# Patient Record
Sex: FEMALE | Race: ASIAN | NOT HISPANIC OR LATINO | Employment: FULL TIME | URBAN - METROPOLITAN AREA
[De-identification: names, ages, dates, MRNs, and addresses within clinical notes are randomized per-mention and may not be internally consistent; named-entity substitution may affect disease eponyms.]

---

## 2018-03-31 ENCOUNTER — HOSPITAL ENCOUNTER (EMERGENCY)
Facility: HOSPITAL | Age: 48
Discharge: HOME | End: 2018-03-31
Payer: COMMERCIAL

## 2018-03-31 VITALS
TEMPERATURE: 98.1 F | HEART RATE: 89 BPM | DIASTOLIC BLOOD PRESSURE: 74 MMHG | RESPIRATION RATE: 20 BRPM | OXYGEN SATURATION: 96 % | SYSTOLIC BLOOD PRESSURE: 115 MMHG | BODY MASS INDEX: 20.49 KG/M2 | HEIGHT: 64 IN | WEIGHT: 120 LBS

## 2018-03-31 DIAGNOSIS — R60.0 FACIAL EDEMA: Primary | ICD-10-CM

## 2018-03-31 PROCEDURE — 96361 HYDRATE IV INFUSION ADD-ON: CPT

## 2018-03-31 PROCEDURE — 99283 EMERGENCY DEPT VISIT LOW MDM: CPT

## 2018-03-31 PROCEDURE — 96375 TX/PRO/DX INJ NEW DRUG ADDON: CPT

## 2018-03-31 PROCEDURE — 96374 THER/PROPH/DIAG INJ IV PUSH: CPT

## 2018-03-31 RX ORDER — DIPHENHYDRAMINE HYDROCHLORIDE 50 MG/ML
25 INJECTION INTRAMUSCULAR; INTRAVENOUS ONCE
Status: COMPLETED | OUTPATIENT
Start: 2018-03-31 | End: 2018-03-31

## 2018-03-31 RX ORDER — METHYLPREDNISOLONE SODIUM SUCCINATE 125 MG/2ML
125 INJECTION, POWDER, LYOPHILIZED, FOR SOLUTION INTRAMUSCULAR; INTRAVENOUS ONCE
Status: COMPLETED | OUTPATIENT
Start: 2018-03-31 | End: 2018-03-31

## 2018-03-31 RX ORDER — GLIMEPIRIDE 2 MG/1
2 TABLET ORAL
COMMUNITY

## 2018-03-31 RX ORDER — ATORVASTATIN CALCIUM 20 MG/1
20 TABLET, FILM COATED ORAL DAILY
COMMUNITY

## 2018-03-31 RX ORDER — TAMOXIFEN CITRATE 20 MG/1
20 TABLET ORAL 2 TIMES DAILY
COMMUNITY

## 2018-03-31 RX ORDER — PREDNISONE 10 MG/1
TABLET ORAL
Qty: 40 TABLET | Refills: 0 | Status: SHIPPED | OUTPATIENT
Start: 2018-03-31 | End: 2020-04-06 | Stop reason: HOSPADM

## 2018-03-31 RX ORDER — IRBESARTAN 75 MG/1
75 TABLET ORAL
COMMUNITY

## 2018-03-31 RX ADMIN — METHYLPREDNISOLONE SODIUM SUCCINATE 125 MG: 125 INJECTION, POWDER, FOR SOLUTION INTRAMUSCULAR; INTRAVENOUS at 07:44

## 2018-03-31 RX ADMIN — DIPHENHYDRAMINE HYDROCHLORIDE 25 MG: 50 INJECTION, SOLUTION INTRAMUSCULAR; INTRAVENOUS at 07:42

## 2018-03-31 RX ADMIN — FAMOTIDINE 20 MG: 10 INJECTION, SOLUTION INTRAVENOUS at 07:46

## 2018-03-31 RX ADMIN — SODIUM CHLORIDE 1000 ML: 0.9 INJECTION, SOLUTION INTRAVENOUS at 07:46

## 2018-03-31 NOTE — ED NOTES
Patient is eating cookies at this time  No evidence of distress noted  Patients face appears to be less swollen  Will continue to monitor        Dianna Jauregui RN  03/31/18 4378

## 2018-03-31 NOTE — DISCHARGE INSTRUCTIONS
Stop prescription losartan until you see your family doctor  General Allergic Reaction, Ambulatory Care   GENERAL INFORMATION:   A general allergic reaction  is your body's response to an allergen  Allergens include medicines, food, insect stings, animal dander, mold, latex, chemicals, and dust mites  Pollen from trees, grass, and weeds can also cause an allergic reaction  An allergic reaction can cause one or more symptoms  Seek immediate care for the following symptoms:   · A skin rash, hives, swelling, or itching that gets worse    · Trouble breathing, shortness of breath, wheezing, or coughing    · Tightened or swollen throat, or your lips or tongue swell    · Trouble swallowing or speaking    · Dizziness, lightheadedness, fainting, or confusion    · Nausea, vomiting, diarrhea, or abdominal cramps    · Chest pain or tightness  Treatment for a general allergic reaction  may include medicines to relieve certain allergy symptoms such as itching, sneezing, and swelling  You may take them as a pill or use drops in your nose or eyes  Topical treatments may be given to put directly on your skin to help decrease itching or swelling  Manage allergic reactions:   · Avoid the allergen  that you think may have caused your allergic reaction  · Use cold compresses  on your skin or eyes if they were affected by the allergic reaction  Cold compresses may help to soothe your skin or eyes  · Rinse your nasal passages  with a saline solution  Daily rinsing may help clear your nose of allergens  · Do not smoke  Your allergy symptoms may decrease if you are not around smoke  If you smoke, it is never too late to quit  Ask your healthcare provider for information about how to stop if you need help quitting  Follow up with your healthcare provider as directed:  Write down your questions so you remember to ask them during your visits  CARE AGREEMENT:   You have the right to help plan your care   Learn about your health condition and how it may be treated  Discuss treatment options with your caregivers to decide what care you want to receive  You always have the right to refuse treatment  The above information is an  only  It is not intended as medical advice for individual conditions or treatments  Talk to your doctor, nurse or pharmacist before following any medical regimen to see if it is safe and effective for you  © 2014 [address] is for End User's use only and may not be sold, redistributed or otherwise used for commercial purposes  All illustrations and images included in CareNotes® are the copyrighted property of [employer] , [employer]  or [employer]  Angioedema   WHAT YOU NEED TO KNOW:   Angioedema is sudden swelling caused by fluid that collects in deep layers of the skin  Swelling occurs most often on the face, lips, tongue, or throat, but it can happen anywhere on the body  Your risk for angioedema increases if you have food or insect allergies or a family history of angioedema  Emotional stress, autoimmune disorders, and medicines, such as ACE inhibitors, also increase your risk  Your symptoms may be mild, or you may develop anaphylaxis  Anaphylaxis is a sudden, life-threatening reaction that needs immediate treatment  DISCHARGE INSTRUCTIONS:   Call 911 for signs or symptoms of anaphylaxis,  such as trouble breathing, swelling in your mouth or throat, or wheezing  You may also have itching, a rash, hives, or feel like you are going to faint  Return to the emergency department if:   · You have sudden behavior changes or irritability  · You are dizzy and your heart is beating faster than usual   Contact your healthcare provider if:   · Your swelling does not improve, even after you take your medicines  · You have questions or concerns about your condition or care  Medicines:   · Antihistamines  decrease mild symptoms such as itching or a rash  · Epinephrine  is used to treat severe allergic reactions such as anaphylaxis  · Steroids: This medicine may be given to decrease redness, pain, and swelling  · Take your medicine as directed  Contact your healthcare provider if you think your medicine is not helping or if you have side effects  Tell him of her if you are allergic to any medicine  Keep a list of the medicines, vitamins, and herbs you take  Include the amounts, and when and why you take them  Bring the list or the pill bottles to follow-up visits  Carry your medicine list with you in case of an emergency  Steps to take for signs or symptoms of anaphylaxis:   · Immediately  give 1 shot of epinephrine only into the outer thigh muscle  · Leave the shot in place  as directed  Your healthcare provider may recommend you leave it in place for up to 10 seconds before you remove it  This helps make sure all of the epinephrine is delivered  · Call 911 and go to the emergency department,  even if the shot improved symptoms  Do not drive yourself  Bring the used epinephrine shot with you  Safety precautions to take if you are at risk for anaphylaxis:   · Keep 2 shots of epinephrine with you at all times  You may need a second shot, because epinephrine only works for about 20 minutes and symptoms may return  Your healthcare provider can show you and family members how to give the shot  Check the expiration date every month and replace it before it expires  · Create an action plan  Your healthcare provider can help you create a written plan that explains the allergy and an emergency plan to treat a reaction  The plan explains when to give a second epinephrine shot if symptoms return or do not improve after the first  Give copies of the action plan and emergency instructions to family members, work and school staff, and  providers  Show them how to give a shot of epinephrine  · Be careful when you exercise    If you have had exercise-induced anaphylaxis, do not exercise right after you eat  Stop exercising right away if you start to develop any signs or symptoms of anaphylaxis  You may first feel tired, warm, or have itchy skin  Hives, swelling, and severe breathing problems may develop if you continue to exercise  · Carry medical alert identification  Wear medical alert jewelry or carry a card that explains the allergy  Ask your healthcare provider where to get these items  · Keep a symptom diary  Include information about how often your symptoms occur, how long they last, and if they are mild or severe  Also keep information on what you ate, what happened, or which medicines you took before the swelling started  · Avoid triggers  Triggers include foods, medicines, and other things that you know cause symptoms  You may need to see a specialist, such as an allergist or dietitian, to learn what to avoid  Follow up with your healthcare provider as directed:  Write down your questions so you remember to ask them during your visits  © 2017 [address] Information is for End User's use only and may not be sold, redistributed or otherwise used for commercial purposes  All illustrations and images included in CareNotes® are the copyrighted property of [employer]  or [employer]  The above information is an  only  It is not intended as medical advice for individual conditions or treatments  Talk to your doctor, nurse or pharmacist before following any medical regimen to see if it is safe and effective for you

## 2018-03-31 NOTE — ED NOTES
IV access attempted at this time  Attempt unsuccessful in the right AC  Unable to use left arm due to  Increased risk of lymphedema as per patient        Natasha Cockayne, RN  03/31/18 6481

## 2018-03-31 NOTE — ED PROVIDER NOTES
History  Chief Complaint   Patient presents with    Allergic Reaction     Pt presents to ED with lip and facial swelling starting yesterday  Pt states she took benadryl without relief and woke up with significantly worse swelling  Now also c/o some tightness in throat  Denies SOB      Patient is a 50-year-old female who reports itchy face with facial and upper lip swelling yesterday shortly after eating lunch of salmon w asparagus  She took 2 Benadryl and went to bed, but symptoms seem worse upon awakening today w upper lip and facial swelling increased, now has a sensation in her throat as well as an itchy chest   She denies fever/chills, itchy throat, swollen tongue, chest tightness, chest pain, shortness of breath, wheezing, abdominal pain, nausea, vomiting  She did not take any of her maintenance medications as of yet today  Pt denies recent changes or new products including lotions, detergents, makeup  Further denies new meds including otc, rx or supplements, foods  Prior to Admission Medications   Prescriptions Last Dose Informant Patient Reported? Taking?    Dapagliflozin Propanediol (FARXIGA) 10 MG TABS   Yes Yes   Sig: Take by mouth daily   atorvastatin (LIPITOR) 20 mg tablet   Yes Yes   Sig: Take 20 mg by mouth daily   glimepiride (AMARYL) 2 mg tablet   Yes Yes   Sig: Take 2 mg by mouth every morning before breakfast   irbesartan (AVAPRO) 75 mg tablet   Yes Yes   Sig: Take 75 mg by mouth daily at bedtime   metFORMIN (GLUCOPHAGE) 1000 MG tablet   Yes Yes   Sig: Take 1,000 mg by mouth 2 (two) times a day with meals   tamoxifen (NOLVADEX) 20 mg tablet   Yes Yes   Sig: Take 20 mg by mouth 2 (two) times a day      Facility-Administered Medications: None       Past Medical History:   Diagnosis Date    [facility] )     breast    [facility]     Hypertension        Past Surgical History:   Procedure Laterality Date    DILATION AND CURETTAGE OF UTERUS      LYMPHADENECTOMY History reviewed  No pertinent family history  I have reviewed and agree with the history as documented  Social History   Substance Use Topics    Smoking status: Never Smoker    Smokeless tobacco: Never Used    Alcohol use Yes      Comment: socially        Review of Systems   Constitutional: Negative for chills and fever  HENT: Positive for facial swelling  Negative for congestion, rhinorrhea, sinus pain and sore throat  Respiratory: Negative for cough, shortness of breath and wheezing  Cardiovascular: Negative for chest pain  Gastrointestinal: Negative for abdominal pain, diarrhea, nausea and vomiting  Musculoskeletal: Negative for arthralgias and myalgias  Skin: Positive for rash (itchy chest)  Neurological: Negative for dizziness and headaches  All other systems reviewed and are negative  Physical Exam  ED Triage Vitals [03/31/18 0649]   Temperature Pulse Respirations Blood Pressure SpO2   98 1 °F (36 7 °C) 92 17 136/81 97 %      Temp Source Heart Rate Source Patient Position - Orthostatic VS BP Location FiO2 (%)   Oral Monitor Sitting Right arm --      Pain Score       No Pain           Orthostatic Vital Signs  Vitals:    03/31/18 0748 03/31/18 0900 03/31/18 0956 03/31/18 1104   BP: 130/86 129/76 116/72 115/74   Pulse: 87 84 87 89   Patient Position - Orthostatic VS: Lying Lying         Physical Exam   Constitutional: She is oriented to person, place, and time  She appears well-developed and well-nourished  No distress  HENT:   Head: Normocephalic and atraumatic  Right Ear: Hearing, tympanic membrane, external ear and ear canal normal    Left Ear: Hearing, tympanic membrane, external ear and ear canal normal    Nose: Nose normal  No mucosal edema or rhinorrhea  Right sinus exhibits no maxillary sinus tenderness  Left sinus exhibits no maxillary sinus tenderness     Mouth/Throat: Uvula is midline, oropharynx is clear and moist and mucous membranes are normal  No trismus in the jaw  No uvula swelling  No oropharyngeal exudate, posterior oropharyngeal edema or posterior oropharyngeal erythema  Tongue w no edema, pos pharynx and airway patent   Eyes: Conjunctivae, EOM and lids are normal  Pupils are equal, round, and reactive to light  Neck: Normal range of motion  Neck supple  Cardiovascular: Normal rate, regular rhythm and normal heart sounds  Pulmonary/Chest: Effort normal and breath sounds normal    Abdominal: Soft  Normal appearance and bowel sounds are normal  There is no tenderness  Musculoskeletal:   Normal gait and station  Non-focal with FROM upper, lower extremities, neck, chest and back   Lymphadenopathy:     She has no cervical adenopathy  Neurological: She is alert and oriented to person, place, and time  Appropriate speech and behavior  Non-focal  No sensory deficits noted, no motor deficits noted   Skin: Skin is warm and dry  Capillary refill takes less than 2 seconds  ED Medications  Medications   diphenhydrAMINE (BENADRYL) injection 25 mg (25 mg Intravenous Given 3/31/18 0742)   methylPREDNISolone sodium succinate (Solu-MEDROL) injection 125 mg (125 mg Intravenous Given 3/31/18 0744)   sodium chloride 0 9 % bolus 1,000 mL (0 mL Intravenous Stopped 3/31/18 1143)   diphenhydrAMINE (BENADRYL) injection 25 mg (25 mg Intravenous Given 3/31/18 0742)   famotidine (PEPCID) injection 20 mg (20 mg Intravenous Given 3/31/18 0746)       Diagnostic Studies  Results Reviewed     None                 No orders to display              Procedures  Procedures       Phone Contacts  ED Phone Contact    ED Course  ED Course                                MDM  Number of Diagnoses or Management Options  Facial edema:   Diagnosis management comments: 78-year-old female presents with itchy face and chest, upper lip and bilateral cheek swelling starting yesterday shortly after eating lunch  Patient has a history of diabetes, hypertension and breast cancer    She does take losartan for her blood pressure  Differential includes allergic reaction versus angioedema from ARB  Patient's vital signs are stable, airway is patent & lungs are clear  Case and treatment plan discussed with attending  Plan is to administer fluids, Benadryl, Solu-Medrol and Pepcid IV then reassess  7786 upon reassessment patient states that the itching has resolved, facial and lip swelling feels the same  She still feels a slight sensation in her throat, however it has improved from earlier  1030 upon reassessment patient's symptoms have continued to improve  She reports feeling less swollen in her cheeks and chin, and denies throat sensation  She has continued to remain both clinically and hemodynamically stable while in the ED  She is stable for discharge  We discussed symptoms secondary to allergic reaction versus angioedema from prescription medication most likely Arb, but any medication can cause allergic reaction  Plan is to prescribe prednisone taper for 2 weeks and have patient stop prescription losartan until she follows up with her primary care physician  She can continue Benadryl as directed as needed  Both verbal and written discharge instructions provided  Adequate time was allowed to answer any questions  Recommend follow up with family doctor or referral physician as discussed, sooner if symptoms persist, change or worsen  Red flag symptoms as well as reasons to return to the ED discussed  Pt verbally understood treatment plan and was discharged home in stable condition        CritCare Time    Disposition  Final diagnoses:   Facial edema - allergic reaction vs angioedema     Time reflects when diagnosis was documented in both MDM as applicable and the Disposition within this note     Time User Action Codes Description Comment    3/31/2018 11:26 AM Hetal IRVING Add [R60 0] Facial edema     3/31/2018 11:26 AM Hetal IRVING Modify [R60 0] Facial edema allergic reaction vs angioedema      ED Disposition     ED Disposition Condition Comment    Discharge  [address] discharge to home/self care  Condition at discharge: Good        Follow-up Information     Follow up With Specialties Details Why Michelle Smith MD  Schedule an appointment as soon as possible for a visit Recommend follow up with PCP ASAP due to allergic reaction vs angioedema reaction to prescription medication [address] [place] [address]  [phone]          Patient's Medications   Discharge Prescriptions    PREDNISONE 10 MG TABLET    4 tabs days 1-4, 3 tabs days 5-8, 2 tabs days 9-12, 1 tab days 13-16  Take full dose in am with breakfast        Start Date: 3/31/2018 End Date: --       Order Dose: --       Quantity: 40 tablet    Refills: 0     No discharge procedures on file      ED Provider  Electronically Signed by           Bakari Vance PA-C  03/31/18 1149

## 2019-12-13 ENCOUNTER — HOSPITAL ENCOUNTER (EMERGENCY)
Facility: HOSPITAL | Age: 49
Discharge: HOME | End: 2019-12-13
Attending: EMERGENCY MEDICINE
Payer: COMMERCIAL

## 2019-12-13 ENCOUNTER — APPOINTMENT (EMERGENCY)
Dept: CT IMAGING | Facility: HOSPITAL | Age: 49
End: 2019-12-13
Payer: COMMERCIAL

## 2019-12-13 VITALS
WEIGHT: 115 LBS | HEART RATE: 97 BPM | DIASTOLIC BLOOD PRESSURE: 71 MMHG | TEMPERATURE: 97.6 F | SYSTOLIC BLOOD PRESSURE: 120 MMHG | OXYGEN SATURATION: 97 % | RESPIRATION RATE: 16 BRPM | BODY MASS INDEX: 20.05 KG/M2

## 2019-12-13 DIAGNOSIS — M54.50 ACUTE MIDLINE LOW BACK PAIN WITHOUT SCIATICA: Primary | ICD-10-CM

## 2019-12-13 LAB
ALBUMIN SERPL BCP-MCNC: 3.9 G/DL (ref 3.5–5)
ALP SERPL-CCNC: 58 U/L (ref 46–116)
ALT SERPL W P-5'-P-CCNC: 23 U/L (ref 12–78)
ANION GAP SERPL CALCULATED.3IONS-SCNC: 10 MMOL/L (ref 4–13)
APTT PPP: 27 SECONDS (ref 23–37)
AST SERPL W P-5'-P-CCNC: 11 U/L (ref 5–45)
BACTERIA UR QL AUTO: ABNORMAL /HPF
BASOPHILS # BLD AUTO: 0.05 THOUSANDS/ΜL (ref 0–0.1)
BASOPHILS NFR BLD AUTO: 1 % (ref 0–1)
BILIRUB DIRECT SERPL-MCNC: 0.12 MG/DL (ref 0–0.2)
BILIRUB SERPL-MCNC: 0.5 MG/DL (ref 0.2–1)
BILIRUB UR QL STRIP: NEGATIVE
BUN SERPL-MCNC: 9 MG/DL (ref 5–25)
CALCIUM SERPL-MCNC: 9.4 MG/DL (ref 8.3–10.1)
CHLORIDE SERPL-SCNC: 103 MMOL/L (ref 100–108)
CLARITY UR: ABNORMAL
CO2 SERPL-SCNC: 27 MMOL/L (ref 21–32)
COLOR UR: YELLOW
CREAT SERPL-MCNC: 0.51 MG/DL (ref 0.6–1.3)
EOSINOPHIL # BLD AUTO: 0.06 THOUSAND/ΜL (ref 0–0.61)
EOSINOPHIL NFR BLD AUTO: 1 % (ref 0–6)
ERYTHROCYTE [DISTWIDTH] IN BLOOD BY AUTOMATED COUNT: 13.2 % (ref 11.6–15.1)
EXT PREG TEST URINE: NEGATIVE
EXT. CONTROL ED NAV: NORMAL
GFR SERPL CREATININE-BSD FRML MDRD: 114 ML/MIN/1.73SQ M
GLUCOSE SERPL-MCNC: 139 MG/DL (ref 65–140)
GLUCOSE UR STRIP-MCNC: NEGATIVE MG/DL
HCT VFR BLD AUTO: 37.5 % (ref 34.8–46.1)
HGB BLD-MCNC: 11.5 G/DL (ref 11.5–15.4)
HGB UR QL STRIP.AUTO: NEGATIVE
IMM GRANULOCYTES # BLD AUTO: 0.01 THOUSAND/UL (ref 0–0.2)
IMM GRANULOCYTES NFR BLD AUTO: 0 % (ref 0–2)
INR PPP: 0.93 (ref 0.84–1.19)
KETONES UR STRIP-MCNC: NEGATIVE MG/DL
LEUKOCYTE ESTERASE UR QL STRIP: NEGATIVE
LYMPHOCYTES # BLD AUTO: 1.6 THOUSANDS/ΜL (ref 0.6–4.47)
LYMPHOCYTES NFR BLD AUTO: 30 % (ref 14–44)
MCH RBC QN AUTO: 24.9 PG (ref 26.8–34.3)
MCHC RBC AUTO-ENTMCNC: 30.7 G/DL (ref 31.4–37.4)
MCV RBC AUTO: 81 FL (ref 82–98)
MONOCYTES # BLD AUTO: 0.47 THOUSAND/ΜL (ref 0.17–1.22)
MONOCYTES NFR BLD AUTO: 9 % (ref 4–12)
NEUTROPHILS # BLD AUTO: 3.08 THOUSANDS/ΜL (ref 1.85–7.62)
NEUTS SEG NFR BLD AUTO: 59 % (ref 43–75)
NITRITE UR QL STRIP: POSITIVE
NON-SQ EPI CELLS URNS QL MICRO: ABNORMAL /HPF
NRBC BLD AUTO-RTO: 0 /100 WBCS
PH UR STRIP.AUTO: 5.5 [PH]
PLATELET # BLD AUTO: 401 THOUSANDS/UL (ref 149–390)
PMV BLD AUTO: 9.4 FL (ref 8.9–12.7)
POTASSIUM SERPL-SCNC: 4.4 MMOL/L (ref 3.5–5.3)
PROT SERPL-MCNC: 7.5 G/DL (ref 6.4–8.2)
PROT UR STRIP-MCNC: NEGATIVE MG/DL
PROTHROMBIN TIME: 12.5 SECONDS (ref 11.6–14.5)
RBC # BLD AUTO: 4.61 MILLION/UL (ref 3.81–5.12)
RBC #/AREA URNS AUTO: ABNORMAL /HPF
SODIUM SERPL-SCNC: 140 MMOL/L (ref 136–145)
SP GR UR STRIP.AUTO: 1.02 (ref 1–1.03)
UROBILINOGEN UR QL STRIP.AUTO: 0.2 E.U./DL
WBC # BLD AUTO: 5.27 THOUSAND/UL (ref 4.31–10.16)
WBC #/AREA URNS AUTO: ABNORMAL /HPF

## 2019-12-13 PROCEDURE — 81001 URINALYSIS AUTO W/SCOPE: CPT | Performed by: EMERGENCY MEDICINE

## 2019-12-13 PROCEDURE — 80076 HEPATIC FUNCTION PANEL: CPT | Performed by: EMERGENCY MEDICINE

## 2019-12-13 PROCEDURE — 85730 THROMBOPLASTIN TIME PARTIAL: CPT | Performed by: EMERGENCY MEDICINE

## 2019-12-13 PROCEDURE — 85610 PROTHROMBIN TIME: CPT | Performed by: EMERGENCY MEDICINE

## 2019-12-13 PROCEDURE — 81025 URINE PREGNANCY TEST: CPT | Performed by: EMERGENCY MEDICINE

## 2019-12-13 PROCEDURE — 80048 BASIC METABOLIC PNL TOTAL CA: CPT | Performed by: EMERGENCY MEDICINE

## 2019-12-13 PROCEDURE — 72131 CT LUMBAR SPINE W/O DYE: CPT

## 2019-12-13 PROCEDURE — 99285 EMERGENCY DEPT VISIT HI MDM: CPT | Performed by: EMERGENCY MEDICINE

## 2019-12-13 PROCEDURE — 99284 EMERGENCY DEPT VISIT MOD MDM: CPT

## 2019-12-13 PROCEDURE — 36415 COLL VENOUS BLD VENIPUNCTURE: CPT | Performed by: EMERGENCY MEDICINE

## 2019-12-13 PROCEDURE — 96374 THER/PROPH/DIAG INJ IV PUSH: CPT

## 2019-12-13 PROCEDURE — 85025 COMPLETE CBC W/AUTO DIFF WBC: CPT | Performed by: EMERGENCY MEDICINE

## 2019-12-13 RX ORDER — PREDNISONE 20 MG/1
40 TABLET ORAL DAILY
Qty: 10 TABLET | Refills: 0 | Status: SHIPPED | OUTPATIENT
Start: 2019-12-13 | End: 2020-04-06 | Stop reason: HOSPADM

## 2019-12-13 RX ORDER — MORPHINE SULFATE 10 MG/ML
6 INJECTION, SOLUTION INTRAMUSCULAR; INTRAVENOUS ONCE
Status: COMPLETED | OUTPATIENT
Start: 2019-12-13 | End: 2019-12-13

## 2019-12-13 RX ORDER — OXYCODONE HYDROCHLORIDE AND ACETAMINOPHEN 5; 325 MG/1; MG/1
1 TABLET ORAL EVERY 4 HOURS PRN
Qty: 10 TABLET | Refills: 0 | Status: SHIPPED | OUTPATIENT
Start: 2019-12-13

## 2019-12-13 RX ADMIN — MORPHINE SULFATE 6 MG: 10 INJECTION INTRAVENOUS at 12:47

## 2019-12-13 NOTE — ED PROVIDER NOTES
History  Chief Complaint   Patient presents with    Back Pain     pt co of mid lower abd pain onset this am after bending over for shoes      Sudden onset moderate to severe aching mid back pain, radiates around to abdomen bilaterally, started just prior to arrival while bending over to tie her shoes  Worse with bending and moving, alleviated w rest and sitting still  No h/o similar sxs  No direct trauma  No constitutional sxs  No leg weakness or numbness  No syncope or presyncope  No h/o AAA or known aneurysm  No chest or upper back pain  No incontinence  Prior to Admission Medications   Prescriptions Last Dose Informant Patient Reported? Taking? Dapagliflozin Propanediol (FARXIGA) 10 MG TABS   Yes No   Sig: Take by mouth daily   atorvastatin (LIPITOR) 20 mg tablet   Yes No   Sig: Take 20 mg by mouth daily   glimepiride (AMARYL) 2 mg tablet   Yes No   Sig: Take 2 mg by mouth every morning before breakfast   irbesartan (AVAPRO) 75 mg tablet   Yes No   Sig: Take 75 mg by mouth daily at bedtime   metFORMIN (GLUCOPHAGE) 1000 MG tablet   Yes No   Sig: Take 1,000 mg by mouth 2 (two) times a day with meals   predniSONE 10 mg tablet   No No   Si tabs days 1-4, 3 tabs days 5-8, 2 tabs days 9-12, 1 tab days 13-16  Take full dose in am with breakfast    tamoxifen (NOLVADEX) 20 mg tablet   Yes No   Sig: Take 20 mg by mouth 2 (two) times a day      Facility-Administered Medications: None       Past Medical History:   Diagnosis Date    Cancer ([facility] )     breast    [facility]     Hypertension        Past Surgical History:   Procedure Laterality Date    DILATION AND CURETTAGE OF UTERUS      LYMPHADENECTOMY         History reviewed  No pertinent family history  I have reviewed and agree with the history as documented      Social History     Tobacco Use    Smoking status: Never Smoker    Smokeless tobacco: Never Used   Substance Use Topics    Alcohol use: Yes     Comment: socially    Drug use: No        Review of Systems   Gastrointestinal: Positive for abdominal pain  Musculoskeletal: Positive for back pain  All other systems reviewed and are negative  Physical Exam  Physical Exam   Constitutional: She is oriented to person, place, and time  She appears well-developed and well-nourished  No distress  HENT:   Head: Normocephalic and atraumatic  Eyes: Pupils are equal, round, and reactive to light  Conjunctivae and EOM are normal    Neck: Normal range of motion  Neck supple  No JVD present  Cardiovascular: Normal rate, regular rhythm, normal heart sounds and intact distal pulses  Pulmonary/Chest: Effort normal and breath sounds normal  No stridor  Abdominal: Soft  She exhibits no distension  There is no tenderness  There is no rebound and no guarding  Musculoskeletal: Normal range of motion  She exhibits no edema, tenderness or deformity  Neurological: She is alert and oriented to person, place, and time  No cranial nerve deficit or sensory deficit  She exhibits normal muscle tone  Coordination normal    Skin: Skin is warm and dry  Capillary refill takes less than 2 seconds  No rash noted  She is not diaphoretic  No erythema  No pallor  Nursing note and vitals reviewed        Vital Signs  ED Triage Vitals [12/13/19 0959]   Temperature Pulse Respirations Blood Pressure SpO2   97 6 °F (36 4 °C) 102 16 117/69 99 %      Temp Source Heart Rate Source Patient Position - Orthostatic VS BP Location FiO2 (%)   Oral Monitor Sitting Right arm --      Pain Score       Worst Possible Pain           Vitals:    12/13/19 0959 12/13/19 1210 12/13/19 1300 12/13/19 1330   BP: 117/69 124/75 119/70 120/71   Pulse: 102 95 97 97   Patient Position - Orthostatic VS: Sitting Sitting Sitting          Visual Acuity      ED Medications  Medications   morphine (PF) 10 mg/mL injection 6 mg (6 mg Intravenous Given 12/13/19 1247)       Diagnostic Studies  Results Reviewed     Procedure Component Value Units Date/Time    Urine Microscopic [[id number]]  (Abnormal) Collected:  12/13/19 1157    Lab Status:  Final result Specimen:  Urine, Clean Catch Updated:  12/13/19 1226     RBC, UA None Seen /hpf      WBC, UA 2-4 /hpf      Epithelial Cells Occasional /hpf      Bacteria, UA Innumerable /hpf     UA (URINE) with reflex to Scope [[id number]]  (Abnormal) Collected:  12/13/19 1157    Lab Status:  Final result Specimen:  Urine, Clean Catch Updated:  12/13/19 1209     Color, UA Yellow     Clarity, UA Slightly Cloudy     Specific Jay, UA 1 020     pH, UA 5 5     Leukocytes, UA Negative     Nitrite, UA Positive     Protein, UA Negative mg/dl      Glucose, UA Negative mg/dl      Ketones, UA Negative mg/dl      Urobilinogen, UA 0 2 E U /dl      Bilirubin, UA Negative     Blood, UA Negative    POCT pregnancy, urine [[id number]]  (Normal) Resulted:  12/13/19 1202    Lab Status:  Final result Updated:  12/13/19 1203     EXT PREG TEST UR (Ref: Negative) negative     Control valid    Hepatic function panel [[id number]]  (Normal) Collected:  12/13/19 1116    Lab Status:  Final result Specimen:  Blood from Arm, Right Updated:  12/13/19 1146     Total Bilirubin 0 50 mg/dL      Bilirubin, Direct 0 12 mg/dL      Alkaline Phosphatase 58 U/L      AST 11 U/L      ALT 23 U/L      Total Protein 7 5 g/dL      Albumin 3 9 g/dL     Basic metabolic panel [[id number]]  (Abnormal) Collected:  12/13/19 1116    Lab Status:  Final result Specimen:  Blood from Arm, Right Updated:  12/13/19 1146     Sodium 140 mmol/L      Potassium 4 4 mmol/L      Chloride 103 mmol/L      CO2 27 mmol/L      ANION GAP 10 mmol/L      BUN 9 mg/dL      Creatinine 0 51 mg/dL      Glucose 139 mg/dL      Calcium 9 4 mg/dL      eGFR 114 ml/min/1 73sq m     Narrative:       [place] guidelines for Chronic Kidney Disease (CKD):     Stage 1 with normal or high GFR (GFR > 90 mL/min/1 73 square meters)    Stage 2 Mild CKD (GFR = 60-89 mL/min/1 73 square meters)   Stage 3A Moderate CKD (GFR = 45-59 mL/min/1 73 square meters)    Stage 3B Moderate CKD (GFR = 30-44 mL/min/1 73 square meters)    Stage 4 Severe CKD (GFR = 15-29 mL/min/1 73 square meters)    Stage 5 End Stage CKD (GFR <15 mL/min/1 73 square meters)  Note: GFR calculation is accurate only with a steady state creatinine    Protime-INR [[id number]]  (Normal) Collected:  12/13/19 1116    Lab Status:  Final result Specimen:  Blood from Arm, Right Updated:  12/13/19 1141     Protime 12 5 seconds      INR 0 93    APTT [[id number]]  (Normal) Collected:  12/13/19 1116    Lab Status:  Final result Specimen:  Blood from Arm, Right Updated:  12/13/19 1141     PTT 27 seconds     CBC and differential [[id number]]  (Abnormal) Collected:  12/13/19 1116    Lab Status:  Final result Specimen:  Blood from Arm, Right Updated:  12/13/19 1137     WBC 5 27 Thousand/uL      RBC 4 61 Million/uL      Hemoglobin 11 5 g/dL      Hematocrit 37 5 %      MCV 81 fL      MCH 24 9 pg      MCHC 30 7 g/dL      RDW 13 2 %      MPV 9 4 fL      Platelets 830 Thousands/uL      nRBC 0 /100 WBCs      Neutrophils Relative 59 %      Immat GRANS % 0 %      Lymphocytes Relative 30 %      Monocytes Relative 9 %      Eosinophils Relative 1 %      Basophils Relative 1 %      Neutrophils Absolute 3 08 Thousands/µL      Immature Grans Absolute 0 01 Thousand/uL      Lymphocytes Absolute 1 60 Thousands/µL      Monocytes Absolute 0 47 Thousand/µL      Eosinophils Absolute 0 06 Thousand/µL      Basophils Absolute 0 05 Thousands/µL                  CT spine lumbar without contrast   Final Result by Chato Barrett MD (12/13 1310)      No discrete lytic or blastic osseous lesions identified  Transitional type anatomy at the lumbosacral junction, as described above for which complete spine radiograph are recommended prior to any consideration for intervention  Mild degenerative changes as described above                 Workstation performed: [id number] Procedures  Procedures         ED Course                               MDM      Disposition  Final diagnoses:   Acute midline low back pain without sciatica     Time reflects when diagnosis was documented in both MDM as applicable and the Disposition within this note     Time User Action Codes Description Comment    12/13/2019  1:40 PM Farnaz Stacy Add [M54 5] Acute midline low back pain without sciatica       ED Disposition     ED Disposition Condition Date/Time Comment    Discharge Stable Fri Dec 13, 2019  1:40 PM [address] [place] [address] discharge to home/self care  Follow-up Information     Follow up With Specialties Details Why Contact Info Additional Information    [facility] Physical Therapy In 3 days  [phone]          Discharge Medication List as of 12/13/2019  1:41 PM      START taking these medications    Details   oxyCODONE-acetaminophen (PERCOCET) 5-325 mg per tablet Take 1 tablet by mouth every 4 (four) hours as needed for moderate pain for up to 10 dosesMax Daily Amount: 6 tablets, Starting Fri 12/13/2019, Print      !! predniSONE 20 mg tablet Take 2 tablets (40 mg total) by mouth daily, Starting Fri 12/13/2019, Print       !! - Potential duplicate medications found  Please discuss with provider  CONTINUE these medications which have NOT CHANGED    Details   atorvastatin (LIPITOR) 20 mg tablet Take 20 mg by mouth daily, Historical Med      Dapagliflozin Propanediol (FARXIGA) 10 MG TABS Take by mouth daily, Historical Med      glimepiride (AMARYL) 2 mg tablet Take 2 mg by mouth every morning before breakfast, Historical Med      irbesartan (AVAPRO) 75 mg tablet Take 75 mg by mouth daily at bedtime, Historical Med      metFORMIN (GLUCOPHAGE) 1000 MG tablet Take 1,000 mg by mouth 2 (two) times a day with meals, Historical Med      !! predniSONE 10 mg tablet 4 tabs days 1-4, 3 tabs days 5-8, 2 tabs days 9-12, 1 tab days 13-16   Take full dose in am with breakfast , Print      tamoxifen (NOLVADEX) 20 mg tablet Take 20 mg by mouth 2 (two) times a day, Historical Med       !! - Potential duplicate medications found  Please discuss with provider  No discharge procedures on file      ED Provider  Electronically Signed by           Kim Gibbs MD  12/14/19 2869

## 2019-12-18 ENCOUNTER — NURSE TRIAGE (OUTPATIENT)
Dept: PHYSICAL THERAPY | Facility: OTHER | Age: 49
End: 2019-12-18

## 2019-12-18 DIAGNOSIS — M54.50 ACUTE MIDLINE LOW BACK PAIN WITHOUT SCIATICA: Primary | ICD-10-CM

## 2019-12-18 NOTE — TELEPHONE ENCOUNTER
Background - Initial Assessment  Clinical complaint: Acute low back pain, midline, no sciatica  Pt staed thjat she bent over to pick something up and started with this pain  Pt stated that the pain does go around to her stomach area  She stated that she "feels bloated"  Pt is ambulating with a cane, and today was the first time that she went down a flight of stairs  No neck/back surgeries, and is not currently following any specialist for her back  Pt did take the medication that were given to her at [facility]  And is using heat/message for her back  Pt also stated that she cannot stand more than 20 minutes or the pain becomes too horrible  Date of onset: 12/13/2019  Frequency of pain: constant  Quality of pain: throbbing, with certain movements will having a "stabbing"  Protocols used: SL AMB COMPREHENSIVE SPINE PROGRAM PROTOCOL    This nurse did review in detail the comp spine program and what we can provide for the pt for their back pain  Pt is agreeable to being triaged by this nurse and would like to have physical therapy  Referrals were placed to the following:  Physical Therapy at the [facility]     site  Pt was given the ph # to that office  No further questions voiced at this time and Pt did state understanding of the referral that was placed

## 2020-01-07 ENCOUNTER — TELEPHONE (OUTPATIENT)
Dept: PHYSICAL THERAPY | Facility: CLINIC | Age: 50
End: 2020-01-07

## 2020-01-07 NOTE — TELEPHONE ENCOUNTER
----- Message from Idalmis Verma RN sent at 12/18/2019 10:41 AM EST -----  Regarding: Comp Spine Pt  Alyssa Dawn Acute low back  Pt is expecting a call from your office to set up her appointment  She is Acute low back      Thanks,  Sneha Taylor RN [address] [place] [address]

## 2020-04-01 ENCOUNTER — HOSPITAL ENCOUNTER (INPATIENT)
Facility: HOSPITAL | Age: 50
LOS: 5 days | Discharge: HOME | DRG: 178 | End: 2020-04-06
Attending: EMERGENCY MEDICINE | Admitting: FAMILY MEDICINE
Payer: COMMERCIAL

## 2020-04-01 ENCOUNTER — APPOINTMENT (EMERGENCY)
Dept: RADIOLOGY | Facility: HOSPITAL | Age: 50
DRG: 178 | End: 2020-04-01
Payer: COMMERCIAL

## 2020-04-01 DIAGNOSIS — J12.82 PNEUMONIA DUE TO COVID-19 VIRUS: Primary | ICD-10-CM

## 2020-04-01 DIAGNOSIS — U07.1 COVID-19 VIRUS DETECTED: ICD-10-CM

## 2020-04-01 DIAGNOSIS — U07.1 PNEUMONIA DUE TO COVID-19 VIRUS: Primary | ICD-10-CM

## 2020-04-01 PROBLEM — E11.9 TYPE 2 DIABETES MELLITUS, WITH LONG-TERM CURRENT USE OF INSULIN (HCC): Status: ACTIVE | Noted: 2020-04-01

## 2020-04-01 PROBLEM — J98.8 VIRAL RESPIRATORY INFECTION: Status: ACTIVE | Noted: 2020-04-01

## 2020-04-01 PROBLEM — Z79.4 TYPE 2 DIABETES MELLITUS, WITH LONG-TERM CURRENT USE OF INSULIN (HCC): Status: ACTIVE | Noted: 2020-04-01

## 2020-04-01 PROBLEM — B97.89 VIRAL RESPIRATORY INFECTION: Status: ACTIVE | Noted: 2020-04-01

## 2020-04-01 PROBLEM — R65.10 SIRS (SYSTEMIC INFLAMMATORY RESPONSE SYNDROME) (HCC): Status: ACTIVE | Noted: 2020-04-01

## 2020-04-01 PROBLEM — I10 HYPERTENSION: Status: ACTIVE | Noted: 2020-04-01

## 2020-04-01 PROBLEM — E78.5 HYPERLIPIDEMIA: Status: ACTIVE | Noted: 2020-04-01

## 2020-04-01 LAB
ALBUMIN SERPL BCP-MCNC: 3.3 G/DL (ref 3.5–5)
ALP SERPL-CCNC: 65 U/L (ref 46–116)
ALT SERPL W P-5'-P-CCNC: 25 U/L (ref 12–78)
ANION GAP SERPL CALCULATED.3IONS-SCNC: 10 MMOL/L (ref 4–13)
APTT PPP: 39 SECONDS (ref 23–37)
AST SERPL W P-5'-P-CCNC: 22 U/L (ref 5–45)
ATRIAL RATE: 109 BPM
BASOPHILS # BLD AUTO: 0.01 THOUSANDS/ΜL (ref 0–0.1)
BASOPHILS NFR BLD AUTO: 0 % (ref 0–1)
BILIRUB SERPL-MCNC: 0.4 MG/DL (ref 0.2–1)
BUN SERPL-MCNC: 7 MG/DL (ref 5–25)
CALCIUM SERPL-MCNC: 8.9 MG/DL (ref 8.3–10.1)
CHLORIDE SERPL-SCNC: 99 MMOL/L (ref 100–108)
CO2 SERPL-SCNC: 25 MMOL/L (ref 21–32)
CREAT SERPL-MCNC: 0.54 MG/DL (ref 0.6–1.3)
EOSINOPHIL # BLD AUTO: 0 THOUSAND/ΜL (ref 0–0.61)
EOSINOPHIL NFR BLD AUTO: 0 % (ref 0–6)
ERYTHROCYTE [DISTWIDTH] IN BLOOD BY AUTOMATED COUNT: 16.5 % (ref 11.6–15.1)
FLUAV RNA NPH QL NAA+PROBE: NORMAL
FLUBV RNA NPH QL NAA+PROBE: NORMAL
GFR SERPL CREATININE-BSD FRML MDRD: 111 ML/MIN/1.73SQ M
GLUCOSE SERPL-MCNC: 122 MG/DL (ref 65–140)
GLUCOSE SERPL-MCNC: 127 MG/DL (ref 65–140)
GLUCOSE SERPL-MCNC: 164 MG/DL (ref 65–140)
GLUCOSE SERPL-MCNC: 86 MG/DL (ref 65–140)
HCT VFR BLD AUTO: 36.7 % (ref 34.8–46.1)
HGB BLD-MCNC: 11.2 G/DL (ref 11.5–15.4)
IMM GRANULOCYTES # BLD AUTO: 0.02 THOUSAND/UL (ref 0–0.2)
IMM GRANULOCYTES NFR BLD AUTO: 0 % (ref 0–2)
INR PPP: 0.93 (ref 0.84–1.19)
LACTATE SERPL-SCNC: 1.3 MMOL/L (ref 0.5–2)
LYMPHOCYTES # BLD AUTO: 0.71 THOUSANDS/ΜL (ref 0.6–4.47)
LYMPHOCYTES NFR BLD AUTO: 13 % (ref 14–44)
MCH RBC QN AUTO: 24 PG (ref 26.8–34.3)
MCHC RBC AUTO-ENTMCNC: 30.5 G/DL (ref 31.4–37.4)
MCV RBC AUTO: 79 FL (ref 82–98)
MONOCYTES # BLD AUTO: 0.28 THOUSAND/ΜL (ref 0.17–1.22)
MONOCYTES NFR BLD AUTO: 5 % (ref 4–12)
NEUTROPHILS # BLD AUTO: 4.29 THOUSANDS/ΜL (ref 1.85–7.62)
NEUTS SEG NFR BLD AUTO: 82 % (ref 43–75)
NRBC BLD AUTO-RTO: 0 /100 WBCS
P AXIS: 68 DEGREES
PLATELET # BLD AUTO: 255 THOUSANDS/UL (ref 149–390)
PMV BLD AUTO: 9 FL (ref 8.9–12.7)
POTASSIUM SERPL-SCNC: 3.6 MMOL/L (ref 3.5–5.3)
PR INTERVAL: 158 MS
PROCALCITONIN SERPL-MCNC: <0.05 NG/ML
PROT SERPL-MCNC: 7.3 G/DL (ref 6.4–8.2)
PROTHROMBIN TIME: 12.5 SECONDS (ref 11.6–14.5)
QRS AXIS: 79 DEGREES
QRSD INTERVAL: 94 MS
QT INTERVAL: 324 MS
QTC INTERVAL: 436 MS
RBC # BLD AUTO: 4.66 MILLION/UL (ref 3.81–5.12)
RSV RNA NPH QL NAA+PROBE: NORMAL
S PNEUM AG UR QL: NEGATIVE
SODIUM SERPL-SCNC: 134 MMOL/L (ref 136–145)
T WAVE AXIS: 55 DEGREES
TROPONIN I SERPL-MCNC: <0.02 NG/ML
VENTRICULAR RATE: 109 BPM
WBC # BLD AUTO: 5.31 THOUSAND/UL (ref 4.31–10.16)

## 2020-04-01 PROCEDURE — 99285 EMERGENCY DEPT VISIT HI MDM: CPT

## 2020-04-01 PROCEDURE — 71045 X-RAY EXAM CHEST 1 VIEW: CPT

## 2020-04-01 PROCEDURE — 84484 ASSAY OF TROPONIN QUANT: CPT | Performed by: EMERGENCY MEDICINE

## 2020-04-01 PROCEDURE — 83605 ASSAY OF LACTIC ACID: CPT | Performed by: EMERGENCY MEDICINE

## 2020-04-01 PROCEDURE — 87449 NOS EACH ORGANISM AG IA: CPT | Performed by: INTERNAL MEDICINE

## 2020-04-01 PROCEDURE — 93005 ELECTROCARDIOGRAM TRACING: CPT

## 2020-04-01 PROCEDURE — 93010 ELECTROCARDIOGRAM REPORT: CPT | Performed by: INTERNAL MEDICINE

## 2020-04-01 PROCEDURE — 94664 DEMO&/EVAL PT USE INHALER: CPT

## 2020-04-01 PROCEDURE — 99284 EMERGENCY DEPT VISIT MOD MDM: CPT | Performed by: EMERGENCY MEDICINE

## 2020-04-01 PROCEDURE — 36415 COLL VENOUS BLD VENIPUNCTURE: CPT | Performed by: EMERGENCY MEDICINE

## 2020-04-01 PROCEDURE — 84145 PROCALCITONIN (PCT): CPT | Performed by: EMERGENCY MEDICINE

## 2020-04-01 PROCEDURE — 80053 COMPREHEN METABOLIC PANEL: CPT | Performed by: EMERGENCY MEDICINE

## 2020-04-01 PROCEDURE — 87631 RESP VIRUS 3-5 TARGETS: CPT | Performed by: INTERNAL MEDICINE

## 2020-04-01 PROCEDURE — 99222 1ST HOSP IP/OBS MODERATE 55: CPT | Performed by: INTERNAL MEDICINE

## 2020-04-01 PROCEDURE — 87040 BLOOD CULTURE FOR BACTERIA: CPT | Performed by: EMERGENCY MEDICINE

## 2020-04-01 PROCEDURE — 85610 PROTHROMBIN TIME: CPT | Performed by: EMERGENCY MEDICINE

## 2020-04-01 PROCEDURE — 85730 THROMBOPLASTIN TIME PARTIAL: CPT | Performed by: EMERGENCY MEDICINE

## 2020-04-01 PROCEDURE — 82948 REAGENT STRIP/BLOOD GLUCOSE: CPT

## 2020-04-01 PROCEDURE — 85025 COMPLETE CBC W/AUTO DIFF WBC: CPT | Performed by: EMERGENCY MEDICINE

## 2020-04-01 RX ORDER — AZITHROMYCIN 250 MG/1
500 TABLET, FILM COATED ORAL EVERY 24 HOURS
Status: DISCONTINUED | OUTPATIENT
Start: 2020-04-01 | End: 2020-04-03

## 2020-04-01 RX ORDER — HYDROXYCHLOROQUINE SULFATE 200 MG/1
200 TABLET, FILM COATED ORAL 2 TIMES DAILY
Status: COMPLETED | OUTPATIENT
Start: 2020-04-01 | End: 2020-04-06

## 2020-04-01 RX ORDER — LOSARTAN POTASSIUM 25 MG/1
25 TABLET ORAL DAILY
Status: DISCONTINUED | OUTPATIENT
Start: 2020-04-01 | End: 2020-04-06 | Stop reason: HOSPADM

## 2020-04-01 RX ORDER — ACETAMINOPHEN 325 MG/1
650 TABLET ORAL EVERY 6 HOURS PRN
Status: DISCONTINUED | OUTPATIENT
Start: 2020-04-01 | End: 2020-04-06 | Stop reason: HOSPADM

## 2020-04-01 RX ORDER — ACETAMINOPHEN 325 MG/1
650 TABLET ORAL ONCE
Status: COMPLETED | OUTPATIENT
Start: 2020-04-01 | End: 2020-04-01

## 2020-04-01 RX ORDER — ASCORBIC ACID 500 MG
500 TABLET ORAL 2 TIMES DAILY
Status: DISCONTINUED | OUTPATIENT
Start: 2020-04-01 | End: 2020-04-06 | Stop reason: HOSPADM

## 2020-04-01 RX ORDER — GLIMEPIRIDE 2 MG/1
2 TABLET ORAL
Status: DISCONTINUED | OUTPATIENT
Start: 2020-04-02 | End: 2020-04-06 | Stop reason: HOSPADM

## 2020-04-01 RX ORDER — ATORVASTATIN CALCIUM 20 MG/1
20 TABLET, FILM COATED ORAL DAILY
Status: DISCONTINUED | OUTPATIENT
Start: 2020-04-01 | End: 2020-04-06 | Stop reason: HOSPADM

## 2020-04-01 RX ORDER — GUAIFENESIN/DEXTROMETHORPHAN 100-10MG/5
10 SYRUP ORAL EVERY 4 HOURS PRN
Status: DISCONTINUED | OUTPATIENT
Start: 2020-04-01 | End: 2020-04-06 | Stop reason: HOSPADM

## 2020-04-01 RX ADMIN — LOSARTAN POTASSIUM 25 MG: 25 TABLET, FILM COATED ORAL at 13:06

## 2020-04-01 RX ADMIN — ATORVASTATIN CALCIUM 20 MG: 20 TABLET, FILM COATED ORAL at 13:06

## 2020-04-01 RX ADMIN — HYDROXYCHLOROQUINE SULFATE 200 MG: 200 TABLET, FILM COATED ORAL at 17:31

## 2020-04-01 RX ADMIN — ACETAMINOPHEN 650 MG: 325 TABLET ORAL at 09:03

## 2020-04-01 RX ADMIN — AZITHROMYCIN 500 MG: 250 TABLET, FILM COATED ORAL at 13:06

## 2020-04-01 NOTE — ASSESSMENT & PLAN NOTE
Lab Results   Component Value Date    HGBA1C 8 7 (H) 06/07/2018       Recent Labs     04/01/20  1307   POCGLU 86       Blood Sugar Average: Last 72 hrs:  (P) 86     Continue glimepiride  Hold metformin  Sliding scale

## 2020-04-01 NOTE — ED PROVIDER NOTES
History  Chief Complaint   Patient presents with    Fever - 9 weeks to 74 years     Pt brought in via EMS for eval of cough, fevers, and SOB  Pt works at [email or web address], was exposed to someone who was confirmed +  Was tested for COVID, not yet resulted   Muscle Pain    Shortness of Breath     HPI     23-year-old female with remote history of breast cancer in 2013, type 2 diabetes on metformin and Trulicity, who presents for evaluation of fever, nonproductive cough, shortness of breath, body aches, and headache  Symptoms have been present for the last week  Patient was tested for COVID-19 at [facility] on March 27th, results are not yet back  Patient states that she received a note from her employer 1 week ago stating that someone in her office had tested positive for COIVD-19; she does not know who the person was so does not know any details about her exposure  Over the last couple days, patient reports progressively worsening shortness of breath and generalized weakness  Reports 5-6 episodes of watery diarrhea daily  States that she is drinking fluid and doing her best to stay hydrated  Endorses nausea but no vomiting  Fevers have been as high as 102°  Has been taking Tylenol with some improvement  She lives at home with her  who just over the last couple of days has developed a dry cough  Patient denies chest pain except while coughing  No calf swelling or tenderness  Prior to Admission Medications   Prescriptions Last Dose Informant Patient Reported? Taking?    Dapagliflozin Propanediol (FARXIGA) 10 MG TABS   Yes No   Sig: Take by mouth daily   atorvastatin (LIPITOR) 20 mg tablet   Yes No   Sig: Take 20 mg by mouth daily   glimepiride (AMARYL) 2 mg tablet   Yes No   Sig: Take 2 mg by mouth every morning before breakfast   irbesartan (AVAPRO) 75 mg tablet   Yes No   Sig: Take 75 mg by mouth daily at bedtime   metFORMIN (GLUCOPHAGE) 1000 MG tablet   Yes No   Sig: Take 1,000 mg by mouth 2 (two) times a day with meals   oxyCODONE-acetaminophen (PERCOCET) 5-325 mg per tablet   No No   Sig: Take 1 tablet by mouth every 4 (four) hours as needed for moderate pain for up to 10 dosesMax Daily Amount: 6 tablets   predniSONE 10 mg tablet   No No   Si tabs days 1-4, 3 tabs days 5-8, 2 tabs days 9-12, 1 tab days 13-16  Take full dose in am with breakfast    predniSONE 20 mg tablet   No No   Sig: Take 2 tablets (40 mg total) by mouth daily   tamoxifen (NOLVADEX) 20 mg tablet   Yes No   Sig: Take 20 mg by mouth 2 (two) times a day      Facility-Administered Medications: None       Past Medical History:   Diagnosis Date    Cancer ([facility] )     breast    Diabetes mellitus ([facility] )     Hypertension        Past Surgical History:   Procedure Laterality Date    DILATION AND CURETTAGE OF UTERUS      LYMPHADENECTOMY         History reviewed  No pertinent family history  I have reviewed and agree with the history as documented  E-Cigarette/Vaping     E-Cigarette/Vaping Substances     Social History     Tobacco Use    Smoking status: Never Smoker    Smokeless tobacco: Never Used   Substance Use Topics    Alcohol use: Yes     Comment: socially    Drug use: No       Review of Systems   Constitutional: Positive for appetite change, chills, fatigue and fever  HENT: Negative for congestion and sore throat  Eyes: Negative for visual disturbance  Respiratory: Positive for cough and shortness of breath  Cardiovascular: Positive for chest pain (only while coughing)  Negative for leg swelling  Gastrointestinal: Positive for diarrhea and nausea  Negative for abdominal pain and vomiting  Genitourinary: Negative for dysuria and frequency  Musculoskeletal: Negative for arthralgias, back pain, neck pain and neck stiffness  Skin: Negative for rash  Neurological: Positive for weakness (generalized) and headaches  Negative for numbness     Psychiatric/Behavioral: Negative for agitation, behavioral problems and confusion  Physical Exam  Physical Exam   Constitutional: She is oriented to person, place, and time  She appears well-developed and well-nourished  Appears weak and fatigued   HENT:   Head: Normocephalic and atraumatic  Right Ear: External ear normal    Left Ear: External ear normal    Nose: Nose normal    Mouth/Throat: Oropharynx is clear and moist    Eyes: Conjunctivae are normal    Neck: Normal range of motion  Neck supple  Cardiovascular: Normal rate, regular rhythm and normal heart sounds  Exam reveals no gallop and no friction rub  No murmur heard  Pulmonary/Chest: Breath sounds normal  She has no wheezes  She has no rales  tachypneic with conversational dyspnea   Abdominal: Soft  Bowel sounds are normal  She exhibits no distension  There is no tenderness  There is no guarding  Musculoskeletal: Normal range of motion  She exhibits no edema or deformity  Neurological: She is alert and oriented to person, place, and time  She exhibits normal muscle tone  Skin: Skin is warm and dry  She is not diaphoretic         Vital Signs  ED Triage Vitals [04/01/20 0842]   Temperature Pulse Respirations Blood Pressure SpO2   99 8 °F (37 7 °C) (!) 115 (!) 24 137/72 95 %      Temp Source Heart Rate Source Patient Position - Orthostatic VS BP Location FiO2 (%)   Tympanic Monitor Lying Right arm --      Pain Score       --           Vitals:    04/01/20 1015 04/01/20 1030 04/01/20 1100 04/01/20 1115   BP: 111/67 108/67 99/67 104/66   Pulse: 105 104 102 102   Patient Position - Orthostatic VS: Lying Lying Lying Lying         Visual Acuity      ED Medications  Medications   acetaminophen (TYLENOL) tablet 650 mg (650 mg Oral Given 4/1/20 0903)       Diagnostic Studies  Results Reviewed     Procedure Component Value Units Date/Time    Lactic acid x2 [[id number]]  (Normal) Collected:  04/01/20 0858    Lab Status:  Final result Specimen:  Blood from Arm, Right Updated:  04/01/20 0927     LACTIC ACID 1 3 mmol/L Narrative:       Result may be elevated if tourniquet was used during collection      Troponin I [[id number]]  (Normal) Collected:  04/01/20 0858    Lab Status:  Final result Specimen:  Blood from Arm, Right Updated:  04/01/20 0926     Troponin I <0 02 ng/mL     Comprehensive metabolic panel [[id number]]  (Abnormal) Collected:  04/01/20 0858    Lab Status:  Final result Specimen:  Blood from Arm, Right Updated:  04/01/20 0924     Sodium 134 mmol/L      Potassium 3 6 mmol/L      Chloride 99 mmol/L      CO2 25 mmol/L      ANION GAP 10 mmol/L      BUN 7 mg/dL      Creatinine 0 54 mg/dL      Glucose 164 mg/dL      Calcium 8 9 mg/dL      AST 22 U/L      ALT 25 U/L      Alkaline Phosphatase 65 U/L      Total Protein 7 3 g/dL      Albumin 3 3 g/dL      Total Bilirubin 0 40 mg/dL      eGFR 111 ml/min/1 73sq m     Narrative:       [place] guidelines for Chronic Kidney Disease (CKD):     Stage 1 with normal or high GFR (GFR > 90 mL/min/1 73 square meters)    Stage 2 Mild CKD (GFR = 60-89 mL/min/1 73 square meters)    Stage 3A Moderate CKD (GFR = 45-59 mL/min/1 73 square meters)    Stage 3B Moderate CKD (GFR = 30-44 mL/min/1 73 square meters)    Stage 4 Severe CKD (GFR = 15-29 mL/min/1 73 square meters)    Stage 5 End Stage CKD (GFR <15 mL/min/1 73 square meters)  Note: GFR calculation is accurate only with a steady state creatinine    Protime-INR [[id number]]  (Normal) Collected:  04/01/20 0858    Lab Status:  Final result Specimen:  Blood from Arm, Right Updated:  04/01/20 0920     Protime 12 5 seconds      INR 0 93    APTT [[id number]]  (Abnormal) Collected:  04/01/20 0858    Lab Status:  Final result Specimen:  Blood from Arm, Right Updated:  04/01/20 0920     PTT 39 seconds     CBC and differential [[id number]]  (Abnormal) Collected:  04/01/20 0858    Lab Status:  Final result Specimen:  Blood from Arm, Right Updated:  04/01/20 0909     WBC 5 31 Thousand/uL      RBC 4 66 Million/uL Hemoglobin 11 2 g/dL      Hematocrit 36 7 %      MCV 79 fL      MCH 24 0 pg      MCHC 30 5 g/dL      RDW 16 5 %      MPV 9 0 fL      Platelets 749 Thousands/uL      nRBC 0 /100 WBCs      Neutrophils Relative 82 %      Immat GRANS % 0 %      Lymphocytes Relative 13 %      Monocytes Relative 5 %      Eosinophils Relative 0 %      Basophils Relative 0 %      Neutrophils Absolute 4 29 Thousands/µL      Immature Grans Absolute 0 02 Thousand/uL      Lymphocytes Absolute 0 71 Thousands/µL      Monocytes Absolute 0 28 Thousand/µL      Eosinophils Absolute 0 00 Thousand/µL      Basophils Absolute 0 01 Thousands/µL     Procalcitonin [[id number]] Collected:  04/01/20 0858    Lab Status: In process Specimen:  Blood from Arm, Right Updated:  04/01/20 0904    Blood culture #1 [[id number]] Collected:  04/01/20 0900    Lab Status: In process Specimen:  Blood from Arm, Left Updated:  04/01/20 0904    Blood culture #2 [[id number]] Collected:  04/01/20 0858    Lab Status: In process Specimen:  Blood from Arm, Right Updated:  04/01/20 0904                 XR chest 1 view portable   Final Result by Jane Alaniz MD (04/01 0501)      Patchy bilateral opacity which could be due to viral infection given the patient's history  Workstation performed: [id number]                    Procedures  Procedures         ED Course                                 MDM  Number of Diagnoses or Management Options  Pneumonia due to COVID-19 virus: new and requires workup  Diagnosis management comments: Patient is ill-appearing  Febrile to 100 9°  She is tachypneic and conversationally dyspneic  Her SpO2 drops to 92% with ambulation, 96% at rest   She has a dry nonproductive cough  Chest x-ray with patchy bilateral opacities which could be due to viral infection  Blood cultures and lactic acid obtained, no lactic acidosis  No leukocytosis  Troponin undetectable    I personally interpreted the patient's EKG, which reveals sinus tachycardia to 109 beats per minute, normal axis, normal intervals, no ischemic changes  Coronavirus testing was not send here in the emergency department because it was sent on March 27th at [facility]  Based on patient's presentation, chest x-ray, and exposure, she is presumptively positive  Her fever, tachycardia, and tachypnea, are not thought to be due to underlying bacterial illness, so antibiotics were not initiated  No other evidence of bacterial illness on exam or by history  Patient requires assistance when attempting to ambulate secondary to generalized weakness  Her  has recently become ill as well, and pt patient states that she is concerned she would be unable to care for herself at home  Will admit for dyspnea and generalized weakness in the setting of presumed COVID-19  Amount and/or Complexity of Data Reviewed  Clinical lab tests: ordered and reviewed  Tests in the radiology section of CPT®: ordered and reviewed  Independent visualization of images, tracings, or specimens: yes    Patient Progress  Patient progress: stable        Disposition  Final diagnoses:   Pneumonia due to COVID-19 virus - suspected     Time reflects when diagnosis was documented in both MDM as applicable and the Disposition within this note     Time User Action Codes Description Comment    4/1/2020 10:58 AM Aniket Mckeon Add [U07 1,  J12 89] Pneumonia due to COVID-19 virus     4/1/2020 10:58 AM Aniket Mckeon Modify [U07 1,  J12 89] Pneumonia due to COVID-19 virus suspected      ED Disposition     ED Disposition Condition Date/Time Comment    Admit Stable Wed Apr 1, 2020 10:58 AM Case was discussed with ALYSSA and the patient's admission status was agreed to be Admission Status: inpatient status to the service of Dr Ayla Chou  Follow-up Information    None         Patient's Medications   Discharge Prescriptions    No medications on file     No discharge procedures on file      PDMP Review     None ED Provider  Electronically Signed by           Alonso Bermudez MD  04/01/20 1134

## 2020-04-01 NOTE — PLAN OF CARE
Problem: Potential for Falls  Goal: Patient will remain free of falls  Description  INTERVENTIONS:  - Assess patient frequently for physical needs  -  Identify cognitive and physical deficits and behaviors that affect risk of falls    -  [place] fall precautions as indicated by assessment   - Educate patient/family on patient safety including physical limitations  - Instruct patient to call for assistance with activity based on assessment  - Modify environment to reduce risk of injury  - Consider OT/PT consult to assist with strengthening/mobility  Outcome: Progressing     Problem: PAIN - ADULT  Goal: Verbalizes/displays adequate comfort level or baseline comfort level  Description  Interventions:  - Encourage patient to monitor pain and request assistance  - Assess pain using appropriate pain scale  - Administer analgesics based on type and severity of pain and evaluate response  - Implement non-pharmacological measures as appropriate and evaluate response  - Consider cultural and social influences on pain and pain management  - Notify physician/advanced practitioner if interventions unsuccessful or patient reports new pain  Outcome: Progressing     Problem: INFECTION - ADULT  Goal: Absence or prevention of progression during hospitalization  Description  INTERVENTIONS:  - Assess and monitor for signs and symptoms of infection  - Monitor lab/diagnostic results  - Monitor all insertion sites, i e  indwelling lines, tubes, and drains  - Monitor endotracheal if appropriate and nasal secretions for changes in amount and color  - [place] appropriate cooling/warming therapies per order  - Administer medications as ordered  - Instruct and encourage patient and family to use good hand hygiene technique  - Identify and instruct in appropriate isolation precautions for identified infection/condition  Outcome: Progressing  Goal: Absence of fever/infection during neutropenic period  Description  INTERVENTIONS:  - Monitor WBC    Outcome: Progressing     Problem: SAFETY ADULT  Goal: Maintain or return to baseline ADL function  Description  INTERVENTIONS:  -  Assess patient's ability to carry out ADLs; assess patient's baseline for ADL function and identify physical deficits which impact ability to perform ADLs (bathing, care of mouth/teeth, toileting, grooming, dressing, etc )  - Assess/evaluate cause of self-care deficits   - Assess range of motion  - Assess patient's mobility; develop plan if impaired  - Assess patient's need for assistive devices and provide as appropriate  - Encourage maximum independence but intervene and supervise when necessary  - Involve family in performance of ADLs  - Assess for home care needs following discharge   - Consider OT consult to assist with ADL evaluation and planning for discharge  - Provide patient education as appropriate  Outcome: Progressing  Goal: Maintain or return mobility status to optimal level  Description  INTERVENTIONS:  - Assess patient's baseline mobility status (ambulation, transfers, stairs, etc )    - Identify cognitive and physical deficits and behaviors that affect mobility  - Identify mobility aids required to assist with transfers and/or ambulation (gait belt, sit-to-stand, lift, walker, cane, etc )  - [place] fall precautions as indicated by assessment  - Record patient progress and toleration of activity level on Mobility SBAR; progress patient to next Phase/Stage  - Instruct patient to call for assistance with activity based on assessment  - Consider rehabilitation consult to assist with strengthening/weightbearing, etc   Outcome: Progressing     Problem: DISCHARGE PLANNING  Goal: Discharge to home or other facility with appropriate resources  Description  INTERVENTIONS:  - Identify barriers to discharge w/patient and caregiver  - Arrange for needed discharge resources and transportation as appropriate  - Identify discharge learning needs (meds, wound care, etc )  - Arrange for interpretive services to assist at discharge as needed  - Refer to Case Management Department for coordinating discharge planning if the patient needs post-hospital services based on physician/advanced practitioner order or complex needs related to functional status, cognitive ability, or social support system  Outcome: Progressing     Problem: Knowledge Deficit  Goal: Patient/family/caregiver demonstrates understanding of disease process, treatment plan, medications, and discharge instructions  Description  Complete learning assessment and assess knowledge base    Interventions:  - Provide teaching at level of understanding  - Provide teaching via preferred learning methods  Outcome: Progressing

## 2020-04-01 NOTE — H&P
H&P- Suleiman De Leon 1970, 53 y o  female MRN: [id number]    Unit/Bed#: -01 Encounter: [id number]    Primary Care Provider: Vera Lucas MD   Date and time admitted to hospital: 4/1/2020  8:41 AM        Type 2 diabetes mellitus, with long-term current use of insulin [facility])  Assessment & Plan  Lab Results   Component Value Date    HGBA1C 8 7 (H) 06/07/2018       Recent Labs     04/01/20  1307   POCGLU 86       Blood Sugar Average: Last 72 hrs:  (P) 86     Continue glimepiride  Hold metformin  Sliding scale  Viral respiratory infection  Assessment & Plan  Follow-up of the result for COVID 19  Close follow  Monitor pulse ox frequently  Hyperlipidemia  Assessment & Plan  Continue statin    Hypertension  Assessment & Plan  Continue home medications  Monitor closely    * SIRS (systemic inflammatory response syndrome) ([facility] [address] )  Assessment & Plan  Patient came with shortness of breath, cough and fever  In ER she was tachycardic and tachypneic  Saturating well on room air  Sirs secondary to most likely viral infection  Presumptive COVID 19  Airborne and contact isolation  We will on azithromycin and hydroxychloroquine  Order EKG to monitor QTC  Influenza and RSV negative  Will check procalcitonin level in the morning  Lactic acid normal   Blood culture pending  Follow-up the result  If worsening will consider pulmonary/infectious disease evaluation  VTE Prophylaxis: Enoxaparin (Lovenox)  / sequential compression device   Code Status:  Full code  POLST: POLST form is on file already (pre-hospital)  Discussion with family:     Anticipated Length of Stay:  Patient will be admitted on an Inpatient basis with an anticipated length of stay of  more than 2 midnights     Justification for Hospital Stay:  Vital respiratory infection    Total Time for Visit, including Counseling / Coordination of Care:  Greater than 50% of this total time spent on direct patient counseling and coordination of care  Chief Complaint:   Cough, short of breath, fever    History of Present Illness:    Dimple Mendez is a 53 y o  female who presents with cough, shortness of breath and fever over 1 week  Cough is nonproductive  She was also having body ache and headache  She went for outpatient COVID -19 testing on March 27th  Results still pending  In the meantime she developed more short of breath and fever  She decided to come to emergency room today  Denies any chest pain or palpitation  In the emergency room she was found to be febrile, tachycardic and tachypneic  Chest x-ray concerned for viral pneumonia  Influenza and RSV negative  Patient also having diarrhea off and on  No bowel movement since yesterday    Review of Systems:    Review of Systems   Constitutional: Positive for fever  Negative for chills  HENT: Negative for ear pain, nosebleeds, sneezing, sore throat, tinnitus and voice change  Eyes: Negative for pain and visual disturbance  Respiratory: Positive for cough and shortness of breath  Negative for chest tightness and wheezing  Cardiovascular: Negative for chest pain, palpitations and leg swelling  Gastrointestinal: Negative for abdominal distention, abdominal pain, blood in stool, nausea and vomiting  Endocrine: Negative for cold intolerance  Genitourinary: Negative for dysuria, flank pain, frequency, hematuria and urgency  Musculoskeletal: Positive for myalgias  Negative for back pain and joint swelling  Skin: Negative for pallor and rash  Neurological: Positive for headaches  Negative for dizziness, speech difficulty, light-headedness and numbness  Psychiatric/Behavioral: Negative for agitation and confusion         Past Medical and Surgical History:     Past Medical History:   Diagnosis Date    Cancer [facility])     breast    Diabetes mellitus ([facility] [address] )     Hypertension        Past Surgical History:   Procedure Laterality Date    DILATION AND CURETTAGE OF UTERUS      LYMPHADENECTOMY         Meds/Allergies:    Prior to Admission medications    Medication Sig Start Date End Date Taking? Authorizing Provider   atorvastatin (LIPITOR) 20 mg tablet Take 20 mg by mouth daily   Yes Historical Provider, MD   metFORMIN (GLUCOPHAGE) 1000 MG tablet Take 1,000 mg by mouth 2 (two) times a day with meals   Yes Historical Provider, MD   Dapagliflozin Propanediol (FARXIGA) 10 MG TABS Take by mouth daily    Historical Provider, MD   glimepiride (AMARYL) 2 mg tablet Take 2 mg by mouth every morning before breakfast    Historical Provider, MD   irbesartan (AVAPRO) 75 mg tablet Take 75 mg by mouth daily at bedtime    Historical Provider, MD   oxyCODONE-acetaminophen (PERCOCET) 5-325 mg per tablet Take 1 tablet by mouth every 4 (four) hours as needed for moderate pain for up to 10 dosesMax Daily Amount: 6 tablets  Patient not taking: Reported on 4/1/2020 12/13/19   Kim Gibbs MD   predniSONE 10 mg tablet 4 tabs days 1-4, 3 tabs days 5-8, 2 tabs days 9-12, 1 tab days 13-16  Take full dose in am with breakfast   Patient not taking: Reported on 4/1/2020 3/31/18   Ananda Suresh PA-C   predniSONE 20 mg tablet Take 2 tablets (40 mg total) by mouth daily  Patient not taking: Reported on 4/1/2020 12/13/19   Kim Gibbs MD   tamoxifen (NOLVADEX) 20 mg tablet Take 20 mg by mouth 2 (two) times a day    Historical Provider, MD     I have reviewed home medications with patient personally  Allergies:    Allergies   Allergen Reactions    Dapagliflozin      Facial swelling       Social History:     Marital Status: /Civil Union   Occupation:   Patient Pre-hospital Living Situation: home  Patient Pre-hospital Level of Mobility:  Ambulatory  Patient Pre-hospital Diet Restrictions:  Diabetic  Substance Use History:   Social History     Substance and Sexual Activity   Alcohol Use Yes    Comment: socially     Social History     Tobacco Use   Smoking Status Never Smoker   Smokeless Tobacco Never Used     Social History     Substance and Sexual Activity   Drug Use No       Family History:    History reviewed  No pertinent family history  Physical Exam:     Vitals:   Blood Pressure: 100/58 (04/01/20 1147)  Pulse: 100 (04/01/20 1147)  Temperature: 98 6 °F (37 °C) (04/01/20 1147)  Temp Source: Oral (04/01/20 1147)  Respirations: 22 (04/01/20 1147)  Height: 5' 4" (162 6 cm) (04/01/20 0842)  Weight - Scale: 53 1 kg (117 lb 1 oz) (04/01/20 0842)  SpO2: 98 % (04/01/20 1409)    Physical Exam    Limited due to covid    General- Awake, alert and oriented x 3, looks comfortable  HEENT- Normocephalic, atraumatic  Neck- Supple  CVS- Normal S1/ S2  Respiratory system- not using accessory muscle  Abdomen- Soft, Non distended  Musculoskeletal- No gross deformity  Psych- No acute psychosis  CNS- moving all extremities    Additional Data:     Lab Results: I have personally reviewed pertinent reports  Results from last 7 days   Lab Units 04/01/20  0858   WBC Thousand/uL 5 31   HEMOGLOBIN g/dL 11 2*   HEMATOCRIT % 36 7   PLATELETS Thousands/uL 255   NEUTROS PCT % 82*   LYMPHS PCT % 13*   MONOS PCT % 5   EOS PCT % 0     Results from last 7 days   Lab Units 04/01/20  0858   SODIUM mmol/L 134*   POTASSIUM mmol/L 3 6   CHLORIDE mmol/L 99*   CO2 mmol/L 25   BUN mg/dL 7   CREATININE mg/dL 0 54*   ANION GAP mmol/L 10   CALCIUM mg/dL 8 9   ALBUMIN g/dL 3 3*   TOTAL BILIRUBIN mg/dL 0 40   ALK PHOS U/L 65   ALT U/L 25   AST U/L 22   GLUCOSE RANDOM mg/dL 164*     Results from last 7 days   Lab Units 04/01/20  0858   INR  0 93     Results from last 7 days   Lab Units 04/01/20  1307   POC GLUCOSE mg/dl 86         Results from last 7 days   Lab Units 04/01/20  0858   LACTIC ACID mmol/L 1 3   PROCALCITONIN ng/ml <0 05       Imaging: I have personally reviewed pertinent reports        XR chest 1 view portable   Final Result by Janna Chaudhry MD (04/01 0401)      Patchy bilateral opacity which could be due to viral infection given the patient's history  Workstation performed: [id number]             EKG, Pathology, and Other Studies Reviewed on Admission:   · EKG:  Requested    Allscripts / Epic Records Reviewed: Yes     ** Please Note: This note has been constructed using a voice recognition system   **

## 2020-04-01 NOTE — ED NOTES
1  CC SOB  2  Orientation status Alert and Oriented  3  Abnormal labs, tests, vitals See chart  4  IV drips N/A  5  Time of last narcotics N/A  6  IV lines, drains, tubes 20G LAC  7  Isolation status Contact/Airborne  8  Skin Intact  9  Ambulation status Assist x1 (pt c/o weakness and dizziness)  10   ED RN phone number ELICEO Moe [phone]     Jessica Chatterjee RN  04/01/20 9105

## 2020-04-01 NOTE — SOCIAL WORK
LOS ADMITTED  PATIENT IS NOT BUNDLE  PATIENT IS NOT A READMISSION  CM called patient via phone to complete CM open and review discharge planning  Patient reported that she lives with her  in [address] [place] [address] in a 3 bedroom [facility]  Patient uses garage to enter  Patient reported 1 NILO  15 step to 2nd floor  Patient denied DME  Denied Hx of VNA or STR  Patient reported spouse is POA  Patient drives  Patient works  Technician and  at [employer]  Patient uses [facility] [employer] [facility] or [facility] for 90 days supplies  Patient spouse will pick her up at discharge  CM reviewed d/c planning process including the following: identifying help at home, patient preference for d/c planning needs,  availability of treatment team to discuss questions or concerns patient and/or family may have regarding understanding medications and recognizing signs and symptoms once discharged  CM also encouraged patient to follow up with all recommended appointments after discharge  Patient advised of importance for patient and family to participate in managing patients medical well being  No needs at this time  No referrals placed  Cm department will follow through discharge

## 2020-04-01 NOTE — ED NOTES
Pt ambulated around room with stand by assist x 2  Pt c/o weakness and dizziness while ambulating  Provider made aware        Valentina Villagomez RN  04/01/20 9991

## 2020-04-01 NOTE — ASSESSMENT & PLAN NOTE
Patient came with shortness of breath, cough and fever  In ER she was tachycardic and tachypneic  Saturating well on room air  Sirs secondary to most likely viral infection  Presumptive COVID 19  Airborne and contact isolation  We will on azithromycin and hydroxychloroquine  Order EKG to monitor QTC  Influenza and RSV negative  Will check procalcitonin level in the morning  Lactic acid normal   Blood culture pending  Follow-up the result  If worsening will consider pulmonary/infectious disease evaluation

## 2020-04-02 LAB
ALBUMIN SERPL BCP-MCNC: 3.1 G/DL (ref 3.5–5)
ALP SERPL-CCNC: 67 U/L (ref 46–116)
ALT SERPL W P-5'-P-CCNC: 22 U/L (ref 12–78)
ANION GAP SERPL CALCULATED.3IONS-SCNC: 11 MMOL/L (ref 4–13)
AST SERPL W P-5'-P-CCNC: 20 U/L (ref 5–45)
BASOPHILS # BLD MANUAL: 0 THOUSAND/UL (ref 0–0.1)
BASOPHILS NFR MAR MANUAL: 0 % (ref 0–1)
BILIRUB SERPL-MCNC: 0.4 MG/DL (ref 0.2–1)
BUN SERPL-MCNC: 6 MG/DL (ref 5–25)
CALCIUM SERPL-MCNC: 9.2 MG/DL (ref 8.3–10.1)
CHLORIDE SERPL-SCNC: 100 MMOL/L (ref 100–108)
CO2 SERPL-SCNC: 27 MMOL/L (ref 21–32)
CREAT SERPL-MCNC: 0.51 MG/DL (ref 0.6–1.3)
EOSINOPHIL # BLD MANUAL: 0 THOUSAND/UL (ref 0–0.4)
EOSINOPHIL NFR BLD MANUAL: 0 % (ref 0–6)
ERYTHROCYTE [DISTWIDTH] IN BLOOD BY AUTOMATED COUNT: 16.4 % (ref 11.6–15.1)
EST. AVERAGE GLUCOSE BLD GHB EST-MCNC: 174 MG/DL
GFR SERPL CREATININE-BSD FRML MDRD: 113 ML/MIN/1.73SQ M
GLUCOSE SERPL-MCNC: 102 MG/DL (ref 65–140)
GLUCOSE SERPL-MCNC: 107 MG/DL (ref 65–140)
GLUCOSE SERPL-MCNC: 117 MG/DL (ref 65–140)
GLUCOSE SERPL-MCNC: 98 MG/DL (ref 65–140)
HBA1C MFR BLD: 7.7 %
HCT VFR BLD AUTO: 37.9 % (ref 34.8–46.1)
HGB BLD-MCNC: 11.5 G/DL (ref 11.5–15.4)
L PNEUMO1 AG UR QL IA.RAPID: NEGATIVE
LYMPHOCYTES # BLD AUTO: 1.28 THOUSAND/UL (ref 0.6–4.47)
LYMPHOCYTES # BLD AUTO: 24 % (ref 14–44)
MCH RBC QN AUTO: 24.1 PG (ref 26.8–34.3)
MCHC RBC AUTO-ENTMCNC: 30.3 G/DL (ref 31.4–37.4)
MCV RBC AUTO: 79 FL (ref 82–98)
MONOCYTES # BLD AUTO: 0.16 THOUSAND/UL (ref 0–1.22)
MONOCYTES NFR BLD: 3 % (ref 4–12)
NEUTROPHILS # BLD MANUAL: 3.83 THOUSAND/UL (ref 1.85–7.62)
NEUTS SEG NFR BLD AUTO: 72 % (ref 43–75)
NRBC BLD AUTO-RTO: 0 /100 WBCS
PLATELET # BLD AUTO: 325 THOUSANDS/UL (ref 149–390)
PLATELET BLD QL SMEAR: ADEQUATE
PMV BLD AUTO: 9.8 FL (ref 8.9–12.7)
POTASSIUM SERPL-SCNC: 3.9 MMOL/L (ref 3.5–5.3)
PROCALCITONIN SERPL-MCNC: <0.05 NG/ML
PROT SERPL-MCNC: 7.7 G/DL (ref 6.4–8.2)
RBC # BLD AUTO: 4.78 MILLION/UL (ref 3.81–5.12)
SODIUM SERPL-SCNC: 138 MMOL/L (ref 136–145)
TOTAL CELLS COUNTED SPEC: 100
VARIANT LYMPHS # BLD AUTO: 1 %
WBC # BLD AUTO: 5.32 THOUSAND/UL (ref 4.31–10.16)

## 2020-04-02 PROCEDURE — 80053 COMPREHEN METABOLIC PANEL: CPT | Performed by: INTERNAL MEDICINE

## 2020-04-02 PROCEDURE — 85007 BL SMEAR W/DIFF WBC COUNT: CPT | Performed by: INTERNAL MEDICINE

## 2020-04-02 PROCEDURE — 87635 SARS-COV-2 COVID-19 AMP PRB: CPT | Performed by: INTERNAL MEDICINE

## 2020-04-02 PROCEDURE — 84145 PROCALCITONIN (PCT): CPT | Performed by: INTERNAL MEDICINE

## 2020-04-02 PROCEDURE — 99232 SBSQ HOSP IP/OBS MODERATE 35: CPT | Performed by: INTERNAL MEDICINE

## 2020-04-02 PROCEDURE — 83036 HEMOGLOBIN GLYCOSYLATED A1C: CPT | Performed by: INTERNAL MEDICINE

## 2020-04-02 PROCEDURE — 82948 REAGENT STRIP/BLOOD GLUCOSE: CPT

## 2020-04-02 PROCEDURE — 85027 COMPLETE CBC AUTOMATED: CPT | Performed by: INTERNAL MEDICINE

## 2020-04-02 RX ADMIN — ATORVASTATIN CALCIUM 20 MG: 20 TABLET, FILM COATED ORAL at 08:03

## 2020-04-02 RX ADMIN — ACETAMINOPHEN 650 MG: 325 TABLET ORAL at 19:19

## 2020-04-02 RX ADMIN — AZITHROMYCIN 500 MG: 250 TABLET, FILM COATED ORAL at 14:21

## 2020-04-02 RX ADMIN — OXYCODONE HYDROCHLORIDE AND ACETAMINOPHEN 500 MG: 500 TABLET ORAL at 19:20

## 2020-04-02 RX ADMIN — HYDROXYCHLOROQUINE SULFATE 200 MG: 200 TABLET, FILM COATED ORAL at 19:19

## 2020-04-02 RX ADMIN — ENOXAPARIN SODIUM 40 MG: 40 INJECTION SUBCUTANEOUS at 10:12

## 2020-04-02 RX ADMIN — GLIMEPIRIDE 2 MG: 2 TABLET ORAL at 08:03

## 2020-04-02 RX ADMIN — OXYCODONE HYDROCHLORIDE AND ACETAMINOPHEN 500 MG: 500 TABLET ORAL at 08:03

## 2020-04-02 RX ADMIN — LOSARTAN POTASSIUM 25 MG: 25 TABLET, FILM COATED ORAL at 08:03

## 2020-04-02 RX ADMIN — HYDROXYCHLOROQUINE SULFATE 200 MG: 200 TABLET, FILM COATED ORAL at 08:03

## 2020-04-02 NOTE — PLAN OF CARE
Problem: Potential for Falls  Goal: Patient will remain free of falls  Description  INTERVENTIONS:  - Assess patient frequently for physical needs  -  Identify cognitive and physical deficits and behaviors that affect risk of falls    -  [place] fall precautions as indicated by assessment   - Educate patient/family on patient safety including physical limitations  - Instruct patient to call for assistance with activity based on assessment  - Modify environment to reduce risk of injury  - Consider OT/PT consult to assist with strengthening/mobility  Outcome: Progressing     Problem: PAIN - ADULT  Goal: Verbalizes/displays adequate comfort level or baseline comfort level  Description  Interventions:  - Encourage patient to monitor pain and request assistance  - Assess pain using appropriate pain scale  - Administer analgesics based on type and severity of pain and evaluate response  - Implement non-pharmacological measures as appropriate and evaluate response  - Consider cultural and social influences on pain and pain management  - Notify physician/advanced practitioner if interventions unsuccessful or patient reports new pain  Outcome: Progressing     Problem: INFECTION - ADULT  Goal: Absence or prevention of progression during hospitalization  Description  INTERVENTIONS:  - Assess and monitor for signs and symptoms of infection  - Monitor lab/diagnostic results  - Monitor all insertion sites, i e  indwelling lines, tubes, and drains  - Monitor endotracheal if appropriate and nasal secretions for changes in amount and color  - [place] appropriate cooling/warming therapies per order  - Administer medications as ordered  - Instruct and encourage patient and family to use good hand hygiene technique  - Identify and instruct in appropriate isolation precautions for identified infection/condition  Outcome: Progressing  Goal: Absence of fever/infection during neutropenic period  Description  INTERVENTIONS:  - Monitor WBC    Outcome: Progressing     Problem: SAFETY ADULT  Goal: Maintain or return to baseline ADL function  Description  INTERVENTIONS:  -  Assess patient's ability to carry out ADLs; assess patient's baseline for ADL function and identify physical deficits which impact ability to perform ADLs (bathing, care of mouth/teeth, toileting, grooming, dressing, etc )  - Assess/evaluate cause of self-care deficits   - Assess range of motion  - Assess patient's mobility; develop plan if impaired  - Assess patient's need for assistive devices and provide as appropriate  - Encourage maximum independence but intervene and supervise when necessary  - Involve family in performance of ADLs  - Assess for home care needs following discharge   - Consider OT consult to assist with ADL evaluation and planning for discharge  - Provide patient education as appropriate  Outcome: Progressing  Goal: Maintain or return mobility status to optimal level  Description  INTERVENTIONS:  - Assess patient's baseline mobility status (ambulation, transfers, stairs, etc )    - Identify cognitive and physical deficits and behaviors that affect mobility  - Identify mobility aids required to assist with transfers and/or ambulation (gait belt, sit-to-stand, lift, walker, cane, etc )  - [place] fall precautions as indicated by assessment  - Record patient progress and toleration of activity level on Mobility SBAR; progress patient to next Phase/Stage  - Instruct patient to call for assistance with activity based on assessment  - Consider rehabilitation consult to assist with strengthening/weightbearing, etc   Outcome: Progressing     Problem: DISCHARGE PLANNING  Goal: Discharge to home or other facility with appropriate resources  Description  INTERVENTIONS:  - Identify barriers to discharge w/patient and caregiver  - Arrange for needed discharge resources and transportation as appropriate  - Identify discharge learning needs (meds, wound care, etc )  - Arrange for interpretive services to assist at discharge as needed  - Refer to Case Management Department for coordinating discharge planning if the patient needs post-hospital services based on physician/advanced practitioner order or complex needs related to functional status, cognitive ability, or social support system  Outcome: Progressing     Problem: Knowledge Deficit  Goal: Patient/family/caregiver demonstrates understanding of disease process, treatment plan, medications, and discharge instructions  Description  Complete learning assessment and assess knowledge base    Interventions:  - Provide teaching at level of understanding  - Provide teaching via preferred learning methods  Outcome: Progressing

## 2020-04-02 NOTE — ASSESSMENT & PLAN NOTE
Lab Results   Component Value Date    HGBA1C 7 7 (H) 04/02/2020       Recent Labs     04/01/20  1307 04/01/20  1644 04/01/20 2053 04/02/20  1150   POCGLU 86 127 122 102       Blood Sugar Average: Last 72 hrs:  (P) 109 25     Continue glimepiride  Hold metformin  Sliding scale

## 2020-04-02 NOTE — ASSESSMENT & PLAN NOTE
Patient came with shortness of breath, cough and fever  In ER she was tachycardic and tachypneic  Saturating well on room air  Sirs secondary to most likely viral infection  Presumptive COVID 19  Airborne and contact isolation  We will on azithromycin and hydroxychloroquine    Influenza and RSV negative  Procalcitonin less than 0 05  Lactic acid normal   Blood culture pending  Follow-up the result  If worsening will consider pulmonary/infectious disease evaluation  I called her outpatient lab, results still pending for covid    Will resend it here

## 2020-04-02 NOTE — PROGRESS NOTES
Progress Note - Jamarcus Suarez 1970, 53 y o  female MRN: [id number]    Unit/Bed#: -01 Encounter: [id number]    Primary Care Provider: Werner Valdez MD   Date and time admitted to hospital: 4/1/2020  8:41 AM        Type 2 diabetes mellitus, without long-term current use of insulin [facility])  Assessment & Plan  Lab Results   Component Value Date    HGBA1C 7 7 (H) 04/02/2020       Recent Labs     04/01/20  1307 04/01/20  1644 04/01/20  2053 04/02/20  1150   POCGLU 86 127 122 102       Blood Sugar Average: Last 72 hrs:  (P) 109 25     Continue glimepiride  Hold metformin  Sliding scale  Viral respiratory infection  Assessment & Plan  Follow-up of the result for COVID 19  Monitor pulse ox frequently  Hyperlipidemia  Assessment & Plan  Continue statin    Hypertension  Assessment & Plan  Continue home medications  Monitor closely    * SIRS (systemic inflammatory response syndrome) ([facility] [address] )  Assessment & Plan  Patient came with shortness of breath, cough and fever  In ER she was tachycardic and tachypneic  Saturating well on room air  Sirs secondary to most likely viral infection  Presumptive COVID 19  Airborne and contact isolation  We will on azithromycin and hydroxychloroquine    Influenza and RSV negative  Procalcitonin less than 0 05  Lactic acid normal   Blood culture pending  Follow-up the result  If worsening will consider pulmonary/infectious disease evaluation  I called her outpatient lab, results still pending for covid  Will resend it here        VTE Pharmacologic Prophylaxis:   Pharmacologic: Enoxaparin (Lovenox)  Mechanical VTE Prophylaxis in Place: Yes    Patient Centered Rounds: I have performed bedside rounds with nursing staff today  Discussions with Specialists or Other Care Team Provider:     Education and Discussions with Family / Patient:     Time Spent for Care:   More than 50% of total time spent on counseling and coordination of care as described above     Current Length of Stay: 1 day(s)    Current Patient Status: Inpatient   Certification Statement: The patient will continue to require additional inpatient hospital stay due to Cough    Discharge Plan:  Not clear yet    Code Status: Level 1 - Full Code      Subjective:   I have seen the patient this morning  Patient still coughing, complaining about short of breath  She has breathing on room air  Afebrile overnight    Objective:     Vitals:   Temp (24hrs), Av 4 °F (36 9 °C), Min:98 4 °F (36 9 °C), Max:98 4 °F (36 9 °C)    Temp:  [98 4 °F (36 9 °C)] 98 4 °F (36 9 °C)  HR:  [] 95  Resp:  [22] 22  BP: (122-125)/(55-58) 122/58  SpO2:  [94 %-96 %] 96 %  Body mass index is 20 09 kg/m²       Input and Output Summary (last 24 hours):     No intake or output data in the 24 hours ending 20 1615    Physical Exam:     Physical Exam  Limited due to covid rule out    General- Awake, alert and oriented x 3, looks comfortable  Respiratory system- not using any accessory muscle  Psych- No acute psychosis  CNS-  moving all extremity    Additional Data:     Labs:    Results from last 7 days   Lab Units 20  0455 20  0858   WBC Thousand/uL 5 32 5 31   HEMOGLOBIN g/dL 11 5 11 2*   HEMATOCRIT % 37 9 36 7   PLATELETS Thousands/uL 325 255   NEUTROS PCT %  --  82*   LYMPHS PCT %  --  13*   LYMPHO PCT % 24  --    MONOS PCT %  --  5   MONO PCT % 3*  --    EOS PCT % 0 0     Results from last 7 days   Lab Units 20  0455   SODIUM mmol/L 138   POTASSIUM mmol/L 3 9   CHLORIDE mmol/L 100   CO2 mmol/L 27   BUN mg/dL 6   CREATININE mg/dL 0 51*   ANION GAP mmol/L 11   CALCIUM mg/dL 9 2   ALBUMIN g/dL 3 1*   TOTAL BILIRUBIN mg/dL 0 40   ALK PHOS U/L 67   ALT U/L 22   AST U/L 20   GLUCOSE RANDOM mg/dL 117     Results from last 7 days   Lab Units 20  0858   INR  0 93     Results from last 7 days   Lab Units 20  1542 20  1150 20  2053 20  1644 20  1307   POC GLUCOSE mg/dl 98 102 122 127 86     Results from last 7 days   Lab Units 04/02/20  0455   HEMOGLOBIN A1C % 7 7*     Results from last 7 days   Lab Units 04/02/20  0455 04/01/20  0858   LACTIC ACID mmol/L  --  1 3   PROCALCITONIN ng/ml <0 05 <0 05           * I Have Reviewed All Lab Data Listed Above  * Additional Pertinent Lab Tests Reviewed: All Labs Within Last 24 Hours Reviewed    Imaging:    Imaging Reports Reviewed Today Include:   Imaging Personally Reviewed by Myself Includes:      Recent Cultures (last 7 days):     Results from last 7 days   Lab Units 04/01/20  1750 04/01/20  0900 04/01/20  0858   BLOOD CULTURE   --  No Growth at 24 hrs  No Growth at 24 hrs  LEGIONELLA URINARY ANTIGEN  Negative  --   --        Last 24 Hours Medication List:     Current Facility-Administered Medications:  acetaminophen 650 mg Oral Q6H PRN Ajith Dorado MD   ascorbic acid 500 mg Oral BID Ajith Dorado MD   atorvastatin 20 mg Oral Daily Ajith Doardo MD   azithromycin 500 mg Oral Q24H Ajith Dorado MD   dextromethorphan-guaiFENesin 10 mL Oral Q4H PRN Ajith Dorado MD   enoxaparin 40 mg Subcutaneous Q24H [address] Ajith Dorado MD   glimepiride 2 mg Oral Daily With Breakfast Ajith Dorado MD   hydroxychloroquine 200 mg Oral BID Ajith Dorado MD   insulin lispro 1-6 Units Subcutaneous TID AC Ajith Dorado MD   insulin lispro 1-6 Units Subcutaneous HS Ajith Dorado MD   losartan 25 mg Oral Daily Ajith Dorado MD        Today, Patient Was Seen By: Verito Louis MD    ** Please Note: Dictation voice to text software may have been used in the creation of this document   **

## 2020-04-02 NOTE — UTILIZATION REVIEW
Initial Clinical Review    Admission: Date/Time/Statement: Admission Orders (From admission, onward)     Ordered        04/01/20 1059  Inpatient Admission (expected length of stay for this patient Order details is greater than two midnights)  Once         04/01/20 1059  Inpatient Admission (expected length of stay for this patient Order details is greater than two midnights)  Once                   Orders Placed This Encounter   Procedures    Inpatient Admission (expected length of stay for this patient Order details is greater than two midnights)     Standing Status:   Standing     Number of Occurrences:   1     Order Specific Question:   Admitting Physician     Answer:   Ana Coppola [[id number]]     Order Specific Question:   Level of Care     Answer:   Med Surg [16]     Order Specific Question:   Estimated length of stay     Answer:   More than 2 Midnights     Order Specific Question:   Certification     Answer:   I certify that inpatient services are medically necessary for this patient for a duration of greater than two midnights  See H&P and MD Progress Notes for additional information about the patient's course of treatment   Inpatient Admission (expected length of stay for this patient Order details is greater than two midnights)     COVID rule-out     Standing Status:   Standing     Number of Occurrences:   1     Order Specific Question:   Admitting Physician     Answer:   Ana Coppola [[id number]]     Order Specific Question:   Level of Care     Answer:   Med Surg [16]     Order Specific Question:   Estimated length of stay     Answer:   More than 2 Midnights     Order Specific Question:   Certification     Answer:   I certify that inpatient services are medically necessary for this patient for a duration of greater than two midnights  See H&P and MD Progress Notes for additional information about the patient's course of treatment       ED Arrival Information     Expected Arrival Acuity Means of Arrival Escorted By Service Admission Type    - 4/1/2020 08:40 Emergent Ambulance [facility] Medicine Emergency    Arrival Complaint    cough        Chief Complaint   Patient presents with    Fever - 9 weeks to 74 years     Pt brought in via EMS for eval of cough, fevers, and SOB  Pt works at [employer], was exposed to someone who was confirmed +  Was tested for COVID, not yet resulted   Muscle Pain    Shortness of Breath     Assessment/Plan: 54 yo female to ED from home w/ cough and SOB and fever over 1 week  NP cough   + body aches and HA   OP testing for COVID on 3/27, pended   Developed SOB and fever  CXR concerning for viral pneumonia  Admitted IP status w/ SIRS found to be tachycardic and tachypnea cont on azithromycin and hydro chloroquine , EKG to monitor QTC , check PCT , bld cx pending   Consider pulm consult        PE : tachypneic with conversational dyspnea   ED Triage Vitals   Temperature Pulse Respirations Blood Pressure SpO2   04/01/20 0842 04/01/20 0842 04/01/20 0842 04/01/20 0842 04/01/20 0842   99 8 °F (37 7 °C) (!) 115 (!) 24 137/72 95 %      Temp Source Heart Rate Source Patient Position - Orthostatic VS BP Location FiO2 (%)   04/01/20 0842 04/01/20 0842 04/01/20 0842 04/01/20 0842 --   Tympanic Monitor Lying Right arm       Pain Score       04/01/20 1151       No Pain        Wt Readings from Last 1 Encounters:   04/01/20 53 1 kg (117 lb 1 oz)     Additional Vital Signs:   04/02/20 1333  --  --  --  --  --  96 %  None (Room air)  --   04/02/20 0700  98 4 °F (36 9 °C)  95  22  122/58  --  95 %  None (Room air)  Lying   04/02/20 0500  --  --  --  --  --  95 %  None (Room air)  --   04/02/20 0108  98 4 °F (36 9 °C)  109Abnormal   22  125/55  --  94 %  None (Room air)  --   04/01/20 2112  --  108Abnormal   --  --  --  94 %  None (Room air)  --   04/01/20 1409  --  --  --  --  --  98 %  None (Room air)  --   04/01/20 1321  --  --  --  --  --  99 %  None (Room air)  --   04/01/20 1147  98 6 °F (37 °C)  100  22  100/58  --  96 %  None (Room air)  Lying   04/01/20 1115  --  102  27Abnormal   104/66  80  96 %  None (Room air)  Lying   04/01/20 1100  --  102  29Abnormal   99/67  78  94 %  None (Room air)  Lying   04/01/20 1030  --  104  29Abnormal   108/67  83  93 %  None (Room air)  Lying   04/01/20 1015  --  105  29Abnormal   111/67  84  95 %  None (Room air)  Lying   04/01/20 0930  --  109Abnormal   29Abnormal   --  --  95 %  None (Room air)  --   04/01/20 0915  --  110Abnormal   32Abnormal   108/59  78  95 %  None (Room air)  Lying   04/01/20 0908  --  120Abnormal   24Abnormal   --  --  92 %  None (Room air)  --   04/01/20 0900  100 9 °F (38 3 °C)Abnormal   112Abnormal   22  138/84  --  95 %  None (Room air)  Lying   04/01/20 0846  --  --  --  --  --  --  None (Room air       Pertinent Labs/Diagnostic Test Results:   4/2 COVID 19 - pending   4/2 EKG -ST   4/1 EKG-ST   4/1 PCXR - Patchy bilateral opacity which could be due to viral infection given the patient's history    Results from last 7 days   Lab Units 04/02/20  0455 04/01/20  0858   WBC Thousand/uL 5 32 5 31   HEMOGLOBIN g/dL 11 5 11 2*   HEMATOCRIT % 37 9 36 7   PLATELETS Thousands/uL 325 255   NEUTROS ABS Thousands/µL  --  4 29     Results from last 7 days   Lab Units 04/02/20  0455 04/01/20  0858   SODIUM mmol/L 138 134*   POTASSIUM mmol/L 3 9 3 6   CHLORIDE mmol/L 100 99*   CO2 mmol/L 27 25   ANION GAP mmol/L 11 10   BUN mg/dL 6 7   CREATININE mg/dL 0 51* 0 54*   EGFR ml/min/1 73sq m 113 111   CALCIUM mg/dL 9 2 8 9     Results from last 7 days   Lab Units 04/02/20  0455 04/01/20  0858   AST U/L 20 22   ALT U/L 22 25   ALK PHOS U/L 67 65   TOTAL PROTEIN g/dL 7 7 7 3   ALBUMIN g/dL 3 1* 3 3*   TOTAL BILIRUBIN mg/dL 0 40 0 40     Results from last 7 days   Lab Units 04/02/20  1150 04/01/20  2053 04/01/20  1644 04/01/20  1307   POC GLUCOSE mg/dl 102 122 127 86     Results from last 7 days   Lab Units 04/02/20  0455 04/01/20  0858   GLUCOSE RANDOM mg/dL 117 164*     Results from last 7 days   Lab Units 04/02/20  0455   HEMOGLOBIN A1C % 7 7*   EAG mg/dl 174     Results from last 7 days   Lab Units 04/01/20  0858   TROPONIN I ng/mL <0 02     Results from last 7 days   Lab Units 04/01/20  0858   PROTIME seconds 12 5   INR  0 93   PTT seconds 39*     Results from last 7 days   Lab Units 04/02/20  0455 04/01/20  0858   PROCALCITONIN ng/ml <0 05 <0 05     Results from last 7 days   Lab Units 04/01/20  0858   LACTIC ACID mmol/L 1 3     Results from last 7 days   Lab Units 04/01/20  1750 04/01/20  1417 04/01/20  1310   STREP PNEUMONIAE ANTIGEN, URINE   --  Negative  --    LEGIONELLA URINARY ANTIGEN  Negative  --   --    INFLUENZA A PCR   --   --  None Detected   INFLUENZA B PCR   --   --  None Detected   RSV PCR   --   --  None Detected     Results from last 7 days   Lab Units 04/01/20  0900 04/01/20  0858   BLOOD CULTURE  No Growth at 24 hrs  No Growth at 24 hrs       Results from last 7 days   Lab Units 04/02/20  0455   TOTAL COUNTED  100       ED Treatment:   Medication Administration from 04/01/2020 0840 to 04/01/2020 1139       Date/Time Order Dose Route Action Action by Comments     04/01/2020 0903 acetaminophen (TYLENOL) tablet 650 mg 650 mg Oral Given Mynor Chan RN         Past Medical History:   Diagnosis Date    Cancer [facility])     breast    Diabetes mellitus ([facility] )     Hypertension      Present on Admission:   SIRS (systemic inflammatory response syndrome) ([facility] )   Hypertension   Hyperlipidemia   Viral respiratory infection      Admitting Diagnosis: Pneumonia due to COVID-19 virus [U07 1, J12 89]  Age/Sex: 53 y o  female  Admission Orders:  Scheduled Medications:    Medications:  ascorbic acid 500 mg Oral BID   atorvastatin 20 mg Oral Daily   azithromycin 500 mg Oral Q24H   enoxaparin 40 mg Subcutaneous Q24H NIESHA   glimepiride 2 mg Oral Daily With Breakfast   hydroxychloroquine 200 mg Oral BID   insulin lispro 1-6 Units Subcutaneous TID AC insulin lispro 1-6 Units Subcutaneous HS   losartan 25 mg Oral Daily     Continuous IV Infusions:     PRN Meds:    acetaminophen 650 mg Oral Q6H PRN   dextromethorphan-guaiFENesin 10 mL Oral Q4H PRN     Act as mey   Elevate HOB   Contact and airborne isolation   Aspiration precautions  Up and OOB as mey   Fingerstick ac and hs     Network Utilization Review Department  [email or web address]  ATTENTION: Please call with any questions or concerns to [phone] and carefully listen to the prompts so that you are directed to the right person  All voicemails are confidential   Krystle Pitt all requests for admission clinical reviews, approved or denied determinations and any other requests to dedicated fax number below belonging to the campus where the patient is receiving treatment   List of dedicated fax numbers for the Facilities:  [address] DENIALS (Administrative/Medical Necessity) [phone]   [address] (Maternity/NICU/Pediatrics) [phone] 5400 [facility] [phone]   [facility] [phone]   Dania Arriola [phone]   Marilyn Guerrero [phone]   [facility] [address] [place] [address] [phone]   [facility]  [phone]   [address] [facility] [address] [facility]  [address] [facility] [address] [facility] [phone]

## 2020-04-03 PROBLEM — U07.1 COVID-19 VIRUS DETECTED: Status: ACTIVE | Noted: 2020-04-03

## 2020-04-03 LAB
ALBUMIN SERPL BCP-MCNC: 3 G/DL (ref 3.5–5)
ALP SERPL-CCNC: 69 U/L (ref 46–116)
ALT SERPL W P-5'-P-CCNC: 24 U/L (ref 12–78)
ANION GAP SERPL CALCULATED.3IONS-SCNC: 13 MMOL/L (ref 4–13)
AST SERPL W P-5'-P-CCNC: 22 U/L (ref 5–45)
ATRIAL RATE: 88 BPM
ATRIAL RATE: 90 BPM
ATRIAL RATE: 90 BPM
BASOPHILS # BLD AUTO: 0.01 THOUSANDS/ΜL (ref 0–0.1)
BASOPHILS NFR BLD AUTO: 0 % (ref 0–1)
BILIRUB SERPL-MCNC: 0.3 MG/DL (ref 0.2–1)
BUN SERPL-MCNC: 8 MG/DL (ref 5–25)
CALCIUM SERPL-MCNC: 9.3 MG/DL (ref 8.3–10.1)
CHLORIDE SERPL-SCNC: 102 MMOL/L (ref 100–108)
CO2 SERPL-SCNC: 25 MMOL/L (ref 21–32)
CREAT SERPL-MCNC: 0.47 MG/DL (ref 0.6–1.3)
CRP SERPL QL: 73.9 MG/L
EOSINOPHIL # BLD AUTO: 0.02 THOUSAND/ΜL (ref 0–0.61)
EOSINOPHIL NFR BLD AUTO: 0 % (ref 0–6)
ERYTHROCYTE [DISTWIDTH] IN BLOOD BY AUTOMATED COUNT: 16.3 % (ref 11.6–15.1)
GFR SERPL CREATININE-BSD FRML MDRD: 116 ML/MIN/1.73SQ M
GLUCOSE SERPL-MCNC: 127 MG/DL (ref 65–140)
GLUCOSE SERPL-MCNC: 127 MG/DL (ref 65–140)
GLUCOSE SERPL-MCNC: 144 MG/DL (ref 65–140)
GLUCOSE SERPL-MCNC: 237 MG/DL (ref 65–140)
GLUCOSE SERPL-MCNC: 246 MG/DL (ref 65–140)
HCG SERPL QL: NEGATIVE
HCT VFR BLD AUTO: 37.2 % (ref 34.8–46.1)
HGB BLD-MCNC: 11.2 G/DL (ref 11.5–15.4)
IMM GRANULOCYTES # BLD AUTO: 0.02 THOUSAND/UL (ref 0–0.2)
IMM GRANULOCYTES NFR BLD AUTO: 0 % (ref 0–2)
INPATIENT: NORMAL
LYMPHOCYTES # BLD AUTO: 0.95 THOUSANDS/ΜL (ref 0.6–4.47)
LYMPHOCYTES NFR BLD AUTO: 21 % (ref 14–44)
MCH RBC QN AUTO: 23.5 PG (ref 26.8–34.3)
MCHC RBC AUTO-ENTMCNC: 30.1 G/DL (ref 31.4–37.4)
MCV RBC AUTO: 78 FL (ref 82–98)
MONOCYTES # BLD AUTO: 0.37 THOUSAND/ΜL (ref 0.17–1.22)
MONOCYTES NFR BLD AUTO: 8 % (ref 4–12)
NEUTROPHILS # BLD AUTO: 3.27 THOUSANDS/ΜL (ref 1.85–7.62)
NEUTS SEG NFR BLD AUTO: 71 % (ref 43–75)
NRBC BLD AUTO-RTO: 0 /100 WBCS
P AXIS: 58 DEGREES
P AXIS: 58 DEGREES
P AXIS: 59 DEGREES
PLATELET # BLD AUTO: 366 THOUSANDS/UL (ref 149–390)
PMV BLD AUTO: 8.9 FL (ref 8.9–12.7)
POTASSIUM SERPL-SCNC: 3.8 MMOL/L (ref 3.5–5.3)
PR INTERVAL: 164 MS
PR INTERVAL: 164 MS
PR INTERVAL: 168 MS
PROT SERPL-MCNC: 7.5 G/DL (ref 6.4–8.2)
QRS AXIS: 58 DEGREES
QRS AXIS: 58 DEGREES
QRS AXIS: 68 DEGREES
QRSD INTERVAL: 102 MS
QRSD INTERVAL: 106 MS
QRSD INTERVAL: 106 MS
QT INTERVAL: 354 MS
QT INTERVAL: 354 MS
QT INTERVAL: 366 MS
QTC INTERVAL: 433 MS
QTC INTERVAL: 433 MS
QTC INTERVAL: 442 MS
RBC # BLD AUTO: 4.77 MILLION/UL (ref 3.81–5.12)
SARS-COV-2 RNA SPEC QL NAA+PROBE: DETECTED
SODIUM SERPL-SCNC: 140 MMOL/L (ref 136–145)
T WAVE AXIS: 40 DEGREES
T WAVE AXIS: 40 DEGREES
T WAVE AXIS: 59 DEGREES
VENTRICULAR RATE: 88 BPM
VENTRICULAR RATE: 90 BPM
VENTRICULAR RATE: 90 BPM
WBC # BLD AUTO: 4.64 THOUSAND/UL (ref 4.31–10.16)

## 2020-04-03 PROCEDURE — 85025 COMPLETE CBC W/AUTO DIFF WBC: CPT | Performed by: INTERNAL MEDICINE

## 2020-04-03 PROCEDURE — 86140 C-REACTIVE PROTEIN: CPT | Performed by: INTERNAL MEDICINE

## 2020-04-03 PROCEDURE — 93005 ELECTROCARDIOGRAM TRACING: CPT

## 2020-04-03 PROCEDURE — 93010 ELECTROCARDIOGRAM REPORT: CPT | Performed by: INTERNAL MEDICINE

## 2020-04-03 PROCEDURE — 99255 IP/OBS CONSLTJ NEW/EST HI 80: CPT | Performed by: INTERNAL MEDICINE

## 2020-04-03 PROCEDURE — 82948 REAGENT STRIP/BLOOD GLUCOSE: CPT

## 2020-04-03 PROCEDURE — 99232 SBSQ HOSP IP/OBS MODERATE 35: CPT | Performed by: INTERNAL MEDICINE

## 2020-04-03 PROCEDURE — 80053 COMPREHEN METABOLIC PANEL: CPT | Performed by: INTERNAL MEDICINE

## 2020-04-03 PROCEDURE — 84703 CHORIONIC GONADOTROPIN ASSAY: CPT | Performed by: INTERNAL MEDICINE

## 2020-04-03 RX ORDER — AZITHROMYCIN 250 MG/1
250 TABLET, FILM COATED ORAL EVERY 24 HOURS
Status: DISCONTINUED | OUTPATIENT
Start: 2020-04-04 | End: 2020-04-06

## 2020-04-03 RX ADMIN — HYDROXYCHLOROQUINE SULFATE 200 MG: 200 TABLET, FILM COATED ORAL at 17:11

## 2020-04-03 RX ADMIN — GUAIFENESIN AND DEXTROMETHORPHAN 10 ML: 100; 10 SYRUP ORAL at 23:21

## 2020-04-03 RX ADMIN — AZITHROMYCIN 500 MG: 250 TABLET, FILM COATED ORAL at 12:42

## 2020-04-03 RX ADMIN — OXYCODONE HYDROCHLORIDE AND ACETAMINOPHEN 500 MG: 500 TABLET ORAL at 08:57

## 2020-04-03 RX ADMIN — GLIMEPIRIDE 2 MG: 2 TABLET ORAL at 08:56

## 2020-04-03 RX ADMIN — ATORVASTATIN CALCIUM 20 MG: 20 TABLET, FILM COATED ORAL at 08:57

## 2020-04-03 RX ADMIN — INSULIN LISPRO 3 UNITS: 100 INJECTION, SOLUTION INTRAVENOUS; SUBCUTANEOUS at 17:11

## 2020-04-03 RX ADMIN — OXYCODONE HYDROCHLORIDE AND ACETAMINOPHEN 500 MG: 500 TABLET ORAL at 17:11

## 2020-04-03 RX ADMIN — LOSARTAN POTASSIUM 25 MG: 25 TABLET, FILM COATED ORAL at 08:57

## 2020-04-03 RX ADMIN — INSULIN LISPRO 3 UNITS: 100 INJECTION, SOLUTION INTRAVENOUS; SUBCUTANEOUS at 12:42

## 2020-04-03 RX ADMIN — HYDROXYCHLOROQUINE SULFATE 200 MG: 200 TABLET, FILM COATED ORAL at 08:57

## 2020-04-03 RX ADMIN — ENOXAPARIN SODIUM 40 MG: 40 INJECTION SUBCUTANEOUS at 08:57

## 2020-04-03 NOTE — PROGRESS NOTES
Progress Note - Sol Calderon 1970, 53 y o  female MRN: [id number]    Unit/Bed#: -01 Encounter: [id number]    Primary Care Provider: Elise Gracia MD   Date and time admitted to hospital: 4/1/2020  8:41 AM        COVID-19 virus detected  Assessment & Plan  Infectious disease evaluation appreciated  Started on azithromycin also  Patient on hydroxychloroquine  Monitor QTC  Type 2 diabetes mellitus, without long-term current use of insulin [facility])  Assessment & Plan  Lab Results   Component Value Date    HGBA1C 7 7 (H) 04/02/2020       Recent Labs     04/02/20  1542 04/02/20  2104 04/03/20  0855 04/03/20  1212   POCGLU 98 107 127 246*       Blood Sugar Average: Last 72 hrs:  (P) 126 875     Continue glimepiride  Hold metformin  Sliding scale  Viral respiratory infection  Assessment & Plan  COVID 19 positive  Monitor pulse ox frequently  Hyperlipidemia  Assessment & Plan  Continue statin    Hypertension  Assessment & Plan  Continue home medications  Monitor closely    * SIRS (systemic inflammatory response syndrome) ([facility] [address] )  Assessment & Plan  Patient came with shortness of breath, cough and fever  In ER she was tachycardic and tachypneic  Saturating well on room air  Sirs secondary to most likely viral infection  COVID 19 positive  Airborne and contact isolation  We will on azithromycin and hydroxychloroquine    Influenza and RSV negative  Procalcitonin less than 0 05  Lactic acid normal   Blood culture no growth  Infectious disease on board        VTE Pharmacologic Prophylaxis:   Pharmacologic: Enoxaparin (Lovenox)  Mechanical VTE Prophylaxis in Place: Yes    Patient Centered Rounds: I have performed bedside rounds with nursing staff today  Discussions with Specialists or Other Care Team Provider:     Education and Discussions with Family / Patient:     Time Spent for Care:    More than 50% of total time spent on counseling and coordination of care as described above     Current Length of Stay: 2 day(s)    Current Patient Status: Inpatient   Certification Statement: The patient will continue to require additional inpatient hospital stay due to covid    Discharge Plan:  In next 24-48 hours    Code Status: Level 1 - Full Code      Subjective:   I have seen this patient this morning  Still coughing and mild short of breath  Denies any chest pain  Low-grade fever  Objective:     Vitals:   Temp (24hrs), Av 8 °F (37 1 °C), Min:98 1 °F (36 7 °C), Max:100 6 °F (38 1 °C)    Temp:  [98 1 °F (36 7 °C)-100 6 °F (38 1 °C)] 98 1 °F (36 7 °C)  HR:  [] 98  Resp:  [18-22] 18  BP: (100-118)/(60-70) 118/70  SpO2:  [95 %-99 %] 99 %  Body mass index is 20 09 kg/m²       Input and Output Summary (last 24 hours):     No intake or output data in the 24 hours ending 20 1449    Physical Exam:     Physical Exam   Limited due to covid      General- Awake, alert and oriented x 3, looks comfortable  Respiratory system- comfortable, not using accessory muscle  Psych- No acute psychosis  CNS- moving all extremities    Additional Data:     Labs:    Results from last 7 days   Lab Units 20  0619   WBC Thousand/uL 4 64   HEMOGLOBIN g/dL 11 2*   HEMATOCRIT % 37 2   PLATELETS Thousands/uL 366   NEUTROS PCT % 71   LYMPHS PCT % 21   MONOS PCT % 8   EOS PCT % 0     Results from last 7 days   Lab Units 20  0619   SODIUM mmol/L 140   POTASSIUM mmol/L 3 8   CHLORIDE mmol/L 102   CO2 mmol/L 25   BUN mg/dL 8   CREATININE mg/dL 0 47*   ANION GAP mmol/L 13   CALCIUM mg/dL 9 3   ALBUMIN g/dL 3 0*   TOTAL BILIRUBIN mg/dL 0 30   ALK PHOS U/L 69   ALT U/L 24   AST U/L 22   GLUCOSE RANDOM mg/dL 127     Results from last 7 days   Lab Units 20  0858   INR  0 93     Results from last 7 days   Lab Units 20  1212 20  0855 20  2104 20  1542 20  1150 20  2053 20  1644 20  1307   POC GLUCOSE mg/dl 246* 127 107 98 102 122 127 86     Results from last 7 days   Lab Units 04/02/20  0455   HEMOGLOBIN A1C % 7 7*     Results from last 7 days   Lab Units 04/02/20  0455 04/01/20  0858   LACTIC ACID mmol/L  --  1 3   PROCALCITONIN ng/ml <0 05 <0 05           * I Have Reviewed All Lab Data Listed Above  * Additional Pertinent Lab Tests Reviewed: All Labs Within Last 24 Hours Reviewed    Imaging:    Imaging Reports Reviewed Today Include:   Imaging Personally Reviewed by Myself Includes:      Recent Cultures (last 7 days):     Results from last 7 days   Lab Units 04/01/20  1750 04/01/20  0900 04/01/20  0858   BLOOD CULTURE   --  No Growth at 48 hrs  No Growth at 48 hrs  LEGIONELLA URINARY ANTIGEN  Negative  --   --        Last 24 Hours Medication List:     Current Facility-Administered Medications:  acetaminophen 650 mg Oral Q6H PRN Elyse Rajan MD   ascorbic acid 500 mg Oral BID Elyse Rajan MD   atorvastatin 20 mg Oral Daily Elyse Rajan MD   [START ON 4/4/2020] azithromycin 250 mg Oral Q24H Shanti Dodson MD   dextromethorphan-guaiFENesin 10 mL Oral Q4H PRN Elyse Rajan MD   enoxaparin 40 mg Subcutaneous Q24H Hansa Neff MD   glimepiride 2 mg Oral Daily With Breakfast Elyse Rajan MD   hydroxychloroquine 200 mg Oral BID Elyse Rajan MD   insulin lispro 1-6 Units Subcutaneous TID AC Elyse Rajan MD   insulin lispro 1-6 Units Subcutaneous HS Elyse Rajan MD   losartan 25 mg Oral Daily Elyse Rajan MD        Today, Patient Was Seen By: Yves Parker MD    ** Please Note: Dictation voice to text software may have been used in the creation of this document   **

## 2020-04-03 NOTE — ASSESSMENT & PLAN NOTE
Patient came with shortness of breath, cough and fever  In ER she was tachycardic and tachypneic  Saturating well on room air  Sirs secondary to most likely viral infection  COVID 19 positive  Airborne and contact isolation  We will on azithromycin and hydroxychloroquine    Influenza and RSV negative    Procalcitonin less than 0 05  Lactic acid normal   Blood culture no growth  Infectious disease on board

## 2020-04-03 NOTE — ASSESSMENT & PLAN NOTE
Lab Results   Component Value Date    HGBA1C 7 7 (H) 04/02/2020       Recent Labs     04/02/20  1542 04/02/20  2104 04/03/20  0855 04/03/20  1212   POCGLU 98 107 127 246*       Blood Sugar Average: Last 72 hrs:  (P) 126 875     Continue glimepiride  Hold metformin  Sliding scale

## 2020-04-03 NOTE — CONSULTS
Consultation - Infectious Disease   Martha Carrillo 53 y o  female MRN: [id number]  Unit/Bed#: -01 Encounter: [id number]      IMPRESSION & RECOMMENDATIONS:   Impression/Recommendations: This is a 53 y o  female, with underlying DM, admitted on 04/01 with clinical picture of viral pneumonia  Patient was treated with HQ/azithromycin  COVID PCR now is positive  1  SIRS, POA, presented with fever and tachycardia/tachypnea  Source is most likely pneumonia  Patient is clinically much improved  Temperature is trending down  Tachycardia and tachypnea has resolved  She remains systemically in hemodynamically well  Admission blood cultures remain negative  Antibiotics/antiviral plan as in below  Monitor temperature/WBC  Follow-up on pending blood cultures  2  COVID-19 pneumonia  Patient has been on HQ/azithromycin since admission 2 days ago  She is clinically much improved  Dyspnea and cough are improved  Temperature intermittent up but overall trending down with no further chills  Procalcitonin was normal, suggesting absence of secondary bacterial pneumonia  Baseline EKG had normal QTC  At this point, will have patient complete 5 day treatment course  Influenza PCR negative  Continue HQ/azithromycin  Monitor daily EKG  If QTC becomes prolonged, I was stop azithromycin  Treat x5 days total   If discharge before completion treatment, I would complete HQ course at home but discontinue azithromycin at discharge  4  DM, somewhat poorly controlled, with elevated he might 1 C  Blood sugar has been well controlled here  Discussed with patient in detail regarding the above plan  Discussed with Dr Delma Ivey from [facility] service  If patient continues to do well clinically, can anticipate discharge in 1-2 days  I spent 90 minutes on the unit floor, of which 45 minutes were spent in counseling and coordination of care, as outlined above  Thank you for this consultation    We will follow along with you  Antibiotics:   Hydroxychloroquine/azithromycin # 3    HISTORY OF PRESENT ILLNESS:  Reason for Consult:  COVID-19 pneumonia  HPI: Karen Nicholas is a 53 y o  female, with history of DM, came to the ER on 04/01 with 1 week history of dyspnea with cough, followed by fever with chills  She also complained of myalgia  On presentation, patient did not have fever but had fever shortly after admission and had normal WBC  However, CXR showed bilateral patchy infiltrates  Patient was admitted and started on hydroxychloroquine/azithromycin for presumptive COVID-19  Patient had a COVID PCR done on 03/27 which was still pending on admission  Another COVID PCR was repeated on admission  This came back positive  For these reasons, we are asked to evaluate the patient  At present, patient is anxious due to her COVID diagnosis  However, she states she feels better  She did not have fever on presentation but developed fever later that evening  She continues to have intermittent low-grade fever but no further chills  Dyspnea is much improved  Patient is not on O2 at present  She is able to ambulate to bathroom without too much dyspnea  Cough is also improved, mostly nonproductive  Patient works at [employer]  Patient states that on 03/25, company notify employee stat there was 1 employee who tested positive for COVID  Patient started developing above symptoms right after the announced month  Of note, on the dated patient came to ER, her  started having low-grade fever with chills and dyspnea/cough also  He is stable, not as ill as she was  REVIEW OF SYSTEMS:  A complete system-based review was done  Except for what is noted in HPI above, ROS of systems is otherwise negative      PAST MEDICAL HISTORY:  Past Medical History:   Diagnosis Date    Cancer [facility])     breast    Diabetes mellitus ([facility] [address] )     Hypertension      Past Surgical History:   Procedure Laterality Date    DILATION AND CURETTAGE OF UTERUS      LYMPHADENECTOMY       Problem list reviewed  FAMILY HISTORY:  Non-contributory    SOCIAL HISTORY:  Social History     Substance and Sexual Activity   Alcohol Use Yes    Comment: socially     Social History     Substance and Sexual Activity   Drug Use No     Social History     Tobacco Use   Smoking Status Never Smoker   Smokeless Tobacco Never Used       ALLERGIES:  Allergies   Allergen Reactions    Dapagliflozin      Facial swelling       MEDICATIONS:  All current active medications have been reviewed  Patient is currently on hydroxychloroquine/azithromycin  PHYSICAL EXAM:  Vitals:  Temp:  [98 1 °F (36 7 °C)-100 6 °F (38 1 °C)] 98 1 °F (36 7 °C)  HR:  [] 96  Resp:  [18-22] 18  BP: (100-118)/(60-70) 118/70  SpO2:  [95 %-97 %] 97 %  Temp (24hrs), Av 8 °F (37 1 °C), Min:98 1 °F (36 7 °C), Max:100 6 °F (38 1 °C)  Current: Temperature: 98 1 °F (36 7 °C)     Physical Exam:    Parts of the exam were were done by the Medicine service and discussed with me, due to the infection control issues related to the current COVID crisis  General:  Well-nourished, well-developed, in no acute distress  Awake, alert and oriented x 3  Eyes:  Conjunctive clear with no hemorrhages or effusions  Oropharynx:  No ulcers, no lesions, pharynx benign, no tonsillitis  Neck:  Supple, no lymphadenopathy, no mass, nontender  Lungs:  Expansion symmetric, no rales, no wheezing, no accessory muscle use  Cardiac:  Regular rate and rhythm, normal S1, normal S2, no murmurs  Abdomen:  Soft, nondistended, non-tender, no HSM  Extremities:  No edema, no erythema, nontender  No ulcers  Skin:  No rashes, no ulcers  Neurological:  Moves all four extremities spontaneously, sensation grossly intact       LABS, IMAGING, & OTHER STUDIES:  Lab Results:  I have personally reviewed pertinent labs    Results from last 7 days   Lab Units 20  0619 20  0455 20  0858   POTASSIUM mmol/L 3 8 3 9 3 6 CHLORIDE mmol/L 102 100 99*   CO2 mmol/L 25 27 25   BUN mg/dL 8 6 7   CREATININE mg/dL 0 47* 0 51* 0 54*   EGFR ml/min/1 73sq m 116 113 111   CALCIUM mg/dL 9 3 9 2 8 9   AST U/L 22 20 22   ALT U/L 24 22 25   ALK PHOS U/L 69 67 65     Results from last 7 days   Lab Units 04/03/20  0619 04/02/20  0455 04/01/20  0858   WBC Thousand/uL 4 64 5 32 5 31   HEMOGLOBIN g/dL 11 2* 11 5 11 2*   PLATELETS Thousands/uL 366 325 255     Results from last 7 days   Lab Units 04/01/20  1750 04/01/20  0900 04/01/20  0858   BLOOD CULTURE   --  No Growth at 48 hrs  No Growth at 48 hrs  LEGIONELLA URINARY ANTIGEN  Negative  --   --        Imaging Studies:   I have personally reviewed pertinent imaging study reports and images in PACS  CXR reviewed personally  Bilateral patchy opacities  EKG, Pathology, and Other Studies:   I have personally reviewed pertinent reports

## 2020-04-03 NOTE — ASSESSMENT & PLAN NOTE
Infectious disease evaluation appreciated  Started on azithromycin also  Patient on hydroxychloroquine  Monitor QTC

## 2020-04-04 LAB
ALBUMIN SERPL BCP-MCNC: 2.7 G/DL (ref 3.5–5)
ALP SERPL-CCNC: 67 U/L (ref 46–116)
ALT SERPL W P-5'-P-CCNC: 22 U/L (ref 12–78)
ANION GAP SERPL CALCULATED.3IONS-SCNC: 10 MMOL/L (ref 4–13)
AST SERPL W P-5'-P-CCNC: 18 U/L (ref 5–45)
ATRIAL RATE: 100 BPM
ATRIAL RATE: 108 BPM
ATRIAL RATE: 86 BPM
ATRIAL RATE: 86 BPM
ATRIAL RATE: 98 BPM
BILIRUB SERPL-MCNC: 0.3 MG/DL (ref 0.2–1)
BUN SERPL-MCNC: 8 MG/DL (ref 5–25)
CALCIUM SERPL-MCNC: 8.9 MG/DL (ref 8.3–10.1)
CHLORIDE SERPL-SCNC: 104 MMOL/L (ref 100–108)
CO2 SERPL-SCNC: 25 MMOL/L (ref 21–32)
CREAT SERPL-MCNC: 0.45 MG/DL (ref 0.6–1.3)
ERYTHROCYTE [DISTWIDTH] IN BLOOD BY AUTOMATED COUNT: 16.2 % (ref 11.6–15.1)
GFR SERPL CREATININE-BSD FRML MDRD: 118 ML/MIN/1.73SQ M
GLUCOSE SERPL-MCNC: 127 MG/DL (ref 65–140)
GLUCOSE SERPL-MCNC: 129 MG/DL (ref 65–140)
GLUCOSE SERPL-MCNC: 133 MG/DL (ref 65–140)
GLUCOSE SERPL-MCNC: 187 MG/DL (ref 65–140)
GLUCOSE SERPL-MCNC: 188 MG/DL (ref 65–140)
GLUCOSE SERPL-MCNC: 223 MG/DL (ref 65–140)
HCT VFR BLD AUTO: 34.5 % (ref 34.8–46.1)
HGB BLD-MCNC: 10.5 G/DL (ref 11.5–15.4)
MCH RBC QN AUTO: 24 PG (ref 26.8–34.3)
MCHC RBC AUTO-ENTMCNC: 30.4 G/DL (ref 31.4–37.4)
MCV RBC AUTO: 79 FL (ref 82–98)
NRBC BLD AUTO-RTO: 0 /100 WBCS
P AXIS: 49 DEGREES
P AXIS: 58 DEGREES
P AXIS: 63 DEGREES
P AXIS: 72 DEGREES
P AXIS: 75 DEGREES
PLATELET # BLD AUTO: 399 THOUSANDS/UL (ref 149–390)
PMV BLD AUTO: 8.6 FL (ref 8.9–12.7)
POTASSIUM SERPL-SCNC: 4.1 MMOL/L (ref 3.5–5.3)
PR INTERVAL: 150 MS
PR INTERVAL: 166 MS
PR INTERVAL: 174 MS
PR INTERVAL: 176 MS
PR INTERVAL: 194 MS
PROT SERPL-MCNC: 7 G/DL (ref 6.4–8.2)
QRS AXIS: 56 DEGREES
QRS AXIS: 58 DEGREES
QRS AXIS: 63 DEGREES
QRS AXIS: 63 DEGREES
QRS AXIS: 73 DEGREES
QRSD INTERVAL: 100 MS
QRSD INTERVAL: 100 MS
QRSD INTERVAL: 106 MS
QRSD INTERVAL: 108 MS
QRSD INTERVAL: 96 MS
QT INTERVAL: 316 MS
QT INTERVAL: 340 MS
QT INTERVAL: 346 MS
QT INTERVAL: 366 MS
QT INTERVAL: 370 MS
QTC INTERVAL: 423 MS
QTC INTERVAL: 437 MS
QTC INTERVAL: 438 MS
QTC INTERVAL: 441 MS
QTC INTERVAL: 442 MS
RBC # BLD AUTO: 4.38 MILLION/UL (ref 3.81–5.12)
SODIUM SERPL-SCNC: 139 MMOL/L (ref 136–145)
T WAVE AXIS: 33 DEGREES
T WAVE AXIS: 41 DEGREES
T WAVE AXIS: 56 DEGREES
T WAVE AXIS: 58 DEGREES
T WAVE AXIS: 59 DEGREES
VENTRICULAR RATE: 100 BPM
VENTRICULAR RATE: 108 BPM
VENTRICULAR RATE: 86 BPM
VENTRICULAR RATE: 86 BPM
VENTRICULAR RATE: 98 BPM
WBC # BLD AUTO: 5.01 THOUSAND/UL (ref 4.31–10.16)

## 2020-04-04 PROCEDURE — 99232 SBSQ HOSP IP/OBS MODERATE 35: CPT | Performed by: INTERNAL MEDICINE

## 2020-04-04 PROCEDURE — 85027 COMPLETE CBC AUTOMATED: CPT | Performed by: INTERNAL MEDICINE

## 2020-04-04 PROCEDURE — 93010 ELECTROCARDIOGRAM REPORT: CPT | Performed by: INTERNAL MEDICINE

## 2020-04-04 PROCEDURE — 93005 ELECTROCARDIOGRAM TRACING: CPT

## 2020-04-04 PROCEDURE — 80053 COMPREHEN METABOLIC PANEL: CPT | Performed by: INTERNAL MEDICINE

## 2020-04-04 PROCEDURE — 82948 REAGENT STRIP/BLOOD GLUCOSE: CPT

## 2020-04-04 RX ADMIN — INSULIN LISPRO 1 UNITS: 100 INJECTION, SOLUTION INTRAVENOUS; SUBCUTANEOUS at 21:46

## 2020-04-04 RX ADMIN — ENOXAPARIN SODIUM 40 MG: 40 INJECTION SUBCUTANEOUS at 08:31

## 2020-04-04 RX ADMIN — HYDROXYCHLOROQUINE SULFATE 200 MG: 200 TABLET, FILM COATED ORAL at 17:59

## 2020-04-04 RX ADMIN — LOSARTAN POTASSIUM 25 MG: 25 TABLET, FILM COATED ORAL at 08:31

## 2020-04-04 RX ADMIN — OXYCODONE HYDROCHLORIDE AND ACETAMINOPHEN 500 MG: 500 TABLET ORAL at 17:59

## 2020-04-04 RX ADMIN — HYDROXYCHLOROQUINE SULFATE 200 MG: 200 TABLET, FILM COATED ORAL at 08:31

## 2020-04-04 RX ADMIN — OXYCODONE HYDROCHLORIDE AND ACETAMINOPHEN 500 MG: 500 TABLET ORAL at 08:40

## 2020-04-04 RX ADMIN — INSULIN LISPRO 1 UNITS: 100 INJECTION, SOLUTION INTRAVENOUS; SUBCUTANEOUS at 11:38

## 2020-04-04 RX ADMIN — ATORVASTATIN CALCIUM 20 MG: 20 TABLET, FILM COATED ORAL at 08:31

## 2020-04-04 RX ADMIN — AZITHROMYCIN 250 MG: 250 TABLET, FILM COATED ORAL at 13:41

## 2020-04-04 RX ADMIN — GLIMEPIRIDE 2 MG: 2 TABLET ORAL at 08:40

## 2020-04-04 NOTE — ASSESSMENT & PLAN NOTE
Patient came with shortness of breath, cough and fever  In ER she was tachycardic and tachypneic  Saturating well on room air  Sirs secondary to most likely viral infection  COVID 19 positive  Airborne and contact isolation  Continue azithromycin and hydroxychloroquine for 5 days    Influenza and RSV negative    Procalcitonin less than 0 05  Lactic acid normal   Blood culture no growth  Infectious disease on board  If remains stable patient can be discharged most likely tomorrow

## 2020-04-04 NOTE — ASSESSMENT & PLAN NOTE
Infectious disease evaluation appreciated  Patient on hydroxychloroquine and azithromycin    If she remains stable will be discharged home tomorrow  Finish 5 days course of hydroxychloroquine    Will stop azithromycin

## 2020-04-04 NOTE — PROGRESS NOTES
Progress Note - Alejoguy Mensah 1970, 53 y o  female MRN: [id number]    Unit/Bed#: -01 Encounter: [id number]    Primary Care Provider: Luna Harrison MD   Date and time admitted to hospital: 4/1/2020  8:41 AM        COVID-19 virus detected  Assessment & Plan  Infectious disease evaluation appreciated  Patient on hydroxychloroquine and azithromycin    If she remains stable will be discharged home tomorrow  Finish 5 days course of hydroxychloroquine  Will stop azithromycin      Type 2 diabetes mellitus, without long-term current use of insulin [facility])  Assessment & Plan  Lab Results   Component Value Date    HGBA1C 7 7 (H) 04/02/2020       Recent Labs     04/03/20  1709 04/03/20  2109 04/04/20  0838 04/04/20  1136   POCGLU 237* 144* 127 187*       Blood Sugar Average: Last 72 hrs:  (P) 142 5     Continue glimepiride  Hold metformin  Sliding scale  Viral respiratory infection  Assessment & Plan  COVID 19 positive  Monitor pulse ox frequently  Hyperlipidemia  Assessment & Plan  Continue statin    Hypertension  Assessment & Plan  Continue home medications  Monitor closely    * SIRS (systemic inflammatory response syndrome) ([facility] [address] )  Assessment & Plan  Patient came with shortness of breath, cough and fever  In ER she was tachycardic and tachypneic  Saturating well on room air  Sirs secondary to most likely viral infection  COVID 19 positive  Airborne and contact isolation  Continue azithromycin and hydroxychloroquine for 5 days    Influenza and RSV negative  Procalcitonin less than 0 05  Lactic acid normal   Blood culture no growth  Infectious disease on board  If remains stable patient can be discharged most likely tomorrow        VTE Pharmacologic Prophylaxis:   Pharmacologic: Enoxaparin (Lovenox)  Mechanical VTE Prophylaxis in Place: Yes    Patient Centered Rounds: I have performed bedside rounds with nursing staff today      Discussions with Specialists or Other Care Team Provider:     Education and Discussions with Family / Patient:     Time Spent for Care: More than 50% of total time spent on counseling and coordination of care as described above  Current Length of Stay: 3 day(s)    Current Patient Status: Inpatient   Certification Statement: The patient will continue to require additional inpatient hospital stay due to covid    Discharge Plan:  In next 24 hours    Code Status: Level 1 - Full Code      Subjective:   I have seen the patient this morning  Patient feels much better  Still she complains about cough and myalgia  Afebrile overnight    Objective:     Vitals:   Temp (24hrs), Av 1 °F (36 7 °C), Min:98 °F (36 7 °C), Max:98 2 °F (36 8 °C)    Temp:  [98 °F (36 7 °C)-98 2 °F (36 8 °C)] 98 °F (36 7 °C)  HR:  [83-98] 83  Resp:  [16-20] 20  BP: (118-120)/(69-76) 118/69  SpO2:  [95 %-100 %] 95 %  Body mass index is 20 09 kg/m²  Input and Output Summary (last 24 hours):        Intake/Output Summary (Last 24 hours) at 2020 1436  Last data filed at 4/3/2020 1757  Gross per 24 hour   Intake 250 ml   Output --   Net 250 ml       Physical Exam:     Physical Exam     Limited due to covid    General- Awake, alert and oriented x 3, looks comfortable  Respiratory system- breathing on room air, comfortable, not using accessory muscle  Psych- No acute psychosis  CNS- moving all extremities    Additional Data:     Labs:    Results from last 7 days   Lab Units 20  0708 20  0619   WBC Thousand/uL 5 01 4 64   HEMOGLOBIN g/dL 10 5* 11 2*   HEMATOCRIT % 34 5* 37 2   PLATELETS Thousands/uL 399* 366   NEUTROS PCT %  --  71   LYMPHS PCT %  --  21   MONOS PCT %  --  8   EOS PCT %  --  0     Results from last 7 days   Lab Units 20  0708   SODIUM mmol/L 139   POTASSIUM mmol/L 4 1   CHLORIDE mmol/L 104   CO2 mmol/L 25   BUN mg/dL 8   CREATININE mg/dL 0 45*   ANION GAP mmol/L 10   CALCIUM mg/dL 8 9   ALBUMIN g/dL 2 7*   TOTAL BILIRUBIN mg/dL 0 30   ALK PHOS U/L 67 ALT U/L 22   AST U/L 18   GLUCOSE RANDOM mg/dL 129     Results from last 7 days   Lab Units 04/01/20  0858   INR  0 93     Results from last 7 days   Lab Units 04/04/20  1136 04/04/20  0838 04/03/20  2109 04/03/20  1709 04/03/20  1212 04/03/20  0855 04/02/20  2104 04/02/20  1542 04/02/20  1150 04/01/20  2053 04/01/20  1644 04/01/20  1307   POC GLUCOSE mg/dl 187* 127 144* 237* 246* 127 107 98 102 122 127 86     Results from last 7 days   Lab Units 04/02/20  0455   HEMOGLOBIN A1C % 7 7*     Results from last 7 days   Lab Units 04/02/20  0455 04/01/20  0858   LACTIC ACID mmol/L  --  1 3   PROCALCITONIN ng/ml <0 05 <0 05           * I Have Reviewed All Lab Data Listed Above  * Additional Pertinent Lab Tests Reviewed: All Labs Within Last 24 Hours Reviewed    Imaging:    Imaging Reports Reviewed Today Include:  Imaging Personally Reviewed by Myself Includes:      Recent Cultures (last 7 days):     Results from last 7 days   Lab Units 04/01/20  1750 04/01/20  0900 04/01/20  0858   BLOOD CULTURE   --  No Growth at 72 hrs  No Growth at 72 hrs     LEGIONELLA URINARY ANTIGEN  Negative  --   --        Last 24 Hours Medication List:     Current Facility-Administered Medications:  acetaminophen 650 mg Oral Q6H PRN Katie Corona MD   ascorbic acid 500 mg Oral BID Katie Corona MD   atorvastatin 20 mg Oral Daily Katie Corona MD   azithromycin 250 mg Oral Q24H Ayde Lawson MD   dextromethorphan-guaiFENesin 10 mL Oral Q4H PRN Katie Corona MD   enoxaparin 40 mg Subcutaneous Q24H [address] Katie Corona MD   glimepiride 2 mg Oral Daily With Breakfast Katie Corona MD   hydroxychloroquine 200 mg Oral BID Katie Corona MD   insulin lispro 1-6 Units Subcutaneous TID Suzie Carbajal MD   insulin lispro 1-6 Units Subcutaneous HS Katie Corona MD   losartan 25 mg Oral Daily Katie Corona MD        Today, Patient Was Seen By: Nima Lubin MD    ** Please Note: Dictation voice to text software may have been used in the creation of this document   **

## 2020-04-04 NOTE — ASSESSMENT & PLAN NOTE
Lab Results   Component Value Date    HGBA1C 7 7 (H) 04/02/2020       Recent Labs     04/03/20  1709 04/03/20  2109 04/04/20  0838 04/04/20  1136   POCGLU 237* 144* 127 187*       Blood Sugar Average: Last 72 hrs:  (P) 142 5     Continue glimepiride  Hold metformin  Sliding scale

## 2020-04-05 LAB
ALBUMIN SERPL BCP-MCNC: 2.9 G/DL (ref 3.5–5)
ALP SERPL-CCNC: 77 U/L (ref 46–116)
ALT SERPL W P-5'-P-CCNC: 29 U/L (ref 12–78)
ANION GAP SERPL CALCULATED.3IONS-SCNC: 11 MMOL/L (ref 4–13)
AST SERPL W P-5'-P-CCNC: 17 U/L (ref 5–45)
ATRIAL RATE: 94 BPM
BASOPHILS # BLD AUTO: 0.01 THOUSANDS/ΜL (ref 0–0.1)
BASOPHILS NFR BLD AUTO: 0 % (ref 0–1)
BILIRUB SERPL-MCNC: 0.3 MG/DL (ref 0.2–1)
BUN SERPL-MCNC: 9 MG/DL (ref 5–25)
CALCIUM SERPL-MCNC: 9.1 MG/DL (ref 8.3–10.1)
CHLORIDE SERPL-SCNC: 104 MMOL/L (ref 100–108)
CO2 SERPL-SCNC: 24 MMOL/L (ref 21–32)
CREAT SERPL-MCNC: 0.38 MG/DL (ref 0.6–1.3)
CRP SERPL QL: 27.8 MG/L
EOSINOPHIL # BLD AUTO: 0.05 THOUSAND/ΜL (ref 0–0.61)
EOSINOPHIL NFR BLD AUTO: 1 % (ref 0–6)
ERYTHROCYTE [DISTWIDTH] IN BLOOD BY AUTOMATED COUNT: 16 % (ref 11.6–15.1)
GFR SERPL CREATININE-BSD FRML MDRD: 125 ML/MIN/1.73SQ M
GLUCOSE SERPL-MCNC: 107 MG/DL (ref 65–140)
GLUCOSE SERPL-MCNC: 126 MG/DL (ref 65–140)
GLUCOSE SERPL-MCNC: 135 MG/DL (ref 65–140)
GLUCOSE SERPL-MCNC: 142 MG/DL (ref 65–140)
GLUCOSE SERPL-MCNC: 150 MG/DL (ref 65–140)
HCT VFR BLD AUTO: 33.7 % (ref 34.8–46.1)
HGB BLD-MCNC: 10.3 G/DL (ref 11.5–15.4)
IMM GRANULOCYTES # BLD AUTO: 0.03 THOUSAND/UL (ref 0–0.2)
IMM GRANULOCYTES NFR BLD AUTO: 1 % (ref 0–2)
LYMPHOCYTES # BLD AUTO: 1.1 THOUSANDS/ΜL (ref 0.6–4.47)
LYMPHOCYTES NFR BLD AUTO: 21 % (ref 14–44)
MCH RBC QN AUTO: 24.2 PG (ref 26.8–34.3)
MCHC RBC AUTO-ENTMCNC: 30.6 G/DL (ref 31.4–37.4)
MCV RBC AUTO: 79 FL (ref 82–98)
MONOCYTES # BLD AUTO: 0.51 THOUSAND/ΜL (ref 0.17–1.22)
MONOCYTES NFR BLD AUTO: 10 % (ref 4–12)
NEUTROPHILS # BLD AUTO: 3.57 THOUSANDS/ΜL (ref 1.85–7.62)
NEUTS SEG NFR BLD AUTO: 67 % (ref 43–75)
NRBC BLD AUTO-RTO: 0 /100 WBCS
P AXIS: 64 DEGREES
PLATELET # BLD AUTO: 488 THOUSANDS/UL (ref 149–390)
PMV BLD AUTO: 8.8 FL (ref 8.9–12.7)
POTASSIUM SERPL-SCNC: 3.9 MMOL/L (ref 3.5–5.3)
PR INTERVAL: 182 MS
PROT SERPL-MCNC: 7.1 G/DL (ref 6.4–8.2)
QRS AXIS: 59 DEGREES
QRSD INTERVAL: 106 MS
QT INTERVAL: 356 MS
QTC INTERVAL: 445 MS
RBC # BLD AUTO: 4.25 MILLION/UL (ref 3.81–5.12)
SODIUM SERPL-SCNC: 139 MMOL/L (ref 136–145)
T WAVE AXIS: 48 DEGREES
VENTRICULAR RATE: 94 BPM
WBC # BLD AUTO: 5.27 THOUSAND/UL (ref 4.31–10.16)

## 2020-04-05 PROCEDURE — 86140 C-REACTIVE PROTEIN: CPT | Performed by: INTERNAL MEDICINE

## 2020-04-05 PROCEDURE — 93005 ELECTROCARDIOGRAM TRACING: CPT

## 2020-04-05 PROCEDURE — 80053 COMPREHEN METABOLIC PANEL: CPT | Performed by: INTERNAL MEDICINE

## 2020-04-05 PROCEDURE — 85025 COMPLETE CBC W/AUTO DIFF WBC: CPT | Performed by: INTERNAL MEDICINE

## 2020-04-05 PROCEDURE — 82948 REAGENT STRIP/BLOOD GLUCOSE: CPT

## 2020-04-05 PROCEDURE — 93010 ELECTROCARDIOGRAM REPORT: CPT | Performed by: INTERNAL MEDICINE

## 2020-04-05 PROCEDURE — 99232 SBSQ HOSP IP/OBS MODERATE 35: CPT | Performed by: INTERNAL MEDICINE

## 2020-04-05 RX ORDER — ACETAMINOPHEN 325 MG/1
325 TABLET ORAL ONCE
Status: COMPLETED | OUTPATIENT
Start: 2020-04-05 | End: 2020-04-05

## 2020-04-05 RX ADMIN — GLIMEPIRIDE 2 MG: 2 TABLET ORAL at 08:26

## 2020-04-05 RX ADMIN — OXYCODONE HYDROCHLORIDE AND ACETAMINOPHEN 500 MG: 500 TABLET ORAL at 08:26

## 2020-04-05 RX ADMIN — ACETAMINOPHEN 325 MG: 325 TABLET ORAL at 12:03

## 2020-04-05 RX ADMIN — ENOXAPARIN SODIUM 40 MG: 40 INJECTION SUBCUTANEOUS at 08:26

## 2020-04-05 RX ADMIN — AZITHROMYCIN 250 MG: 250 TABLET, FILM COATED ORAL at 12:03

## 2020-04-05 RX ADMIN — ACETAMINOPHEN 650 MG: 325 TABLET ORAL at 08:26

## 2020-04-05 RX ADMIN — HYDROXYCHLOROQUINE SULFATE 200 MG: 200 TABLET, FILM COATED ORAL at 17:17

## 2020-04-05 RX ADMIN — OXYCODONE HYDROCHLORIDE AND ACETAMINOPHEN 500 MG: 500 TABLET ORAL at 17:17

## 2020-04-05 RX ADMIN — ACETAMINOPHEN 650 MG: 325 TABLET ORAL at 14:49

## 2020-04-05 RX ADMIN — HYDROXYCHLOROQUINE SULFATE 200 MG: 200 TABLET, FILM COATED ORAL at 08:26

## 2020-04-05 RX ADMIN — ATORVASTATIN CALCIUM 20 MG: 20 TABLET, FILM COATED ORAL at 08:26

## 2020-04-05 NOTE — ASSESSMENT & PLAN NOTE
Lab Results   Component Value Date    HGBA1C 7 7 (H) 04/02/2020       Recent Labs     04/04/20  2100 04/04/20  2141 04/05/20  0529 04/05/20  1045   POCGLU 223* 188* 135 142*       Blood Sugar Average: Last 72 hrs:  (P) 156 [id number]     Continue glimepiride  Hold metformin  Sliding scale

## 2020-04-05 NOTE — ASSESSMENT & PLAN NOTE
Patient came with shortness of breath, cough and fever  In ER she was tachycardic and tachypneic  Saturating well on room air  Sirs secondary to most likely viral infection  COVID 19 positive  Airborne and contact isolation  Continue azithromycin and hydroxychloroquine for 5 days    Influenza and RSV negative  Procalcitonin less than 0 05  Lactic acid normal   Blood culture no growth  Infectious disease on board  Her inflammatory markers coming down but still significantly high    CRP 27 8 today

## 2020-04-05 NOTE — ASSESSMENT & PLAN NOTE
Infectious disease evaluation appreciated  Patient on hydroxychloroquine and azithromycin    Her inflammatory marker still significantly high  CRP 27 8   Patient also feeling very tired today    Continue to monitor

## 2020-04-05 NOTE — PROGRESS NOTES
Progress Note - Claudia Cabral 1970, 53 y o  female MRN: [id number]    Unit/Bed#: -01 Encounter: [id number]    Primary Care Provider: Rolo Rodriguez MD   Date and time admitted to hospital: 4/1/2020  8:41 AM        COVID-19 virus detected  Assessment & Plan  Infectious disease evaluation appreciated  Patient on hydroxychloroquine and azithromycin    Her inflammatory marker still significantly high  CRP 27 8   Patient also feeling very tired today  Continue to monitor    Type 2 diabetes mellitus, without long-term current use of insulin [facility])  Assessment & Plan  Lab Results   Component Value Date    HGBA1C 7 7 (H) 04/02/2020       Recent Labs     04/04/20  2100 04/04/20  2141 04/05/20  0529 04/05/20  1045   POCGLU 223* 188* 135 142*       Blood Sugar Average: Last 72 hrs:  (P) 156 [id number]     Continue glimepiride  Hold metformin  Sliding scale  Viral respiratory infection  Assessment & Plan  COVID 19 positive  Monitor pulse ox frequently  Hyperlipidemia  Assessment & Plan  Continue statin    Hypertension  Assessment & Plan  Continue home medications  Monitor closely    * SIRS (systemic inflammatory response syndrome) ([facility] [address] )  Assessment & Plan  Patient came with shortness of breath, cough and fever  In ER she was tachycardic and tachypneic  Saturating well on room air  Sirs secondary to most likely viral infection  COVID 19 positive  Airborne and contact isolation  Continue azithromycin and hydroxychloroquine for 5 days    Influenza and RSV negative  Procalcitonin less than 0 05  Lactic acid normal   Blood culture no growth  Infectious disease on board  Her inflammatory markers coming down but still significantly high  CRP 27 8 today        VTE Pharmacologic Prophylaxis:   Pharmacologic: Enoxaparin (Lovenox)  Mechanical VTE Prophylaxis in Place: Yes    Patient Centered Rounds: I have performed bedside rounds with nursing staff today      Discussions with Specialists or Other Care Team Provider:     Education and Discussions with Family / Patient:     Time Spent for Care: More than 50% of total time spent on counseling and coordination of care as described above  Current Length of Stay: 4 day(s)    Current Patient Status: Inpatient   Certification Statement: The patient will continue to require additional inpatient hospital stay due to covid    Discharge Plan:  In next 24 hours    Code Status: Level 1 - Full Code      Subjective:   I have seen the patient bedside this morning  Patient complaining about headache since this morning  No weakness, numbness or tingling  She also feels very tired  Remains on room air  Objective:     Vitals:   Temp (24hrs), Av 6 °F (37 °C), Min:98 3 °F (36 8 °C), Max:99 °F (37 2 °C)    Temp:  [98 3 °F (36 8 °C)-99 °F (37 2 °C)] 98 7 °F (37 1 °C)  HR:  [] 92  Resp:  [18-20] 18  BP: ()/(54-87) 91/54  SpO2:  [91 %-99 %] 97 %  Body mass index is 20 09 kg/m²  Input and Output Summary (last 24 hours): Intake/Output Summary (Last 24 hours) at 2020 1413  Last data filed at 2020 2149  Gross per 24 hour   Intake 250 ml   Output 0 ml   Net 250 ml       Physical Exam:     Physical Exam  Limited due to pandemic COVID-19 infection      General- Awake, alert and oriented x 3, looks comfortable  Respiratory system- breathing on room air, not using any accessory muscles  Psych- No acute psychosis  CNS- moving all extremities    Additional Data:     Labs:    Results from last 7 days   Lab Units 20  0525   WBC Thousand/uL 5 27   HEMOGLOBIN g/dL 10 3*   HEMATOCRIT % 33 7*   PLATELETS Thousands/uL 488*   NEUTROS PCT % 67   LYMPHS PCT % 21   MONOS PCT % 10   EOS PCT % 1     Results from last 7 days   Lab Units 20  0525   SODIUM mmol/L 139   POTASSIUM mmol/L 3 9   CHLORIDE mmol/L 104   CO2 mmol/L 24   BUN mg/dL 9   CREATININE mg/dL 0 38*   ANION GAP mmol/L 11   CALCIUM mg/dL 9 1   ALBUMIN g/dL 2 9*   TOTAL BILIRUBIN mg/dL 0 30   ALK PHOS U/L 77   ALT U/L 29   AST U/L 17   GLUCOSE RANDOM mg/dL 126     Results from last 7 days   Lab Units 04/01/20  0858   INR  0 93     Results from last 7 days   Lab Units 04/05/20  1045 04/05/20  0529 04/04/20  2141 04/04/20  2100 04/04/20  1707 04/04/20  1136 04/04/20  2781 04/03/20  2109 04/03/20  1709 04/03/20  1212 04/03/20  0855 04/02/20  2104   POC GLUCOSE mg/dl 142* 135 188* 223* 133 187* 127 144* 237* 246* 127 107     Results from last 7 days   Lab Units 04/02/20  0455   HEMOGLOBIN A1C % 7 7*     Results from last 7 days   Lab Units 04/02/20  0455 04/01/20  0858   LACTIC ACID mmol/L  --  1 3   PROCALCITONIN ng/ml <0 05 <0 05           * I Have Reviewed All Lab Data Listed Above  * Additional Pertinent Lab Tests Reviewed: All Labs Within Last 24 Hours Reviewed    Imaging:    Imaging Reports Reviewed Today Include:   Imaging Personally Reviewed by Myself Includes:      Recent Cultures (last 7 days):     Results from last 7 days   Lab Units 04/01/20  1750 04/01/20  0900 04/01/20  0858   BLOOD CULTURE   --  No Growth After 4 Days  No Growth After 4 Days     LEGIONELLA URINARY ANTIGEN  Negative  --   --        Last 24 Hours Medication List:     Current Facility-Administered Medications:  acetaminophen 650 mg Oral Q6H PRN Jaki Granado MD   ascorbic acid 500 mg Oral BID Jaki Granado MD   atorvastatin 20 mg Oral Daily Jaki Granado MD   azithromycin 250 mg Oral Q24H Cherrie Byrd MD   dextromethorphan-guaiFENesin 10 mL Oral Q4H PRN Jaki Granado MD   enoxaparin 40 mg Subcutaneous Q24H Chantel Carmona MD   glimepiride 2 mg Oral Daily With Breakfast Jaki Granado MD   hydroxychloroquine 200 mg Oral BID Jaki Granado MD   insulin lispro 1-6 Units Subcutaneous TID Nabila Chu MD   insulin lispro 1-6 Units Subcutaneous HS Jaki Granado MD   losartan 25 mg Oral Daily Jaki Granado MD        Today, Patient Was Seen By: Aparna Yousif MD    ** Please Note: Dictation voice to text software may have been used in the creation of this document   **

## 2020-04-05 NOTE — PLAN OF CARE
Problem: Potential for Falls  Goal: Patient will remain free of falls  Description  INTERVENTIONS:  - Assess patient frequently for physical needs  -  Identify cognitive and physical deficits and behaviors that affect risk of falls    -  [place] fall precautions as indicated by assessment   - Educate patient/family on patient safety including physical limitations  - Instruct patient to call for assistance with activity based on assessment  - Modify environment to reduce risk of injury  - Consider OT/PT consult to assist with strengthening/mobility  Outcome: Progressing     Problem: PAIN - ADULT  Goal: Verbalizes/displays adequate comfort level or baseline comfort level  Description  Interventions:  - Encourage patient to monitor pain and request assistance  - Assess pain using appropriate pain scale  - Administer analgesics based on type and severity of pain and evaluate response  - Implement non-pharmacological measures as appropriate and evaluate response  - Consider cultural and social influences on pain and pain management  - Notify physician/advanced practitioner if interventions unsuccessful or patient reports new pain  Outcome: Progressing     Problem: INFECTION - ADULT  Goal: Absence or prevention of progression during hospitalization  Description  INTERVENTIONS:  - Assess and monitor for signs and symptoms of infection  - Monitor lab/diagnostic results  - Monitor all insertion sites, i e  indwelling lines, tubes, and drains  - Monitor endotracheal if appropriate and nasal secretions for changes in amount and color  - [place] appropriate cooling/warming therapies per order  - Administer medications as ordered  - Instruct and encourage patient and family to use good hand hygiene technique  - Identify and instruct in appropriate isolation precautions for identified infection/condition  Outcome: Progressing  Goal: Absence of fever/infection during neutropenic period  Description  INTERVENTIONS:  - Monitor WBC    Outcome: Progressing     Problem: SAFETY ADULT  Goal: Maintain or return to baseline ADL function  Description  INTERVENTIONS:  -  Assess patient's ability to carry out ADLs; assess patient's baseline for ADL function and identify physical deficits which impact ability to perform ADLs (bathing, care of mouth/teeth, toileting, grooming, dressing, etc )  - Assess/evaluate cause of self-care deficits   - Assess range of motion  - Assess patient's mobility; develop plan if impaired  - Assess patient's need for assistive devices and provide as appropriate  - Encourage maximum independence but intervene and supervise when necessary  - Involve family in performance of ADLs  - Assess for home care needs following discharge   - Consider OT consult to assist with ADL evaluation and planning for discharge  - Provide patient education as appropriate  Outcome: Progressing  Goal: Maintain or return mobility status to optimal level  Description  INTERVENTIONS:  - Assess patient's baseline mobility status (ambulation, transfers, stairs, etc )    - Identify cognitive and physical deficits and behaviors that affect mobility  - Identify mobility aids required to assist with transfers and/or ambulation (gait belt, sit-to-stand, lift, walker, cane, etc )  - [place] fall precautions as indicated by assessment  - Record patient progress and toleration of activity level on Mobility SBAR; progress patient to next Phase/Stage  - Instruct patient to call for assistance with activity based on assessment  - Consider rehabilitation consult to assist with strengthening/weightbearing, etc   Outcome: Progressing     Problem: DISCHARGE PLANNING  Goal: Discharge to home or other facility with appropriate resources  Description  INTERVENTIONS:  - Identify barriers to discharge w/patient and caregiver  - Arrange for needed discharge resources and transportation as appropriate  - Identify discharge learning needs (meds, wound care, etc )  - Arrange for interpretive services to assist at discharge as needed  - Refer to Case Management Department for coordinating discharge planning if the patient needs post-hospital services based on physician/advanced practitioner order or complex needs related to functional status, cognitive ability, or social support system  Outcome: Progressing     Problem: Knowledge Deficit  Goal: Patient/family/caregiver demonstrates understanding of disease process, treatment plan, medications, and discharge instructions  Description  Complete learning assessment and assess knowledge base    Interventions:  - Provide teaching at level of understanding  - Provide teaching via preferred learning methods  Outcome: Progressing     Problem: RESPIRATORY - ADULT  Goal: Achieves optimal ventilation and oxygenation  Description  INTERVENTIONS:  - Assess for changes in respiratory status  - Assess for changes in mentation and behavior  - Position to facilitate oxygenation and minimize respiratory effort  - Oxygen administered by appropriate delivery if ordered  - Initiate smoking cessation education as indicated  - Encourage broncho-pulmonary hygiene including cough, deep breathe, Incentive Spirometry  - Assess the need for suctioning and aspirate as needed  - Assess and instruct to report SOB or any respiratory difficulty  - Respiratory Therapy support as indicated  Outcome: Progressing

## 2020-04-06 VITALS
BODY MASS INDEX: 19.99 KG/M2 | RESPIRATION RATE: 18 BRPM | HEIGHT: 64 IN | HEART RATE: 100 BPM | SYSTOLIC BLOOD PRESSURE: 108 MMHG | WEIGHT: 117.06 LBS | OXYGEN SATURATION: 96 % | DIASTOLIC BLOOD PRESSURE: 68 MMHG | TEMPERATURE: 98 F

## 2020-04-06 LAB
ALBUMIN SERPL BCP-MCNC: 3.1 G/DL (ref 3.5–5)
ALP SERPL-CCNC: 85 U/L (ref 46–116)
ALT SERPL W P-5'-P-CCNC: 29 U/L (ref 12–78)
ANION GAP SERPL CALCULATED.3IONS-SCNC: 10 MMOL/L (ref 4–13)
AST SERPL W P-5'-P-CCNC: 16 U/L (ref 5–45)
ATRIAL RATE: 86 BPM
BACTERIA BLD CULT: NORMAL
BACTERIA BLD CULT: NORMAL
BASOPHILS # BLD AUTO: 0.02 THOUSANDS/ΜL (ref 0–0.1)
BASOPHILS NFR BLD AUTO: 0 % (ref 0–1)
BILIRUB SERPL-MCNC: 0.3 MG/DL (ref 0.2–1)
BUN SERPL-MCNC: 5 MG/DL (ref 5–25)
CALCIUM SERPL-MCNC: 9.5 MG/DL (ref 8.3–10.1)
CHLORIDE SERPL-SCNC: 103 MMOL/L (ref 100–108)
CO2 SERPL-SCNC: 25 MMOL/L (ref 21–32)
CREAT SERPL-MCNC: 0.48 MG/DL (ref 0.6–1.3)
CRP SERPL QL: 20.5 MG/L
EOSINOPHIL # BLD AUTO: 0.08 THOUSAND/ΜL (ref 0–0.61)
EOSINOPHIL NFR BLD AUTO: 2 % (ref 0–6)
ERYTHROCYTE [DISTWIDTH] IN BLOOD BY AUTOMATED COUNT: 16 % (ref 11.6–15.1)
GFR SERPL CREATININE-BSD FRML MDRD: 116 ML/MIN/1.73SQ M
GLUCOSE SERPL-MCNC: 121 MG/DL (ref 65–140)
GLUCOSE SERPL-MCNC: 128 MG/DL (ref 65–140)
GLUCOSE SERPL-MCNC: 184 MG/DL (ref 65–140)
HCT VFR BLD AUTO: 35.9 % (ref 34.8–46.1)
HGB BLD-MCNC: 10.9 G/DL (ref 11.5–15.4)
IMM GRANULOCYTES # BLD AUTO: 0.02 THOUSAND/UL (ref 0–0.2)
IMM GRANULOCYTES NFR BLD AUTO: 0 % (ref 0–2)
LYMPHOCYTES # BLD AUTO: 1.22 THOUSANDS/ΜL (ref 0.6–4.47)
LYMPHOCYTES NFR BLD AUTO: 25 % (ref 14–44)
MCH RBC QN AUTO: 24.1 PG (ref 26.8–34.3)
MCHC RBC AUTO-ENTMCNC: 30.4 G/DL (ref 31.4–37.4)
MCV RBC AUTO: 79 FL (ref 82–98)
MONOCYTES # BLD AUTO: 0.51 THOUSAND/ΜL (ref 0.17–1.22)
MONOCYTES NFR BLD AUTO: 10 % (ref 4–12)
NEUTROPHILS # BLD AUTO: 3.07 THOUSANDS/ΜL (ref 1.85–7.62)
NEUTS SEG NFR BLD AUTO: 63 % (ref 43–75)
NRBC BLD AUTO-RTO: 0 /100 WBCS
P AXIS: 44 DEGREES
PLATELET # BLD AUTO: 559 THOUSANDS/UL (ref 149–390)
PMV BLD AUTO: 8.7 FL (ref 8.9–12.7)
POTASSIUM SERPL-SCNC: 4.3 MMOL/L (ref 3.5–5.3)
PR INTERVAL: 182 MS
PROCALCITONIN SERPL-MCNC: <0.05 NG/ML
PROT SERPL-MCNC: 7.6 G/DL (ref 6.4–8.2)
QRS AXIS: 52 DEGREES
QRSD INTERVAL: 102 MS
QT INTERVAL: 360 MS
QTC INTERVAL: 430 MS
RBC # BLD AUTO: 4.53 MILLION/UL (ref 3.81–5.12)
SODIUM SERPL-SCNC: 138 MMOL/L (ref 136–145)
T WAVE AXIS: 46 DEGREES
VENTRICULAR RATE: 86 BPM
WBC # BLD AUTO: 4.92 THOUSAND/UL (ref 4.31–10.16)

## 2020-04-06 PROCEDURE — 99238 HOSP IP/OBS DSCHRG MGMT 30/<: CPT | Performed by: INTERNAL MEDICINE

## 2020-04-06 PROCEDURE — 84145 PROCALCITONIN (PCT): CPT | Performed by: INTERNAL MEDICINE

## 2020-04-06 PROCEDURE — 99232 SBSQ HOSP IP/OBS MODERATE 35: CPT | Performed by: INTERNAL MEDICINE

## 2020-04-06 PROCEDURE — 93010 ELECTROCARDIOGRAM REPORT: CPT | Performed by: INTERNAL MEDICINE

## 2020-04-06 PROCEDURE — 86140 C-REACTIVE PROTEIN: CPT | Performed by: INTERNAL MEDICINE

## 2020-04-06 PROCEDURE — 93005 ELECTROCARDIOGRAM TRACING: CPT

## 2020-04-06 PROCEDURE — 85025 COMPLETE CBC W/AUTO DIFF WBC: CPT | Performed by: INTERNAL MEDICINE

## 2020-04-06 PROCEDURE — 82948 REAGENT STRIP/BLOOD GLUCOSE: CPT

## 2020-04-06 PROCEDURE — 80053 COMPREHEN METABOLIC PANEL: CPT | Performed by: INTERNAL MEDICINE

## 2020-04-06 RX ORDER — GUAIFENESIN/DEXTROMETHORPHAN 100-10MG/5
10 SYRUP ORAL EVERY 4 HOURS PRN
Qty: 118 ML | Refills: 0 | Status: SHIPPED | OUTPATIENT
Start: 2020-04-06

## 2020-04-06 RX ORDER — ACETAMINOPHEN 325 MG/1
650 TABLET ORAL EVERY 6 HOURS PRN
Qty: 30 TABLET | Refills: 0 | Status: SHIPPED | OUTPATIENT
Start: 2020-04-06

## 2020-04-06 RX ORDER — ASCORBIC ACID 500 MG
500 TABLET ORAL DAILY
Qty: 15 TABLET | Refills: 0 | Status: SHIPPED | OUTPATIENT
Start: 2020-04-06 | End: 2020-04-21

## 2020-04-06 RX ADMIN — OXYCODONE HYDROCHLORIDE AND ACETAMINOPHEN 500 MG: 500 TABLET ORAL at 08:12

## 2020-04-06 RX ADMIN — HYDROXYCHLOROQUINE SULFATE 200 MG: 200 TABLET, FILM COATED ORAL at 08:13

## 2020-04-06 RX ADMIN — GLIMEPIRIDE 2 MG: 2 TABLET ORAL at 08:15

## 2020-04-06 RX ADMIN — ENOXAPARIN SODIUM 40 MG: 40 INJECTION SUBCUTANEOUS at 08:14

## 2020-04-06 RX ADMIN — INSULIN LISPRO 1 UNITS: 100 INJECTION, SOLUTION INTRAVENOUS; SUBCUTANEOUS at 11:09

## 2020-04-06 RX ADMIN — AZITHROMYCIN 250 MG: 250 TABLET, FILM COATED ORAL at 11:09

## 2020-04-06 RX ADMIN — ATORVASTATIN CALCIUM 20 MG: 20 TABLET, FILM COATED ORAL at 08:13

## 2020-04-06 NOTE — DISCHARGE INSTRUCTIONS
[address]    Your healthcare provider and/or public health staff have evaluated you and have determined that you do not need to remain in the hospital at this time  At this time you can be isolated at home where you will be monitored by staff from your local or state health department  You should carefully follow the prevention and isolation steps below until a healthcare provider or local or state health department says that you can return to your normal activities  Stay home except to get medical care    People who are mildly ill with COVID-19 are able to isolate at home during their illness  You should restrict activities outside your home, except for getting medical care  Do not go to work, school, or public areas  Avoid using public transportation, ride-sharing, or taxis  Separate yourself from other people and animals in your home    People: As much as possible, you should stay in a specific room and away from other people in your home  Also, you should use a separate bathroom, if available  Animals: You should restrict contact with pets and other animals while you are sick with COVID-19, just like you would around other people  Although there have not been reports of pets or other animals becoming sick with COVID-19, it is still recommended that people sick with COVID-19 limit contact with animals until more information is known about the virus  When possible, have another member of your household care for your animals while you are sick  If you are sick with COVID-19, avoid contact with your pet, including petting, snuggling, being kissed or licked, and sharing food  If you must care for your pet or be around animals while you are sick, wash your hands before and after you interact with pets and wear a facemask  See COVID-19 and Animals for more information      Call ahead before visiting your doctor    If you have a medical appointment, call the healthcare provider and tell them that you have or may have COVID-19  This will help the healthcare providers office take steps to keep other people from getting infected or exposed  Wear a facemask    You should wear a facemask when you are around other people (e g , sharing a room or vehicle) or pets and before you enter a healthcare providers office  If you are not able to wear a facemask (for example, because it causes trouble breathing), then people who live with you should not stay in the same room with you, or they should wear a facemask if they enter your room  Cover your coughs and sneezes    Cover your mouth and nose with a tissue when you cough or sneeze  Throw used tissues in a lined trash can  Immediately wash your hands with soap and water for at least 20 seconds or, if soap and water are not available, clean your hands with an alcohol-based hand  that contains at least 60% alcohol  Clean your hands often    Wash your hands often with soap and water for at least 20 seconds, especially after blowing your nose, coughing, or sneezing; going to the bathroom; and before eating or preparing food  If soap and water are not readily available, use an alcohol-based hand  with at least 60% alcohol, covering all surfaces of your hands and rubbing them together until they feel dry  Soap and water are the best option if hands are visibly dirty  Avoid touching your eyes, nose, and mouth with unwashed hands  Avoid sharing personal household items    You should not share dishes, drinking glasses, cups, eating utensils, towels, or bedding with other people or pets in your home  After using these items, they should be washed thoroughly with soap and water  Clean all high-touch surfaces everyday    High touch surfaces include counters, tabletops, doorknobs, bathroom fixtures, toilets, phones, keyboards, tablets, and bedside tables  Also, clean any surfaces that may have blood, stool, or body fluids on them   Use a household cleaning spray or wipe, according to the label instructions  Labels contain instructions for safe and effective use of the cleaning product including precautions you should take when applying the product, such as wearing gloves and making sure you have good ventilation during use of the product  Monitor your symptoms    Seek prompt medical attention if your illness is worsening (e g , difficulty breathing)  Before seeking care, call your healthcare provider and tell them that you have, or are being evaluated for, COVID-19  Put on a facemask before you enter the facility  These steps will help the healthcare providers office to keep other people in the office or waiting room from getting infected or exposed  Ask your healthcare provider to call the local or state health department  Persons who are placed under active monitoring or facilitated self-monitoring should follow instructions provided by their local health department or occupational health professionals, as appropriate  If you have a medical emergency and need to call 911, notify the dispatch personnel that you have, or are being evaluated for COVID-19  If possible, put on a facemask before emergency medical services arrive  Discontinuing home isolation    Patients with confirmed COVID-19 should remain under home isolation precautions until the risk of secondary transmission to others is thought to be low  The decision to discontinue home isolation precautions should be made on a case-by-case basis, in consultation with healthcare providers and state and local health departments  Source: [employer] fi      CoVID-19 Maycol de cuidado domiciliario  Xavier proveedor de [place] y/o personal de hitesh pública lo/la ha evaluado y ha determinado que no necesita ser hospitalizado/a en boubacar momento  ?  En boubacar momento usted puede ser aislado/a en casa donde será monitoreado/a por el personal de huston departamento de hitesh local o estatal  Debe seguir cuidadosamente los pasos de prevención y aislamiento que se indican a continuación, hasta que un proveedor de atención médica o el departamento de hitesh local o estatal indique que puede volver a whit actividades normales  Quédate en casa excepto si necesita recibir [employer] que están levemente enfermas con COVID-19 pueden aislarse en casa adelso huston recuperacion  Usted debe restringir las actividades fuera de huston hogar, excepto para recibir [employer]  No se presente al Sachin Solano, a la escuela o en áreas públicas  Evite el uso del transporte público, el uso compartido de transporte o los taxis  Aislese de [employer] y [employer] en huston domicilio  Personas: En la medida de lo posible, debe quedarte en moses habitación específica y lejos de otras personas en huston hogar  Además, debe usar un baño separado, si está disponible  Animales: Debe restringir el contacto con mascotas y otros animales [address] enfermo/a con COVID-19, al igual que lo haría con Arav  Aunque no rahman habido informes de mascotas u otros animales que se enferman con COVID-19, todavía se recomienda que las personas enfermas con COVID-19 limiten el contacto con los animales hasta que se sepa más información sobre el virus  En lo posible, pida a otro miembro de huston hogar que cuide de whit animales mientras esté enfermo/a  Si está enfermo/a con COVID-19, evite el contacto con huston mascota, incluyendo acariciar, abrazar, besar o ser lamido/a, al igual que compartir alimentos  Si debe cuidar de huston mascota o estar cerca de [employer] está enfermo/a, lávese las pamela antes y después de interactuar con las mascotas y use moses máscara facial  Consulte COVID-19 y Animales para obtener Lexy Pencil  Llame antes de visitar a huston médico  Si tiene [employer], llame al proveedor de Doll West  y dígale que tiene o puede tener COVID-19   [place] ayudará al consultorio del proveedor de Jose gorman a pratik medidas para evitar que otras personas se infecten o expongan  Utilize máscara facial  Debe usar mascara facial cuando esté cerca de otras personas (por ejemplo, compartir moses habitación o vehículo) o mascotas y antes de entrar en la oficina de un proveedor de Jose gorman  Si usted no puede usar máscara facial (por ejemplo, porque causa problemas para respirar), entonces las personas que viven con usted no deben permanecer en la misma habitación con usted, o deben usar máscara facial si entran a huston habitación  Gagan Smack huston tos y/o estornudos   Cúbrase la boca y la [place] con un pañuelo desechable cuando tosa o estornude  Tire los pañuelos usados en un contenedor de basura que tenga cobertura  Lávese las pamela inmediatamente con agua y jabón adelso al menos 21 segundos o, si no hay agua y jabón disponibles, límpiese las pamela con un desinfectante de pamela a base de alcohol que contenga al menos un 60% de alcohol  Límpiese las pamela con frecuencia  [employer] las pamela a menudo con agua y jabón adelso al menos 20 segundos, especialmente después de sonarse la nariz, toser o estornudar, ir al baño, y antes de comer o preparar alimentos  Si agua y jabón no están disponibles fácilmente, utilize un desinfectante de pamela a base de alcohol con al menos un 60% de alcohol, cubriendo todas las superficies de whit pamela y frotándolas hasta que se sientan secas  Maty Brody y agua son la mejor opción si las pamela están visiblemente sucias  Evite tocarse los ojos, la nariz y la boca con las pamela sin Maria Alejandra Moles  Evite compartir artículos personales en el hogar  No debe compartir platos, vasos para beber, tazas, utensilios para comer, toallas o ropa de cama con otras personas o mascotas en huston hogar  Después de [employer], deben lavarse muy mikki con agua y Yakov    Limpie todas las superficies "de toque frequente" todos los [address] de toque fecuente incluyen encimeras o mesones, mesas, escritorios, pomos o perillas de ofelia, accesorios de baño, inodoros, teléfonos, teclados, tabletas y [address] de noche  Además, limpie las superficies que puedan contener ella, heces fecales o líquidos corporales  Utilice un spray de limpieza para el hogar o moses toallita desechable, de acuerdo con las instrucciones de la etiqueta del product utilizado para limpiar  Las [employer] contienen instrucciones para un uso seguro y eficaz del producto de limpieza, incluidas las precauciones que deben tomarse al aplicar el producto, incluyendo el uso de guantes y asegurarse de que haya moses buena ventilación adelso el uso del producto  Monitorea whit síntomas  Busca atención médica inmediata si tu enfermedad está empeorando (por ejemplo, dificultad para respirar)  Antes de buscar [employer], llame a huston proveedor de [employer] y carmen que tiene, o está siendo evaluado para, COVID-19  Angel moses mascara facial antes de entrar en las instalaciones  Estos pasos ayudarán al [employer] del proveedor de atención médica a evitar que otras personas en la oficina o la radha de espera se infecten o expongan  [place] a huston proveedor de [employer] llame al departamento de hitesh local o estatal  Las personas que se sometan a un seguimiento activo o que se les facilite la autosupervisión deben seguir las instrucciones proporcionadas por huston departamento de hitesh local o profesionales de hitesh ocupacional, según sea apropiado  Si tiene moses emergencia médica y necesita llamar al 911, notifique al personal de despacho que tiene o está siendo evaluado para COVID-19  Si es posible, pongase moses mascara facial antes de que lleguen los servicios médicos de emergencia  Descontinuacion de [employer] domiciliario  Los pacientes con COVID-19 confirmados deben permanecer bajo precauciones de aislamiento domiciliario hasta que se crea que el riesgo de transmisión secundaria a otros es bajo   La decisión de suspender las precauciones de aislamiento domiciliario deben tomarse de acuerdo a cada bianca, en consulta con los proveedores de atención médica y los departamentos de hitesh estatales y locales      Source: [employer] fi

## 2020-04-06 NOTE — UTILIZATION REVIEW
Notification of Discharge  This is a Notification of Discharge from our facility [address]  Please be advised that this patient has been discharge from our facility  Below you will find the admission and discharge date and time including the patients disposition  PRESENTATION DATE: 4/1/2020  8:41 AM  OBS ADMISSION DATE:   IP ADMISSION DATE: 4/1/20 1059   DISCHARGE DATE: 4/6/2020  1:47 PM  DISPOSITION: Home/Self Care Home/Self Care   Admission Orders listed below:  Admission Orders (From admission, onward)     Ordered        04/01/20 1059  Inpatient Admission (expected length of stay for this patient Order details is greater than two midnights)  Once         04/01/20 1059  Inpatient Admission (expected length of stay for this patient Order details is greater than two midnights)  Once                   Please contact the UR Department if additional information is required to close this patient's authorization/case  Maylin Crowell  [facility] Utilization Review Department  Main: [phone] x carefully listen to the prompts  All voicemails are confidential   [email or web address]  Send all requests for admission clinical reviews, approved or denied determinations and any other requests to dedicated fax number below belonging to the campus where the patient is receiving treatment   List of dedicated fax numbers:  [address] [facility] DENIALS (Administrative/Medical Necessity) [phone]   [address] [place] [address] (Maternity/NICU/Pediatrics) [phone]   Sowmya Alamo [phone]   Alonso Bone [phone]   Khari Figueroa [phone]   [facility] [address] [facility] [phone]   [facility]  [phone]   [address] [facility]   [address] [facility] [phone]   Ricky Claire Isabelle Resendiz [phone]

## 2020-04-06 NOTE — SOCIAL WORK
CM was informed that pt is stable for discharge  Pt's  able to provide transportation at 1:00 PM  No additional concerns identified

## 2020-04-06 NOTE — ASSESSMENT & PLAN NOTE
Infectious disease evaluation appreciated  Finished hydroxychloroquine azithromycin  Currently on room air and afebrile

## 2020-04-06 NOTE — PLAN OF CARE
Problem: Potential for Falls  Goal: Patient will remain free of falls  Description  INTERVENTIONS:  - Assess patient frequently for physical needs  -  Identify cognitive and physical deficits and behaviors that affect risk of falls    -  [place] fall precautions as indicated by assessment   - Educate patient/family on patient safety including physical limitations  - Instruct patient to call for assistance with activity based on assessment  - Modify environment to reduce risk of injury  - Consider OT/PT consult to assist with strengthening/mobility  Outcome: Progressing     Problem: PAIN - ADULT  Goal: Verbalizes/displays adequate comfort level or baseline comfort level  Description  Interventions:  - Encourage patient to monitor pain and request assistance  - Assess pain using appropriate pain scale  - Administer analgesics based on type and severity of pain and evaluate response  - Implement non-pharmacological measures as appropriate and evaluate response  - Consider cultural and social influences on pain and pain management  - Notify physician/advanced practitioner if interventions unsuccessful or patient reports new pain  Outcome: Progressing     Problem: INFECTION - ADULT  Goal: Absence or prevention of progression during hospitalization  Description  INTERVENTIONS:  - Assess and monitor for signs and symptoms of infection  - Monitor lab/diagnostic results  - Monitor all insertion sites, i e  indwelling lines, tubes, and drains  - Monitor endotracheal if appropriate and nasal secretions for changes in amount and color  - [place] appropriate cooling/warming therapies per order  - Administer medications as ordered  - Instruct and encourage patient and family to use good hand hygiene technique  - Identify and instruct in appropriate isolation precautions for identified infection/condition  Outcome: Progressing  Goal: Absence of fever/infection during neutropenic period  Description  INTERVENTIONS:  - Monitor WBC    Outcome: Progressing     Problem: SAFETY ADULT  Goal: Maintain or return to baseline ADL function  Description  INTERVENTIONS:  -  Assess patient's ability to carry out ADLs; assess patient's baseline for ADL function and identify physical deficits which impact ability to perform ADLs (bathing, care of mouth/teeth, toileting, grooming, dressing, etc )  - Assess/evaluate cause of self-care deficits   - Assess range of motion  - Assess patient's mobility; develop plan if impaired  - Assess patient's need for assistive devices and provide as appropriate  - Encourage maximum independence but intervene and supervise when necessary  - Involve family in performance of ADLs  - Assess for home care needs following discharge   - Consider OT consult to assist with ADL evaluation and planning for discharge  - Provide patient education as appropriate  Outcome: Progressing  Goal: Maintain or return mobility status to optimal level  Description  INTERVENTIONS:  - Assess patient's baseline mobility status (ambulation, transfers, stairs, etc )    - Identify cognitive and physical deficits and behaviors that affect mobility  - Identify mobility aids required to assist with transfers and/or ambulation (gait belt, sit-to-stand, lift, walker, cane, etc )  - [place] fall precautions as indicated by assessment  - Record patient progress and toleration of activity level on Mobility SBAR; progress patient to next Phase/Stage  - Instruct patient to call for assistance with activity based on assessment  - Consider rehabilitation consult to assist with strengthening/weightbearing, etc   Outcome: Progressing     Problem: DISCHARGE PLANNING  Goal: Discharge to home or other facility with appropriate resources  Description  INTERVENTIONS:  - Identify barriers to discharge w/patient and caregiver  - Arrange for needed discharge resources and transportation as appropriate  - Identify discharge learning needs (meds, wound care, etc )  - Arrange for interpretive services to assist at discharge as needed  - Refer to Case Management Department for coordinating discharge planning if the patient needs post-hospital services based on physician/advanced practitioner order or complex needs related to functional status, cognitive ability, or social support system  Outcome: Progressing     Problem: Knowledge Deficit  Goal: Patient/family/caregiver demonstrates understanding of disease process, treatment plan, medications, and discharge instructions  Description  Complete learning assessment and assess knowledge base    Interventions:  - Provide teaching at level of understanding  - Provide teaching via preferred learning methods  Outcome: Progressing     Problem: RESPIRATORY - ADULT  Goal: Achieves optimal ventilation and oxygenation  Description  INTERVENTIONS:  - Assess for changes in respiratory status  - Assess for changes in mentation and behavior  - Position to facilitate oxygenation and minimize respiratory effort  - Oxygen administered by appropriate delivery if ordered  - Initiate smoking cessation education as indicated  - Encourage broncho-pulmonary hygiene including cough, deep breathe, Incentive Spirometry  - Assess the need for suctioning and aspirate as needed  - Assess and instruct to report SOB or any respiratory difficulty  - Respiratory Therapy support as indicated  Outcome: Progressing

## 2020-04-06 NOTE — DISCHARGE SUMMARY
Discharge- Americo Vargas 1970, 53 y o  female MRN: [id number]    Unit/Bed#: -01 Encounter: [id number]    Primary Care Provider: Mary Thomas MD   Date and time admitted to hospital: 4/1/2020  8:41 AM        COVID-19 virus detected  Assessment & Plan  Infectious disease evaluation appreciated  Finished hydroxychloroquine azithromycin  Currently on room air and afebrile  Type 2 diabetes mellitus, without long-term current use of insulin [facility])  Assessment & Plan  Lab Results   Component Value Date    HGBA1C 7 7 (H) 04/02/2020       Recent Labs     04/05/20  1519 04/05/20  2100 04/06/20  0506 04/06/20  1107   POCGLU 107 150* 128 184*       Blood Sugar Average: Last 72 hrs:  (P) 415 [id number]     Resume home medications on discharge    Viral respiratory infection  Assessment & Plan  COVID 19 positive  Patient afebrile and on room air  Stable for discharge  Hyperlipidemia  Assessment & Plan  Continue statin    Hypertension  Assessment & Plan  Continue home medications  Monitor closely    * SIRS (systemic inflammatory response syndrome) ([facility] [address] )  Assessment & Plan  Patient started on hydroxychloroquine and azithromycin  Was on telemetry and QTC was normal   Evaluated by Infectious Disease  Her inflammatory markers trending down  She is afebrile for more than 48 hours  On room air since admission  Cleared for discharge by ID            Discharging Physician / Practitioner: Andreea Singleton MD  PCP: Mary Thomas MD  Admission Date:   Admission Orders (From admission, onward)     Ordered        04/01/20 1059  Inpatient Admission (expected length of stay for this patient Order details is greater than two midnights)  Once         04/01/20 1059  Inpatient Admission (expected length of stay for this patient Order details is greater than two midnights)  Once                   Discharge Date: 04/06/20    Resolved Problems  Date Reviewed: 4/6/2020    None          Consultations During Hospital Stay:  · Infectious disease    Procedures Performed:   · Chest x-ray    Significant Findings / Test Results:   · Chest x-ray  IMPRESSION:     Patchy bilateral opacity which could be due to viral infection given the patient's history  COVID POSITIVE    Incidental Findings:   ·      Test Results Pending at Discharge (will require follow up):   ·      Outpatient Tests Requested:  ·     Complications:      Reason for Admission:  Cough, short of breath, fever    Hospital Course:     HPI on admission  Aldair Douglas is a 53 y o  female who presents with cough, shortness of breath and fever over 1 week  Cough is nonproductive  She was also having body ache and headache  She went for outpatient COVID -19 testing on March 27th  Results still pending  In the meantime she developed more short of breath and fever  She decided to come to emergency room today  Denies any chest pain or palpitation  In the emergency room she was found to be febrile, tachycardic and tachypneic  Chest x-ray concerned for viral pneumonia  Influenza and RSV negative  Patient also having diarrhea off and on  No bowel movement since yesterday    Hospital course  Patient was started on hydroxychloroquine azithromycin  Evaluated by Infectious Disease  Her condition improved gradually  Been afebrile and on room air  Inflammatory markers trending down  Today she feels much better  Cleared by ID for discharge  Currently clinically and hemodynamically stable to be discharged home  Please see above list of diagnoses and related plan for additional information       Condition at Discharge: good     Discharge Day Visit / Exam:     Subjective:    Vitals: Blood Pressure: 108/68 (04/06/20 0700)  Pulse: 100 (04/06/20 1050)  Temperature: 98 °F (36 7 °C) (04/06/20 0700)  Temp Source: Oral (04/06/20 0700)  Respirations: 18 (04/06/20 0700)  Height: 5' 4" (162 6 cm) (04/01/20 3486)  Weight - Scale: 53 1 kg (117 lb 1 oz) (04/01/20 8910)  SpO2: 96 % (04/06/20 1050)  Exam:   Physical Exam  Limited due to pandemic COVID-19 infection  General- Awake, alert and oriented x 3, looks comfortable  Respiratory system- breathing on room air, not using any accessory muscles  Psych- No acute psychosis  CNS- moving all extremities    Discussion with Family:     Discharge instructions/Information to patient and family:   See after visit summary for information provided to patient and family  Provisions for Follow-Up Care:  See after visit summary for information related to follow-up care and any pertinent home health orders  Disposition:     Home    For Discharges to [facility] SNF:   · Not Applicable to this Patient - Not Applicable to this Patient    Planned Readmission:      Discharge Statement:  I spent 25 minutes discharging the patient  This time was spent on the day of discharge  I had direct contact with the patient on the day of discharge  Greater than 50% of the total time was spent examining patient, answering all patient questions, arranging and discussing plan of care with patient as well as directly providing post-discharge instructions  Additional time then spent on discharge activities  Discharge Medications:  See after visit summary for reconciled discharge medications provided to patient and family        ** Please Note: This note has been constructed using a voice recognition system **

## 2020-04-06 NOTE — PLAN OF CARE
Problem: Potential for Falls  Goal: Patient will remain free of falls  Description  INTERVENTIONS:  - Assess patient frequently for physical needs  -  Identify cognitive and physical deficits and behaviors that affect risk of falls    -  [place] fall precautions as indicated by assessment   - Educate patient/family on patient safety including physical limitations  - Instruct patient to call for assistance with activity based on assessment  - Modify environment to reduce risk of injury  - Consider OT/PT consult to assist with strengthening/mobility  Outcome: Progressing     Problem: PAIN - ADULT  Goal: Verbalizes/displays adequate comfort level or baseline comfort level  Description  Interventions:  - Encourage patient to monitor pain and request assistance  - Assess pain using appropriate pain scale  - Administer analgesics based on type and severity of pain and evaluate response  - Implement non-pharmacological measures as appropriate and evaluate response  - Consider cultural and social influences on pain and pain management  - Notify physician/advanced practitioner if interventions unsuccessful or patient reports new pain  Outcome: Progressing     Problem: INFECTION - ADULT  Goal: Absence or prevention of progression during hospitalization  Description  INTERVENTIONS:  - Assess and monitor for signs and symptoms of infection  - Monitor lab/diagnostic results  - Monitor all insertion sites, i e  indwelling lines, tubes, and drains  - Monitor endotracheal if appropriate and nasal secretions for changes in amount and color  - [place] appropriate cooling/warming therapies per order  - Administer medications as ordered  - Instruct and encourage patient and family to use good hand hygiene technique  - Identify and instruct in appropriate isolation precautions for identified infection/condition  Outcome: Progressing  Goal: Absence of fever/infection during neutropenic period  Description  INTERVENTIONS:  - Monitor WBC    Outcome: Progressing     Problem: SAFETY ADULT  Goal: Maintain or return to baseline ADL function  Description  INTERVENTIONS:  -  Assess patient's ability to carry out ADLs; assess patient's baseline for ADL function and identify physical deficits which impact ability to perform ADLs (bathing, care of mouth/teeth, toileting, grooming, dressing, etc )  - Assess/evaluate cause of self-care deficits   - Assess range of motion  - Assess patient's mobility; develop plan if impaired  - Assess patient's need for assistive devices and provide as appropriate  - Encourage maximum independence but intervene and supervise when necessary  - Involve family in performance of ADLs  - Assess for home care needs following discharge   - Consider OT consult to assist with ADL evaluation and planning for discharge  - Provide patient education as appropriate  Outcome: Progressing  Goal: Maintain or return mobility status to optimal level  Description  INTERVENTIONS:  - Assess patient's baseline mobility status (ambulation, transfers, stairs, etc )    - Identify cognitive and physical deficits and behaviors that affect mobility  - Identify mobility aids required to assist with transfers and/or ambulation (gait belt, sit-to-stand, lift, walker, cane, etc )  - [place] fall precautions as indicated by assessment  - Record patient progress and toleration of activity level on Mobility SBAR; progress patient to next Phase/Stage  - Instruct patient to call for assistance with activity based on assessment  - Consider rehabilitation consult to assist with strengthening/weightbearing, etc   Outcome: Progressing     Problem: DISCHARGE PLANNING  Goal: Discharge to home or other facility with appropriate resources  Description  INTERVENTIONS:  - Identify barriers to discharge w/patient and caregiver  - Arrange for needed discharge resources and transportation as appropriate  - Identify discharge learning needs (meds, wound care, etc )  - Arrange for interpretive services to assist at discharge as needed  - Refer to Case Management Department for coordinating discharge planning if the patient needs post-hospital services based on physician/advanced practitioner order or complex needs related to functional status, cognitive ability, or social support system  Outcome: Progressing     Problem: Knowledge Deficit  Goal: Patient/family/caregiver demonstrates understanding of disease process, treatment plan, medications, and discharge instructions  Description  Complete learning assessment and assess knowledge base    Interventions:  - Provide teaching at level of understanding  - Provide teaching via preferred learning methods  Outcome: Progressing     Problem: RESPIRATORY - ADULT  Goal: Achieves optimal ventilation and oxygenation  Description  INTERVENTIONS:  - Assess for changes in respiratory status  - Assess for changes in mentation and behavior  - Position to facilitate oxygenation and minimize respiratory effort  - Oxygen administered by appropriate delivery if ordered  - Initiate smoking cessation education as indicated  - Encourage broncho-pulmonary hygiene including cough, deep breathe, Incentive Spirometry  - Assess the need for suctioning and aspirate as needed  - Assess and instruct to report SOB or any respiratory difficulty  - Respiratory Therapy support as indicated  Outcome: Progressing

## 2020-04-06 NOTE — ASSESSMENT & PLAN NOTE
Lab Results   Component Value Date    HGBA1C 7 7 (H) 04/02/2020       Recent Labs     04/05/20  1519 04/05/20  2100 04/06/20  0506 04/06/20  1107   POCGLU 107 150* 128 184*       Blood Sugar Average: Last 72 hrs:  (P) 288 [id number]     Resume home medications on discharge

## 2020-04-06 NOTE — ASSESSMENT & PLAN NOTE
Patient started on hydroxychloroquine and azithromycin  Was on telemetry and QTC was normal   Evaluated by Infectious Disease  Her inflammatory markers trending down  She is afebrile for more than 48 hours  On room air since admission  Cleared for discharge by ID

## 2020-04-06 NOTE — DISCHARGE INSTR - AVS FIRST PAGE
Follow-up with PCP in 1 week  Come back to the emergency room if condition worsen or recur  Continue self quarantine as instructed

## 2020-04-06 NOTE — PROGRESS NOTES
Progress Note - Infectious Disease   Alonso Cuadra 53 y o  female MRN: [id number]  Unit/Bed#: -01 Encounter: [id number]      Impression/Recommendations:  1  SIRS, POA, presented with fever and tachycardia/tachypnea  Source is most likely pneumonia  Patient is clinically much improved  Temperature has normalized  Tachycardia and tachypnea has resolved  She remains systemically and hemodynamically well  Admission blood cultures remain negative  Antibiotics/antiviral plan as in below  Monitor temperature/WBC  Follow-up on pending blood cultures      2  COVID-19 pneumonia  Patient is clinically much improved on HQ/azithromycinDyspnea and cough are improved  Temperature has normalized  Influenza PCR negative  Continue HQ/azithromycin  Complete the full 5 day course today  Monitor respiratory symptoms      4  DM, somewhat poorly controlled, with elevated he might 1 C  Blood sugar has been well controlled here      Discussed with patient in detail regarding the above plan  Discussed with Dr Shama Oconnor from [facility] service  Okay for discharge from ID viewpoint  Antibiotics:  HQ/azithromycin # 5     Subjective:  Patient feels well  She still has cough, mostly nonproductive and improving  Dyspnea much improved  Patient is ambulating in the room without much dyspnea  Temperature is down  No chills  Objective:  Vitals:  Temp:  [97 7 °F (36 5 °C)-98 7 °F (37 1 °C)] 98 °F (36 7 °C)  HR:  [84-92] 89  Resp:  [18] 18  BP: ()/(54-68) 108/68  SpO2:  [94 %-98 %] 98 %  Temp (24hrs), Av 2 °F (36 8 °C), Min:97 7 °F (36 5 °C), Max:98 7 °F (37 1 °C)  Current: Temperature: 98 °F (36 7 °C)    Physical Exam:    Parts of the exam were were done by the Medicine service and discussed with me, due to the infection control issues related to the current COVID crisis  General: Awake, alert, cooperative, no distress     Lungs: Expansion symmetric, no rales, no wheezing, respirations unlabored  Heart:  Regular rate and rhythm, S1 and S2 normal, no murmur  Abdomen: Soft, nondistended, non-tender, bowel sounds active all four quadrants, no masses, no organomegaly  Extremities: No edema  No erythema/warmth  No ulcer  Nontender to palpation  Skin:  No rash  Neuro: Moves all extremities  Invasive Devices     Peripheral Intravenous Line            Peripheral IV 04/03/20 Distal;Right;Ventral (anterior) Forearm 2 days                Labs studies:   I have personally reviewed pertinent labs  Results from last 7 days   Lab Units 04/06/20  0512 04/05/20  0525 04/04/20  0708   POTASSIUM mmol/L 4 3 3 9 4 1   CHLORIDE mmol/L 103 104 104   CO2 mmol/L 25 24 25   BUN mg/dL 5 9 8   CREATININE mg/dL 0 48* 0 38* 0 45*   EGFR ml/min/1 73sq m 116 125 118   CALCIUM mg/dL 9 5 9 1 8 9   AST U/L 16 17 18   ALT U/L 29 29 22   ALK PHOS U/L 85 77 67     Results from last 7 days   Lab Units 04/06/20  0512 04/05/20  0525 04/04/20  0708   WBC Thousand/uL 4 92 5 27 5 01   HEMOGLOBIN g/dL 10 9* 10 3* 10 5*   PLATELETS Thousands/uL 559* 488* 399*     Results from last 7 days   Lab Units 04/01/20  1750 04/01/20  0900 04/01/20  0858   BLOOD CULTURE   --  No Growth After 4 Days  No Growth After 4 Days  LEGIONELLA URINARY ANTIGEN  Negative  --   --        Imaging Studies:   I have personally reviewed pertinent imaging study reports and images in PACS  EKG, Pathology, and Other Studies:   I have personally reviewed pertinent reports

## 2020-04-07 LAB
ATRIAL RATE: 87 BPM
ATRIAL RATE: 88 BPM
P AXIS: 57 DEGREES
P AXIS: 59 DEGREES
PR INTERVAL: 168 MS
PR INTERVAL: 182 MS
QRS AXIS: 54 DEGREES
QRS AXIS: 68 DEGREES
QRSD INTERVAL: 102 MS
QRSD INTERVAL: 106 MS
QT INTERVAL: 362 MS
QT INTERVAL: 366 MS
QTC INTERVAL: 435 MS
QTC INTERVAL: 442 MS
T WAVE AXIS: 46 DEGREES
T WAVE AXIS: 59 DEGREES
VENTRICULAR RATE: 87 BPM
VENTRICULAR RATE: 88 BPM

## 2020-04-07 PROCEDURE — 93010 ELECTROCARDIOGRAM REPORT: CPT | Performed by: INTERNAL MEDICINE

## 2020-04-07 NOTE — UTILIZATION REVIEW
Notification of Discharge  This is a Notification of Discharge from our facility [address]  Please be advised that this patient has been discharge from our facility  Below you will find the admission and discharge date and time including the patients disposition  PRESENTATION DATE: 4/1/2020  8:41 AM  OBS ADMISSION DATE:   IP ADMISSION DATE: 4/1/20 1059   DISCHARGE DATE: 4/6/2020  1:47 PM  DISPOSITION: Home/Self Care Home/Self Care   Admission Orders listed below:  Admission Orders (From admission, onward)     Ordered        04/01/20 1059  Inpatient Admission (expected length of stay for this patient Order details is greater than two midnights)  Once         04/01/20 1059  Inpatient Admission (expected length of stay for this patient Order details is greater than two midnights)  Once                   Please contact the UR Department if additional information is required to close this patient's authorization/case  Eliza Swann  [facility] Utilization Review Department  Main: [phone] x carefully listen to the prompts  All voicemails are confidential   [email or web address]  Send all requests for admission clinical reviews, approved or denied determinations and any other requests to dedicated fax number below belonging to the campus where the patient is receiving treatment   List of dedicated fax numbers:  [address] DENIALS (Administrative/Medical Necessity) [phone]   [address] (Maternity/NICU/Pediatrics) [phone]   [facility] [phone]   Sravani Christianson [phone]   [facility] [phone]   Anders Buerger [facility] [address] [facility] [phone]   [facility]  [phone]   [address] Chris Sánchez [phone]   Bhup[facility] Sandy Charles Emmer [phone]

## 2020-05-14 ENCOUNTER — PATIENT OUTREACH (OUTPATIENT)
Dept: CASE MANAGEMENT | Facility: HOSPITAL | Age: 50
End: 2020-05-14

## 2020-05-18 ENCOUNTER — TELEPHONE (OUTPATIENT)
Dept: PULMONOLOGY | Facility: HOSPITAL | Age: 50
End: 2020-05-18

## 2020-05-19 ENCOUNTER — TELEPHONE (OUTPATIENT)
Dept: PULMONOLOGY | Facility: CLINIC | Age: 50
End: 2020-05-19

## 2021-12-17 ENCOUNTER — IMMUNIZATIONS (OUTPATIENT)
Dept: FAMILY MEDICINE CLINIC | Facility: HOSPITAL | Age: 51
End: 2021-12-17

## 2021-12-17 DIAGNOSIS — Z23 ENCOUNTER FOR IMMUNIZATION: Primary | ICD-10-CM

## 2021-12-17 PROCEDURE — 0001A COVID-19 PFIZER VACC 0.3 ML: CPT

## 2021-12-17 PROCEDURE — 91300 COVID-19 PFIZER VACC 0.3 ML: CPT

## 2022-12-08 ENCOUNTER — HOSPITAL ENCOUNTER (INPATIENT)
Facility: HOSPITAL | Age: 52
LOS: 1 days | Discharge: HOME | End: 2022-12-11
Attending: EMERGENCY MEDICINE | Admitting: STUDENT IN AN ORGANIZED HEALTH CARE EDUCATION/TRAINING PROGRAM

## 2022-12-08 ENCOUNTER — APPOINTMENT (EMERGENCY)
Dept: CT IMAGING | Facility: HOSPITAL | Age: 52
End: 2022-12-08

## 2022-12-08 DIAGNOSIS — R40.4 TRANSIENT ALTERATION OF AWARENESS: ICD-10-CM

## 2022-12-08 DIAGNOSIS — I65.21 CAROTID STENOSIS, ASYMPTOMATIC, RIGHT: ICD-10-CM

## 2022-12-08 DIAGNOSIS — R20.0 FACIAL NUMBNESS: ICD-10-CM

## 2022-12-08 DIAGNOSIS — R47.01 EXPRESSIVE APHASIA: Primary | ICD-10-CM

## 2022-12-08 DIAGNOSIS — G95.20 CORD COMPRESSION (HCC): ICD-10-CM

## 2022-12-08 DIAGNOSIS — R20.2 PARESTHESIAS: ICD-10-CM

## 2022-12-08 DIAGNOSIS — G45.9 TIA (TRANSIENT ISCHEMIC ATTACK): ICD-10-CM

## 2022-12-08 LAB
ALBUMIN SERPL BCP-MCNC: 4.3 G/DL (ref 3.5–5)
ALP SERPL-CCNC: 73 U/L (ref 46–116)
ALT SERPL W P-5'-P-CCNC: 26 U/L (ref 12–78)
ANION GAP SERPL CALCULATED.3IONS-SCNC: 11 MMOL/L (ref 4–13)
AST SERPL W P-5'-P-CCNC: 18 U/L (ref 5–45)
ATRIAL RATE: 100 BPM
ATRIAL RATE: 98 BPM
BASOPHILS # BLD AUTO: 0.06 THOUSANDS/ÂΜL (ref 0–0.1)
BASOPHILS NFR BLD AUTO: 1 % (ref 0–1)
BILIRUB SERPL-MCNC: 0.66 MG/DL (ref 0.2–1)
BUN SERPL-MCNC: 11 MG/DL (ref 5–25)
CALCIUM SERPL-MCNC: 9.9 MG/DL (ref 8.3–10.1)
CARDIAC TROPONIN I PNL SERPL HS: <2 NG/L
CARDIAC TROPONIN I PNL SERPL HS: <2 NG/L
CHLORIDE SERPL-SCNC: 101 MMOL/L (ref 96–108)
CO2 SERPL-SCNC: 27 MMOL/L (ref 21–32)
CREAT SERPL-MCNC: 0.54 MG/DL (ref 0.6–1.3)
EOSINOPHIL # BLD AUTO: 0.05 THOUSAND/ÂΜL (ref 0–0.61)
EOSINOPHIL NFR BLD AUTO: 1 % (ref 0–6)
ERYTHROCYTE [DISTWIDTH] IN BLOOD BY AUTOMATED COUNT: 11.4 % (ref 11.6–15.1)
GFR SERPL CREATININE-BSD FRML MDRD: 109 ML/MIN/1.73SQ M
GLUCOSE SERPL-MCNC: 147 MG/DL (ref 65–140)
GLUCOSE SERPL-MCNC: 158 MG/DL (ref 65–140)
GLUCOSE SERPL-MCNC: 178 MG/DL (ref 65–140)
HCT VFR BLD AUTO: 43.5 % (ref 34.8–46.1)
HGB BLD-MCNC: 14.7 G/DL (ref 11.5–15.4)
IMM GRANULOCYTES # BLD AUTO: 0.01 THOUSAND/UL (ref 0–0.2)
IMM GRANULOCYTES NFR BLD AUTO: 0 % (ref 0–2)
LYMPHOCYTES # BLD AUTO: 1.86 THOUSANDS/ÂΜL (ref 0.6–4.47)
LYMPHOCYTES NFR BLD AUTO: 33 % (ref 14–44)
MCH RBC QN AUTO: 29.7 PG (ref 26.8–34.3)
MCHC RBC AUTO-ENTMCNC: 33.8 G/DL (ref 31.4–37.4)
MCV RBC AUTO: 88 FL (ref 82–98)
MONOCYTES # BLD AUTO: 0.41 THOUSAND/ÂΜL (ref 0.17–1.22)
MONOCYTES NFR BLD AUTO: 7 % (ref 4–12)
NEUTROPHILS # BLD AUTO: 3.31 THOUSANDS/ÂΜL (ref 1.85–7.62)
NEUTS SEG NFR BLD AUTO: 58 % (ref 43–75)
NRBC BLD AUTO-RTO: 0 /100 WBCS
P AXIS: 116 DEGREES
P AXIS: 67 DEGREES
PLATELET # BLD AUTO: 265 THOUSANDS/UL (ref 149–390)
PMV BLD AUTO: 9.4 FL (ref 8.9–12.7)
POTASSIUM SERPL-SCNC: 5.1 MMOL/L (ref 3.5–5.3)
PR INTERVAL: 186 MS
PR INTERVAL: 186 MS
PROT SERPL-MCNC: 8.1 G/DL (ref 6.4–8.4)
QRS AXIS: 103 DEGREES
QRS AXIS: 85 DEGREES
QRSD INTERVAL: 106 MS
QRSD INTERVAL: 96 MS
QT INTERVAL: 342 MS
QT INTERVAL: 344 MS
QTC INTERVAL: 439 MS
QTC INTERVAL: 441 MS
RBC # BLD AUTO: 4.95 MILLION/UL (ref 3.81–5.12)
SODIUM SERPL-SCNC: 139 MMOL/L (ref 135–147)
T WAVE AXIS: 119 DEGREES
T WAVE AXIS: 74 DEGREES
VENTRICULAR RATE: 100 BPM
VENTRICULAR RATE: 98 BPM
WBC # BLD AUTO: 5.7 THOUSAND/UL (ref 4.31–10.16)

## 2022-12-08 RX ORDER — ATORVASTATIN CALCIUM 40 MG/1
40 TABLET, FILM COATED ORAL EVERY EVENING
Status: DISCONTINUED | OUTPATIENT
Start: 2022-12-08 | End: 2022-12-09

## 2022-12-08 RX ORDER — TAMOXIFEN CITRATE 10 MG/1
20 TABLET ORAL 2 TIMES DAILY
Status: DISCONTINUED | OUTPATIENT
Start: 2022-12-08 | End: 2022-12-08

## 2022-12-08 RX ORDER — ACETAMINOPHEN 325 MG/1
650 TABLET ORAL EVERY 6 HOURS PRN
Status: DISCONTINUED | OUTPATIENT
Start: 2022-12-08 | End: 2022-12-11 | Stop reason: HOSPADM

## 2022-12-08 RX ORDER — INSULIN LISPRO 100 [IU]/ML
1-5 INJECTION, SOLUTION INTRAVENOUS; SUBCUTANEOUS
Status: DISCONTINUED | OUTPATIENT
Start: 2022-12-08 | End: 2022-12-11 | Stop reason: HOSPADM

## 2022-12-08 RX ORDER — INSULIN GLARGINE-YFGN 100 [IU]/ML
10 INJECTION, SOLUTION SUBCUTANEOUS DAILY
COMMUNITY
Start: 2022-07-26

## 2022-12-08 RX ORDER — DULAGLUTIDE 0.75 MG/.5ML
0.75 INJECTION, SOLUTION SUBCUTANEOUS WEEKLY
COMMUNITY

## 2022-12-08 RX ORDER — FERROUS SULFATE 325(65) MG
325 TABLET ORAL
COMMUNITY

## 2022-12-08 RX ORDER — HEPARIN SODIUM 5000 [USP'U]/ML
5000 INJECTION, SOLUTION INTRAVENOUS; SUBCUTANEOUS EVERY 8 HOURS SCHEDULED
Status: DISCONTINUED | OUTPATIENT
Start: 2022-12-08 | End: 2022-12-11 | Stop reason: HOSPADM

## 2022-12-08 RX ORDER — LOSARTAN POTASSIUM 25 MG/1
25 TABLET ORAL DAILY
COMMUNITY

## 2022-12-08 RX ORDER — ASPIRIN 81 MG/1
81 TABLET, CHEWABLE ORAL DAILY
Status: DISCONTINUED | OUTPATIENT
Start: 2022-12-09 | End: 2022-12-11 | Stop reason: HOSPADM

## 2022-12-08 RX ADMIN — IOHEXOL 85 ML: 350 INJECTION, SOLUTION INTRAVENOUS at 15:58

## 2022-12-08 RX ADMIN — INSULIN LISPRO 1 UNITS: 100 INJECTION, SOLUTION INTRAVENOUS; SUBCUTANEOUS at 22:27

## 2022-12-08 NOTE — ASSESSMENT & PLAN NOTE
No results found for: CHOLESTEROL, TRIG, HDL, LDLCALC    · Statin per Stroke/TIA pathway  · Recheck Lipid panel

## 2022-12-08 NOTE — ED PROVIDER NOTES
History  Chief Complaint   Patient presents with   • Facial Numbness     Numbness to entire face and b/l legs, also had a resolved period of confusion this am when she couldn't remember a coworkers name, unable to focus mentally      Is a 42-year-old female patient with history of hypertension hyperlipidemia and prior history of breast cancer presenting for evaluation of facial numbness and expressive aphasia  She states she was at work earlier this morning when she was documenting and suddenly cannot think of her coworkers names that she had to write down onto a paper  She states she could picture in her head but was unable to speak or write it  She developed bilateral facial numbness  Symptoms lasted for about 1 hour before resolving  She now states she feels like she is back to normal   She has no history of similar symptoms  She denies any recent head injuries  No nausea or vomiting  No vertigo/dizziness  History provided by:  Patient   used: No    CVA/TIA-like Symptoms  Presenting symptoms: headaches, language symptoms and sensory loss    Onset quality:  Sudden  Duration:  1 hour  Timing:  Constant  Progression:  Resolved  Similar to previous episodes: no    Associated symptoms: no chest pain, no fever, no seizures and no vomiting        Prior to Admission Medications   Prescriptions Last Dose Informant Patient Reported? Taking?    Dapagliflozin Propanediol (FARXIGA) 10 MG TABS   Yes No   Sig: Take by mouth daily   acetaminophen (TYLENOL) 325 mg tablet   No No   Sig: Take 2 tablets (650 mg total) by mouth every 6 (six) hours as needed for mild pain or fever   ascorbic acid (VITAMIN C) 500 MG tablet   No No   Sig: Take 1 tablet (500 mg total) by mouth daily for 15 days   atorvastatin (LIPITOR) 20 mg tablet   Yes No   Sig: Take 20 mg by mouth daily   dextromethorphan-guaiFENesin (ROBITUSSIN DM)  mg/5 mL syrup   No No   Sig: Take 10 mL by mouth every 4 (four) hours as needed for cough   glimepiride (AMARYL) 2 mg tablet   Yes No   Sig: Take 2 mg by mouth every morning before breakfast   irbesartan (AVAPRO) 75 mg tablet   Yes No   Sig: Take 75 mg by mouth daily at bedtime   metFORMIN (GLUCOPHAGE) 1000 MG tablet   Yes No   Sig: Take 1,000 mg by mouth 2 (two) times a day with meals   oxyCODONE-acetaminophen (PERCOCET) 5-325 mg per tablet   No No   Sig: Take 1 tablet by mouth every 4 (four) hours as needed for moderate pain for up to 10 dosesMax Daily Amount: 6 tablets   Patient not taking: Reported on 4/1/2020   tamoxifen (NOLVADEX) 20 mg tablet   Yes No   Sig: Take 20 mg by mouth 2 (two) times a day      Facility-Administered Medications: None       Past Medical History:   Diagnosis Date   • Cancer ([facility] )     breast   • Diabetes mellitus ([facility] )    • Hypertension        Past Surgical History:   Procedure Laterality Date   • DILATION AND CURETTAGE OF UTERUS     • LYMPHADENECTOMY         No family history on file  I have reviewed and agree with the history as documented  E-Cigarette/Vaping     E-Cigarette/Vaping Substances     Social History     Tobacco Use   • Smoking status: Never   • Smokeless tobacco: Never   Substance Use Topics   • Alcohol use: Yes     Comment: socially   • Drug use: No       Review of Systems   Constitutional: Negative for chills and fever  HENT: Negative for ear pain and sore throat  Eyes: Negative for pain and visual disturbance  Respiratory: Negative for cough and shortness of breath  Cardiovascular: Negative for chest pain and palpitations  Gastrointestinal: Negative for abdominal pain and vomiting  Genitourinary: Negative for dysuria and hematuria  Musculoskeletal: Negative for arthralgias and back pain  Skin: Negative for color change and rash  Neurological: Positive for speech difficulty, numbness and headaches  Negative for seizures and syncope  All other systems reviewed and are negative        Physical Exam  Physical Exam  Vitals and nursing note reviewed  Constitutional:       General: She is not in acute distress  Appearance: She is well-developed  HENT:      Head: Normocephalic and atraumatic  Eyes:      Conjunctiva/sclera: Conjunctivae normal    Cardiovascular:      Rate and Rhythm: Normal rate and regular rhythm  Heart sounds: No murmur heard  Pulmonary:      Effort: Pulmonary effort is normal  No respiratory distress  Breath sounds: Normal breath sounds  Abdominal:      Palpations: Abdomen is soft  Tenderness: There is no abdominal tenderness  Musculoskeletal:         General: No swelling  Cervical back: Neck supple  Skin:     General: Skin is warm and dry  Capillary Refill: Capillary refill takes less than 2 seconds  Neurological:      Mental Status: She is alert  Comments: GCS 15  AAOx3  No focal neuro deficits  CN II-XII grossly intact  Speech normal, no aphasia or dysarthria  No pronator drift  Cerebellar function normal  Finger to nose normal  PERRL  EOMI  Peripheral vision intact  No nystagmus  Upper and lower extremity strength 5/5 through   strength 5/5 b/l  Gross sensation intact b/l        Psychiatric:         Mood and Affect: Mood normal          Vital Signs  ED Triage Vitals [12/08/22 1450]   Temperature Pulse Respirations Blood Pressure SpO2   97 6 °F (36 4 °C) 95 16 158/96 100 %      Temp Source Heart Rate Source Patient Position - Orthostatic VS BP Location FiO2 (%)   Tympanic Monitor Sitting Right arm --      Pain Score       --           Vitals:    12/08/22 1450   BP: 158/96   Pulse: 95   Patient Position - Orthostatic VS: Sitting         Visual Acuity  Visual Acuity    Flowsheet Row Most Recent Value   L Pupil Size (mm) 4   R Pupil Size (mm) 4          ED Medications  Medications   iohexol (OMNIPAQUE) 350 MG/ML injection (SINGLE-DOSE) 85 mL (85 mL Intravenous Given 12/8/22 4328)       Diagnostic Studies  Results Reviewed     Procedure Component Value Units Date/Time HS Troponin I 2hr [[id number]] Collected: 12/08/22 1655    Lab Status:  In process Specimen: Blood from Arm, Left Updated: 12/08/22 1657    HS Troponin I 4hr [[id number]]     Lab Status: No result Specimen: Blood     HS Troponin 0hr (reflex protocol) [[id number]]  (Normal) Collected: 12/08/22 1451    Lab Status: Final result Specimen: Blood from Arm, Right Updated: 12/08/22 1542     hs TnI 0hr <2 ng/L     Comprehensive metabolic panel [[id number]]  (Abnormal) Collected: 12/08/22 1451    Lab Status: Final result Specimen: Blood from Arm, Right Updated: 12/08/22 1536     Sodium 139 mmol/L      Potassium 5 1 mmol/L      Chloride 101 mmol/L      CO2 27 mmol/L      ANION GAP 11 mmol/L      BUN 11 mg/dL      Creatinine 0 54 mg/dL      Glucose 178 mg/dL      Calcium 9 9 mg/dL      AST 18 U/L      ALT 26 U/L      Alkaline Phosphatase 73 U/L      Total Protein 8 1 g/dL      Albumin 4 3 g/dL      Total Bilirubin 0 66 mg/dL      eGFR 109 ml/min/1 73sq m     Narrative:      [place] guidelines for Chronic Kidney Disease (CKD):   •  Stage 1 with normal or high GFR (GFR > 90 mL/min/1 73 square meters)  •  Stage 2 Mild CKD (GFR = 60-89 mL/min/1 73 square meters)  •  Stage 3A Moderate CKD (GFR = 45-59 mL/min/1 73 square meters)  •  Stage 3B Moderate CKD (GFR = 30-44 mL/min/1 73 square meters)  •  Stage 4 Severe CKD (GFR = 15-29 mL/min/1 73 square meters)  •  Stage 5 End Stage CKD (GFR <15 mL/min/1 73 square meters)  Note: GFR calculation is accurate only with a steady state creatinine    CBC and differential [[id number]]  (Abnormal) Collected: 12/08/22 1451    Lab Status: Final result Specimen: Blood from Arm, Right Updated: 12/08/22 1500     WBC 5 70 Thousand/uL      RBC 4 95 Million/uL      Hemoglobin 14 7 g/dL      Hematocrit 43 5 %      MCV 88 fL      MCH 29 7 pg      MCHC 33 8 g/dL      RDW 11 4 %      MPV 9 4 fL      Platelets 587 Thousands/uL      nRBC 0 /100 WBCs      Neutrophils Relative 58 %      Immat GRANS % 0 %      Lymphocytes Relative 33 %      Monocytes Relative 7 %      Eosinophils Relative 1 %      Basophils Relative 1 %      Neutrophils Absolute 3 31 Thousands/µL      Immature Grans Absolute 0 01 Thousand/uL      Lymphocytes Absolute 1 86 Thousands/µL      Monocytes Absolute 0 41 Thousand/µL      Eosinophils Absolute 0 05 Thousand/µL      Basophils Absolute 0 06 Thousands/µL                  CTA head and neck with and without contrast   Final Result by Gary Thrasher MD (12/08 1649)      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)  Cervical right ICA is asymmetrically smaller likely due to central stenosis  No other intracranial stenosis, large vessel occlusion or aneurysm  No significant stenosis of the cervical carotid or vertebral arteries  Mild chronic microangiopathy  No acute infarct, hemorrhage or mass effect  Borderline prominent pituitary gland, suggest nonemergent follow-up with MRI brain and sella  The study was marked in [facility] for immediate notification  Workstation performed: [id number]                    Procedures  Procedures         ED Course  ED Course as of 12/08/22 1703   Thu Dec 08, 2022   1543 hs TnI 0hr: <2             HEART Risk Score    Flowsheet Row Most Recent Value   Heart Score Risk Calculator    History 0 Filed at: 12/08/2022 1701   ECG 1 Filed at: 12/08/2022 1701   Age 1 Filed at: 12/08/2022 1701   Risk Factors 1 Filed at: 12/08/2022 1701   Troponin 0 Filed at: 12/08/2022 1701   HEART Score 3 Filed at: 12/08/2022 1701                                      MDM  Number of Diagnoses or Management Options  Expressive aphasia: new and requires workup  Diagnosis management comments: DDX including but not limited to: TIA, CVA, metabolic abnormality, cardiac etiology, atypical migraine, seizure, tumor, thyroid disease          Amount and/or Complexity of Data Reviewed  Clinical lab tests: ordered and reviewed  Tests in the radiology section of CPT®: ordered and reviewed  Independent visualization of images, tracings, or specimens: yes    Risk of Complications, Morbidity, and/or Mortality  Presenting problems: moderate  Management options: low  General comments: 48 yo female with resolved expressive aphasia  CTA with severe stenosis of right ICA  No acute infarct  Will admit for stroke workup  Pt in agreement  Stable at time of admission  Patient Progress  Patient progress: stable      Disposition  Final diagnoses:   Expressive aphasia - resolved     Time reflects when diagnosis was documented in both MDM as applicable and the Disposition within this note     Time User Action Codes Description Comment    12/8/2022  5:00 PM Nick Mckeon Add [R47 01] Expressive aphasia     12/8/2022  5:00 PM Nick Mckeon Modify [Z83 11] Expressive aphasia resolved      ED Disposition     ED Disposition   Admit    Condition   Stable    Date/Time   u Dec 8, 2022  5:01 PM    Comment   Case was discussed with Dr Prateek Rosenthal and the patient's admission status was agreed to be Admission Status: observation status to the service of Dr Prateek Rosenthal   Admission Status: observation status         Follow-up Information    None         Patient's Medications   Discharge Prescriptions    No medications on file       No discharge procedures on file      PDMP Review     None          ED Provider  Electronically Signed by           Kannan Shah PA-C  12/08/22 7614

## 2022-12-08 NOTE — ASSESSMENT & PLAN NOTE
Lab Results   Component Value Date    HGBA1C 9 1 (H) 11/01/2022       No results for input(s): POCGLU in the last 72 hours      Blood Sugar Average: Last 72 hrs:     Home Regimen: Metformin, Amaryl, Trulicity, Farxiga    Plan:  • Hold oral antihyperglycemics  • Diet Consistent CHO Level 2 (5 CHO servings/75g CHO per meal)  • Insulin regimen  o Glucose checks and Insulin correction ACHS  • Goal -180 while admitted, adjusting insulin regimen as appropriate  • Monitor for hypoglycemia and treat per protocol

## 2022-12-08 NOTE — ASSESSMENT & PLAN NOTE
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)  Cervical right ICA is asymmetrically smaller likely due to central stenosis  No other intracranial stenosis, large vessel occlusion or aneurysm  No significant stenosis of the cervical carotid or vertebral arteries  Mild chronic microangiopathy  No acute infarct, hemorrhage or mass effect  Borderline prominent pituitary gland, suggest nonemergent follow-up with MRI brain and sella      Plan - Stroke pathway:  · MRI brain, Transthoracic Echocardiogram, monitor on telemetry  · Lipid Panel, HbA1c  · Atorvastatin 40 mg q d , Aspirin 81 q d    · Consult Neurology  · May also need Vascular surgery evaluation given CTA findings  · PT/OT/Speech eval  · Allow for permissive hypertension with -200 for 48 hours  · Neuro checks

## 2022-12-09 ENCOUNTER — APPOINTMENT (OUTPATIENT)
Dept: MRI IMAGING | Facility: HOSPITAL | Age: 52
End: 2022-12-09

## 2022-12-09 ENCOUNTER — APPOINTMENT (OUTPATIENT)
Dept: NON INVASIVE DIAGNOSTICS | Facility: HOSPITAL | Age: 52
End: 2022-12-09

## 2022-12-09 PROBLEM — I65.21 CAROTID STENOSIS, ASYMPTOMATIC, RIGHT: Status: ACTIVE | Noted: 2022-12-09

## 2022-12-09 PROBLEM — R40.4 TRANSIENT ALTERATION OF AWARENESS: Status: ACTIVE | Noted: 2022-12-09

## 2022-12-09 PROBLEM — R20.2 PARESTHESIAS: Status: ACTIVE | Noted: 2022-12-09

## 2022-12-09 LAB
ALBUMIN SERPL BCP-MCNC: 3.7 G/DL (ref 3.5–5)
ALP SERPL-CCNC: 63 U/L (ref 46–116)
ALT SERPL W P-5'-P-CCNC: 26 U/L (ref 12–78)
ANION GAP SERPL CALCULATED.3IONS-SCNC: 12 MMOL/L (ref 4–13)
AORTIC ROOT: 2 CM
APICAL FOUR CHAMBER EJECTION FRACTION: 79 %
ASCENDING AORTA: 2.8 CM
AST SERPL W P-5'-P-CCNC: 18 U/L (ref 5–45)
BASOPHILS # BLD AUTO: 0.04 THOUSANDS/ÂΜL (ref 0–0.1)
BASOPHILS NFR BLD AUTO: 1 % (ref 0–1)
BILIRUB SERPL-MCNC: 0.65 MG/DL (ref 0.2–1)
BUN SERPL-MCNC: 14 MG/DL (ref 5–25)
CALCIUM SERPL-MCNC: 9.4 MG/DL (ref 8.3–10.1)
CHLORIDE SERPL-SCNC: 102 MMOL/L (ref 96–108)
CHOLEST SERPL-MCNC: 180 MG/DL
CO2 SERPL-SCNC: 27 MMOL/L (ref 21–32)
CREAT SERPL-MCNC: 0.51 MG/DL (ref 0.6–1.3)
E WAVE DECELERATION TIME: 238 MS
EOSINOPHIL # BLD AUTO: 0.07 THOUSAND/ÂΜL (ref 0–0.61)
EOSINOPHIL NFR BLD AUTO: 2 % (ref 0–6)
ERYTHROCYTE [DISTWIDTH] IN BLOOD BY AUTOMATED COUNT: 11.4 % (ref 11.6–15.1)
FRACTIONAL SHORTENING: 28 % (ref 28–44)
GFR SERPL CREATININE-BSD FRML MDRD: 111 ML/MIN/1.73SQ M
GLUCOSE P FAST SERPL-MCNC: 173 MG/DL (ref 65–99)
GLUCOSE SERPL-MCNC: 133 MG/DL (ref 65–140)
GLUCOSE SERPL-MCNC: 144 MG/DL (ref 65–140)
GLUCOSE SERPL-MCNC: 173 MG/DL (ref 65–140)
GLUCOSE SERPL-MCNC: 228 MG/DL (ref 65–140)
GLUCOSE SERPL-MCNC: 239 MG/DL (ref 65–140)
HCT VFR BLD AUTO: 43 % (ref 34.8–46.1)
HDLC SERPL-MCNC: 73 MG/DL
HGB BLD-MCNC: 14.2 G/DL (ref 11.5–15.4)
IMM GRANULOCYTES # BLD AUTO: 0.01 THOUSAND/UL (ref 0–0.2)
IMM GRANULOCYTES NFR BLD AUTO: 0 % (ref 0–2)
INTERVENTRICULAR SEPTUM IN DIASTOLE (PARASTERNAL SHORT AXIS VIEW): 1.1 CM
INTERVENTRICULAR SEPTUM: 1.1 CM (ref 0.6–1.1)
LAAS-AP2: 7.2 CM2
LAAS-AP4: 11.6 CM2
LDLC SERPL CALC-MCNC: 78 MG/DL (ref 0–100)
LEFT ATRIUM AREA SYSTOLE SINGLE PLANE A4C: 12.1 CM2
LEFT ATRIUM SIZE: 3.1 CM
LEFT INTERNAL DIMENSION IN SYSTOLE: 2.3 CM (ref 2.1–4)
LEFT VENTRICULAR INTERNAL DIMENSION IN DIASTOLE: 3.2 CM (ref 3.5–6)
LEFT VENTRICULAR POSTERIOR WALL IN END DIASTOLE: 1.1 CM
LEFT VENTRICULAR STROKE VOLUME: 22 ML
LVSV (TEICH): 22 ML
LYMPHOCYTES # BLD AUTO: 1.82 THOUSANDS/ÂΜL (ref 0.6–4.47)
LYMPHOCYTES NFR BLD AUTO: 42 % (ref 14–44)
MCH RBC QN AUTO: 29.2 PG (ref 26.8–34.3)
MCHC RBC AUTO-ENTMCNC: 33 G/DL (ref 31.4–37.4)
MCV RBC AUTO: 89 FL (ref 82–98)
MONOCYTES # BLD AUTO: 0.31 THOUSAND/ÂΜL (ref 0.17–1.22)
MONOCYTES NFR BLD AUTO: 7 % (ref 4–12)
MV E'TISSUE VEL-SEP: 5 CM/S
MV PEAK A VEL: 0.7 M/S
MV PEAK E VEL: 48 CM/S
MV STENOSIS PRESSURE HALF TIME: 69 MS
MV VALVE AREA P 1/2 METHOD: 3.19 CM2
NEUTROPHILS # BLD AUTO: 2.06 THOUSANDS/ÂΜL (ref 1.85–7.62)
NEUTS SEG NFR BLD AUTO: 48 % (ref 43–75)
NRBC BLD AUTO-RTO: 0 /100 WBCS
PLATELET # BLD AUTO: 244 THOUSANDS/UL (ref 149–390)
PMV BLD AUTO: 9.6 FL (ref 8.9–12.7)
POTASSIUM SERPL-SCNC: 3.9 MMOL/L (ref 3.5–5.3)
PROT SERPL-MCNC: 7.1 G/DL (ref 6.4–8.4)
RBC # BLD AUTO: 4.86 MILLION/UL (ref 3.81–5.12)
RIGHT ATRIUM AREA SYSTOLE A4C: 10.2 CM2
RIGHT VENTRICLE ID DIMENSION: 3 CM
SL CV LEFT ATRIUM LENGTH A2C: 3.3 CM
SL CV LV EF: 60
SL CV PED ECHO LEFT VENTRICLE DIASTOLIC VOLUME (MOD BIPLANE) 2D: 40 ML
SL CV PED ECHO LEFT VENTRICLE SYSTOLIC VOLUME (MOD BIPLANE) 2D: 18 ML
SODIUM SERPL-SCNC: 141 MMOL/L (ref 135–147)
TR MAX PG: 24 MMHG
TR PEAK VELOCITY: 2.5 M/S
TRICUSPID VALVE PEAK REGURGITATION VELOCITY: 2.45 M/S
TRIGL SERPL-MCNC: 143 MG/DL
TSH SERPL DL<=0.05 MIU/L-ACNC: 1.51 UIU/ML (ref 0.45–4.5)
WBC # BLD AUTO: 4.31 THOUSAND/UL (ref 4.31–10.16)

## 2022-12-09 RX ORDER — ATORVASTATIN CALCIUM 20 MG/1
20 TABLET, FILM COATED ORAL EVERY EVENING
Status: DISCONTINUED | OUTPATIENT
Start: 2022-12-09 | End: 2022-12-11 | Stop reason: HOSPADM

## 2022-12-09 RX ADMIN — HEPARIN SODIUM 5000 UNITS: 5000 INJECTION INTRAVENOUS; SUBCUTANEOUS at 22:17

## 2022-12-09 RX ADMIN — ASPIRIN 81 MG: 81 TABLET, CHEWABLE ORAL at 09:40

## 2022-12-09 RX ADMIN — ATORVASTATIN CALCIUM 20 MG: 20 TABLET, FILM COATED ORAL at 18:24

## 2022-12-09 RX ADMIN — HEPARIN SODIUM 5000 UNITS: 5000 INJECTION INTRAVENOUS; SUBCUTANEOUS at 15:55

## 2022-12-09 RX ADMIN — HEPARIN SODIUM 5000 UNITS: 5000 INJECTION INTRAVENOUS; SUBCUTANEOUS at 05:14

## 2022-12-09 RX ADMIN — INSULIN LISPRO 2 UNITS: 100 INJECTION, SOLUTION INTRAVENOUS; SUBCUTANEOUS at 22:17

## 2022-12-09 RX ADMIN — INSULIN LISPRO 2 UNITS: 100 INJECTION, SOLUTION INTRAVENOUS; SUBCUTANEOUS at 16:10

## 2022-12-09 RX ADMIN — GADOBUTROL 5 ML: 604.72 INJECTION INTRAVENOUS at 22:20

## 2022-12-09 NOTE — ASSESSMENT & PLAN NOTE
Blood Pressure: 110/72    Losartan 25 mg while inpatient and resume home irbesartan 75 mg on discharge

## 2022-12-09 NOTE — PROGRESS NOTES
Spiritual Care Progress Note    2022  Patient: Sylvester Pittman : 1970  Admission Date & Time: 2022 1510  MRN: [id number] CSN: [id number]       visited patient during unit rounds   introduced self and role, facilitated storytelling, explored meaningful relationships in pt's life, normalized pt's concerns, and provided patient with prayer and sacred text per request  Patient is a member at [address] [place] [address] of [facility] and is supported by her family members and coworkers at [employer]  Patient in good spirits and thanked  for providing support  Spiritual care will remain available  Chaplaincy Interventions Utilized:   Empowerment: Encouraged self-care, Normalized experience of patient/family, and Provided chaplaincy education    Exploration: Explored spiritual needs & resources and Facilitated story telling    Collaboration: Consulted with interdisciplinary team    Relationship Building: Cultivated a relationship of care and support    Ritual: Provided prayer and Provided Sabianism resources      Chaplaincy Outcomes Achieved:  Distress reduced, Expressed gratitude, and Expressed intermediate hope      Spiritual Coping Strategies Utilized:   Spiritual comfort     22 1200   Clinical Encounter Type   Visited With Patient   Routine Visit Introduction   Crisis Visit Critical Care   Druze Encounters   Druze Needs Prayer; [facility] Text

## 2022-12-09 NOTE — ASSESSMENT & PLAN NOTE
Lab Results   Component Value Date    CHOLESTEROL 180 12/08/2022    TRIG 143 12/08/2022    HDL 73 12/08/2022    LDLCALC 78 12/08/2022       · Statin per Stroke/TIA pathway  · Lipid panel was within normal limits

## 2022-12-09 NOTE — ASSESSMENT & PLAN NOTE
intermittent numbness of her entire face and numbness/tingling of both legs (L>R)  Mri c-spine pending

## 2022-12-09 NOTE — ASSESSMENT & PLAN NOTE
Lab Results   Component Value Date    HGBA1C 9 1 (H) 11/01/2022       Recent Labs     12/08/22  1849 12/08/22  2208 12/09/22  0840 12/09/22  1216   POCGLU 147* 158* 144* 133       Blood Sugar Average: Last 72 hrs:  (P) 145 5     Goal euglycemic  Management per primary service

## 2022-12-09 NOTE — ASSESSMENT & PLAN NOTE
· Present on admission  · Stroke work up negative for acute CVA  · Did have severe R ICA stenosis, vascular surgery consulted and recommending outpatient follow up with carotid doppler  · Back to baseline, neurology following, see recommendations

## 2022-12-09 NOTE — PHYSICAL THERAPY NOTE
Physical Therapy Evaluation     Patient's Name: Mikaela Kirby [facility]    Admitting Diagnosis  Facial numbness [R20 0]  Expressive aphasia [R47 01]    Problem List  Patient Active Problem List   Diagnosis    SIRS (systemic inflammatory response syndrome) ([facility] )    Hypertension    Hyperlipidemia    Viral respiratory infection    Type 2 diabetes mellitus, without long-term current use of insulin ([place] [address] )    COVID-19 virus detected    TIA (transient ischemic attack)       Past Medical History  Past Medical History:   Diagnosis Date    Cancer ([facility] )     breast    Diabetes mellitus ([place] [address] )     Hypertension        Past Surgical History  Past Surgical History:   Procedure Laterality Date    DILATION AND CURETTAGE OF UTERUS      LYMPHADENECTOMY          12/09/22 1226   PT Last Visit   PT Visit Date 12/09/22   Note Type   Note type Evaluation   Pain Assessment   Pain Assessment Tool 0-10   Pain Score No Pain   Restrictions/Precautions   Weight Bearing Precautions Per Order No   Braces or Orthoses Other (Comment)  (none reported)   Other Precautions Telemetry   Home Living   Type [address] Two level;1/2 bath on main level;Bed/bath upstairs  (0 NILO, FOS to bed/bath)   Bathroom Shower/Tub Walk-in shower   Bathroom Toilet Standard   Bathroom Equipment Shower chair;Grab bars in [facility] [address];Cane;Wheelchair-manual   Additional Comments Ambulates without device at baseline   Prior Function   Level of [place] Independent with functional mobility; Independent with ADLs; Independent with IADLS   Lives With Spouse; Other (Comment)  (2 dogs)   Receives Help From Family   IADLs Independent with driving; Independent with meal prep; Independent with medication management   Falls in the last 6 months 0   Vocational Full time [place]  [place] [employer] [facility] [place] [facility] [place]   General   Family/Caregiver Present No   Cognition   Overall Cognitive Status [facility]   Arousal/Participation Alert   Orientation Level Oriented X4   Memory Within functional limits   Following Commands Follows all commands and directions without difficulty   Comments Pt agreeable to PT   RLE Assessment   RLE Assessment WFL   LLE Assessment   LLE Assessment X  (intermittent numbness reported throughout LLE)   Vision-Basic Assessment   Current Vision Wears contacts   Light Touch   RLE Light Touch Grossly intact   LLE Light Touch Grossly intact   Bed Mobility   Additional Comments Pt received at start of session supine in bed with HOB elevated  Following session, pt returned to recliner and remained with needs met, alarm activated and call bell within reach   Transfers   Sit to Stand 7  Independent   Stand to Sit 7  Independent   Additional Comments without device   Ambulation/Elevation   Gait pattern Inconsistent ashlyn; Short stride   Gait Assistance 7  Independent   Assistive Device None   Distance 120'   Stair Management Assistance 5  Supervision   Additional items Assist x 1;Verbal cues   Stair Management Technique One rail R;Alternating pattern   Number of Stairs 8   Balance   Static Sitting Good   Dynamic Sitting Fair +   Static Standing Fair +   Dynamic Standing Fair   Ambulatory Fair   Activity Tolerance   Activity Tolerance Patient tolerated treatment well   Medical Staff Made Aware Student OT Zaid Dawson 97   Nurse Made Aware ELICEO Plunkett   Assessment   Prognosis Good   Problem List Impaired sensation   Assessment Pt is 47 y o  female seen for PT evaluation s/p admit to [facility] on 12/8/2022 w/ TIA (transient ischemic attack)  PT consulted to assess pt's functional mobility and d/c needs  Order placed for PT eval and tx, w/ up w/ A order  Comorbidities affecting pt's physical performance at time of assessment include: TIA, type 2 DM, HLD, HTN   PTA, pt was independent w/ all functional mobility w/ no device, ambulates community distances and elevations, ambulates household distances, lives w/ spouse in two level house and works full time  Personal factors affecting pt at time of IE include: lives in 2 story house  Please find objective findings from PT assessment regarding body systems outlined above  The following objective measures performed on IE: Barthel Index: 90/100, Modified Eakly: 1 (no significant disability) and AM-PAC 6-Clicks: 71/28  Pt's clinical presentation is currently stable  seen in pt's presentation of continued need for medical management and monitoring, impaired sensation  From PT/mobility standpoint, pt appears to be functioning close to or at mobility baseline, therefore no further immediate skilled PT needs are warranted at this time  Pt currently performing all phases of functional mobility at independent level without need for AD  Recommend pt continue to mobilize ad gómez while here  Recommend pt return to previous living environment once medically cleared and with continued family support  Will sign off, D/C PT  Please reconsult if further immediate skilled PT needs are warranted     Barriers to Discharge None   Recommendation   PT Discharge Recommendation No rehabilitation needs   AM-PAC Basic Mobility Inpatient   Turning in Bed Without Bedrails 4   Lying on Back to Sitting on Edge of Flat Bed 4   Moving Bed to Chair 4   Standing Up From Chair 4   Walk in Room 4   Climb 3-5 Stairs 3   Basic Mobility Inpatient Raw Score 23   Basic Mobility Standardized Score 50 88   Highest Level Of Mobility   JH-HLM Goal 7: Walk 25 feet or more   JH-HLM Achieved 7: Walk 25 feet or more   Modified [place]ak[place] Scale   Modified [place] Scale 1   Barthel Index   Feeding 10   Bathing 0   Grooming Score 5   Dressing Score 10   Bladder Score 10   Bowels Score 10   Toilet Use Score 10   Transfers (Bed/Chair) Score 15   Mobility (Level Surface) Score 15   Stairs Score 5   Barthel Index Score 90       Danette Hatch, PT

## 2022-12-09 NOTE — PROGRESS NOTES
[facility]  Progress Note - Joseph Lofton 1970, 47 y o  female MRN: [id number]  Unit/Bed#:  Encounter: [id number]  Primary Care Provider: El Joy DO   Date and time admitted to hospital: 12/8/2022  3:10 PM    * TIA (transient ischemic attack)  Assessment & Plan  Episode of inability to remember names of people at work  Numbness of face with no evidence of droop  Symptoms lasted for approximately 1-2 hours    Blood Pressure: 110/72  · EKG on presentation to the ER showed NSR  · ABCD Score: 5; Moderate risk of stroke risk after TIA    CTA head and neck with and without contrast - 12/8/2022 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)  Cervical right ICA is asymmetrically smaller likely due to central stenosis  No other intracranial stenosis, large vessel occlusion or aneurysm  No significant stenosis of the cervical carotid or vertebral arteries  Mild chronic microangiopathy  No acute infarct, hemorrhage or mass effect  Borderline prominent pituitary gland, suggest nonemergent follow-up with MRI brain and sella      Plan - Stroke pathway:  · MRI brain,  No acute ischemia, possible lesion on pituitary  · Transthoracic Echocardiogram showed LVEF of 65%, diastolic function was normal, structurally normal,   · monitor on telemetry  · Lipid Panel was normal  · Pending hemoglobin A1c  · Atorvastatin 40 mg q d , Aspirin 81 q d    · Consult Neurology, awaiting input  · May also need Vascular surgery evaluation given CTA findings  · PT/OT/speech all have no further recommendations and have signed off  · Neuro checks      Hypertension  Assessment & Plan  Blood Pressure: 110/72    Losartan 25 mg while inpatient and resume home irbesartan 75 mg on discharge    Hyperlipidemia  Assessment & Plan  Lab Results   Component Value Date    CHOLESTEROL 180 12/08/2022    TRIG 143 12/08/2022    HDL 73 12/08/2022    LDLCALC 78 12/08/2022 · Statin per Stroke/TIA pathway  · Lipid panel was within normal limits    Type 2 diabetes mellitus, without long-term current use of insulin [facility])  Assessment & Plan  Lab Results   Component Value Date    HGBA1C 9 1 (H) 2022       Recent Labs     22  1849 22  2208 22  0840 22  1216   POCGLU 147* 158* 144* 133       Blood Sugar Average: Last 72 hrs:  (P) 145 5   Home Regimen: Metformin, Amaryl, Trulicity, Farxiga    Plan:  • Hold oral antihyperglycemics  • Diet Consistent CHO Level 2 (5 CHO servings/75g CHO per meal)  • Insulin regimen  o Glucose checks and Insulin correction ACHS  • Goal -180 while admitted, adjusting insulin regimen as appropriate  • Monitor for hypoglycemia and treat per protocol          VTE Pharmacologic Prophylaxis:   Moderate Risk (Score 3-4) - Pharmacological DVT Prophylaxis Ordered: heparin  Patient Centered Rounds: I performed bedside rounds with nursing staff today  Discussions with Specialists or Other Care Team Provider: Neurology    Education and Discussions with Family / Patient: Patient declined call to   Time Spent for Care: 30 minutes  More than 50% of total time spent on counseling and coordination of care as described above  Current Length of Stay: 0 day(s)  Current Patient Status: Observation   Certification Statement: The patient, admitted on an observation basis, will now require > 2 midnight hospital stay due to Recent TIA  Discharge Plan: Anticipate discharge in 24-48 hrs to home  Code Status: Level 3 - DNAR and DNI    Subjective:   Patient reported to have intermittent facial numbness and tingling  She denies any lightheadedness, shortness of breath, palpitations, weakness, facial droop, or any numbness and tingling throughout any other part of the body      Objective:     Vitals:   Temp (24hrs), Av 7 °F (36 5 °C), Min:97 4 °F (36 3 °C), Max:97 8 °F (36 6 °C)    Temp:  [97 4 °F (36 3 °C)-97 8 °F (36 6 °C)] 97 8 °F (36 6 °C)  HR:  [] 100  Resp:  [12-25] 25  BP: ()/(56-87) 110/72  SpO2:  [96 %-100 %] 96 %  Body mass index is 19 22 kg/m²  Input and Output Summary (last 24 hours):   No intake or output data in the 24 hours ending 12/09/22 1540    Physical Exam:   Physical Exam  Vitals and nursing note reviewed  Constitutional:       Appearance: She is normal weight  HENT:      Head: Normocephalic  Nose: Nose normal       Mouth/Throat:      Mouth: Mucous membranes are moist       Pharynx: Oropharynx is clear  Eyes:      General: No scleral icterus  Conjunctiva/sclera: Conjunctivae normal       Pupils: Pupils are equal, round, and reactive to light  Cardiovascular:      Rate and Rhythm: Normal rate and regular rhythm  Heart sounds: No murmur heard  No friction rub  No gallop  Pulmonary:      Effort: Pulmonary effort is normal  No respiratory distress  Breath sounds: Normal breath sounds  No stridor  No wheezing, rhonchi or rales  Abdominal:      General: Abdomen is flat  Palpations: Abdomen is soft  Musculoskeletal:         General: Normal range of motion  Cervical back: Normal range of motion and neck supple  Right lower leg: No edema  Left lower leg: No edema  Lymphadenopathy:      Cervical: No cervical adenopathy  Skin:     General: Skin is warm  Coloration: Skin is not jaundiced or pale  Findings: No bruising, erythema or lesion  Neurological:      General: No focal deficit present  Mental Status: She is alert and oriented to person, place, and time  Mental status is at baseline  Cranial Nerves: No cranial nerve deficit  Motor: No weakness     Psychiatric:      Comments: Irritable          Additional Data:     Labs:  Results from last 7 days   Lab Units 12/09/22  0514   WBC Thousand/uL 4 31   HEMOGLOBIN g/dL 14 2   HEMATOCRIT % 43 0   PLATELETS Thousands/uL 244   NEUTROS PCT % 48   LYMPHS PCT % 42   MONOS PCT % 7 EOS PCT % 2     Results from last 7 days   Lab Units 12/09/22  0514   SODIUM mmol/L 141   POTASSIUM mmol/L 3 9   CHLORIDE mmol/L 102   CO2 mmol/L 27   BUN mg/dL 14   CREATININE mg/dL 0 51*   ANION GAP mmol/L 12   CALCIUM mg/dL 9 4   ALBUMIN g/dL 3 7   TOTAL BILIRUBIN mg/dL 0 65   ALK PHOS U/L 63   ALT U/L 26   AST U/L 18   GLUCOSE RANDOM mg/dL 173*         Results from last 7 days   Lab Units 12/09/22  1216 12/09/22  0840 12/08/22  2208 12/08/22  1849   POC GLUCOSE mg/dl 133 144* 158* 147*               Lines/Drains:  Invasive Devices     Peripheral Intravenous Line  Duration           Peripheral IV 12/08/22 Left Antecubital <1 day                  Telemetry:  Telemetry Orders (From admission, onward)             24 Hour Telemetry Monitoring  Continuous x 24 Hours (Telem)        Expiring   References:    Telemetry Guidelines   Question:  Reason for 24 Hour Telemetry  Answer:  Acute CVA (>24 hrs old)                 Telemetry Reviewed: Normal Sinus Rhythm high 90s  Indication for Continued Telemetry Use: Acute CVA             Imaging: Reviewed radiology reports from this admission including: MRI brain    Recent Cultures (last 7 days):         Last 24 Hours Medication List:   Current Facility-Administered Medications   Medication Dose Route Frequency Provider Last Rate   • acetaminophen  650 mg Oral Q6H PRN Ludwig Solorzano DO     • aspirin  81 mg Oral Daily Ludwig Solorzano DO     • atorvastatin  40 mg Oral QPM Ludwig Solorzano DO     • heparin (porcine)  5,000 Units Subcutaneous Q8H [facility] halfway Ludwig Solorzano DO     • insulin lispro  1-5 Units Subcutaneous TID AC Ludwig Solorzano DO     • insulin lispro  1-5 Units Subcutaneous HS Ludwig Solorzano DO          Today, Patient Was Seen By: Magi Paz PA-C    **Please Note: This note may have been constructed using a voice recognition system  **

## 2022-12-09 NOTE — CONSULTS
Consultation - Neurology   Matthew Ash Ibarra 47 y o  female MRN: [id number]  Unit/Bed#:  Encounter: [id number]      Assessment/Plan     Transient alteration of awareness  Assessment & Plan  Brad Martino is a 47 y  o ambidextrous female with HTN, HLD, DM2, and history of breast cancer 2013 s/p lumpectomy and radiation who was sent to the ED 12/8/2022 by her PCP for further evaluation of transient episode of confusion and numbness  She was  in usual state of health yesterday morning  Went to work at [employer] and around [employer] attempted to make list of co-workers names but couldn't think of their names for a few minutes  Shortly there after she checked her BG which was reported as 172  She went home and developed intermittent numbness of her entire face and numbness/tingling of both legs (L>R) around 10am that lasted for a few seconds to minutes  She went to her PCP who referred her to the ED for further evaluation  Imaging:  CTH/CTA: IMPRESSION:  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)  Cervical right ICA is asymmetrically smaller likely due to central stenosis  No other intracranial stenosis, large vessel occlusion or aneurysm  No significant stenosis of the cervical carotid or vertebral arteries  Mild chronic microangiopathy  No acute infarct, hemorrhage or mass effect  Borderline prominent pituitary gland, suggest nonemergent follow-up with MRI brain and sella  MRI:IMPRESSION:  1  No acute ischemia  2   Sequela of old lacunar infarct in the right centrum semiovale  Mild microangiopathic change  3   Incidental 0 7 cm T1 hyperintense lesion within posterior pituitary gland, could be cystic microadenoma versus Rathke's cleft cyst   Recommend correlation with hormonal levels and follow-up with pituitary protocol brain MRI    Pertinent Labs:  Lipid panel: 180/143/73/78  ; A1c pending  Relevant outpatient meds:  Atorvastatin 20mg daily  Recommendations:  • Check MRI c-spine w/wo contrast  • Echo pending  • Hemoglobin A1c pending  • Check TSH, B12  • Continue Aspirin 81 mg daily  • Continue Atorvastatin 20 mg qhs  • Normotension  • Euglycemic, normothermic goal  • Continue telemetry  • PT/OT/ST  • Continue to monitor and notify neurology with any changes  - Medical management and supportive care per primary team  Correction of any metabolic or infectious disturbances    - vascular surgery consult re: severe stenosis right ICA  - outpatient follow up with PCP for work-up of incidental pituitary lesion    Carotid stenosis, asymptomatic, right  Assessment & Plan  CTA showed severe stenosis of 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)  Vascular surgery consult    Paresthesias  Assessment & Plan  intermittent numbness of her entire face and numbness/tingling of both legs (L>R)  Mri c-spine pending    Type 2 diabetes mellitus, without long-term current use of insulin [facility])  Assessment & Plan  Lab Results   Component Value Date    HGBA1C 9 1 (H) 11/01/2022       Recent Labs     12/08/22  1849 12/08/22  2208 12/09/22  0840 12/09/22  1216   POCGLU 147* 158* 144* 133       Blood Sugar Average: Last 72 hrs:  (P) 145 5     Goal euglycemic  Management per primary service    Hyperlipidemia  Assessment & Plan  Continue outpatient atorvastatin    Hypertension  Assessment & Plan  Goal normotension  Management per primary service    Dima Garcia will need follow up in in 6 weeks with general attending or advance practitioner  She will not require outpatient neurological testing  Reason for Consult / Principal Problem: "Dx:1  expressive aphasia  2 Facial numbness"  HPI: Dima Garcia is a 47 y  o ambidextrous female with HTN, HLD, DM2, and history of breast cancer 2013 s/p lumpectomy and radiation who was sent to the ED 12/8/2022 by her PCP for further evaluation of transient episode of confusion and numbness   She was  in usual state of health yesterday morning  Went to work at [employer] and around [employer] attempted to make list of co-workers names but couldn't think of their names for a few minutes  Shortly there after she checked her BG which was reported as 172  She went home and developed intermittent numbness of her entire face and numbness/tingling of both legs (L>R) around 10am that lasted for a few seconds to minutes  She went to her PCP who referred her to the ED for further evaluation  [facility]  CTA showed severe stenosis right ICA  She was started on asa 81mg daily and admitted on the stroke pathway pending additional work-up    Currently she continues to experience intermittent numbness primarily on her left leg at present  Mild left frontal headache, slight blurry vision without her contacts  She also C/O posterior neck "heaviness"  Denies dizziness, lightheadedness, sob, chest pain, nausea or vomiting  Inpatient consult to Neurology  Consult performed by: GEGE Hutchison  Consult ordered by: Luzmaria Mata DO        Review of Systems   See HPI    Historical Information   Past Medical History:   Diagnosis Date   • Cancer [facility])     breast   • Diabetes mellitus ([facility] [address] )    • Hypertension      Past Surgical History:   Procedure Laterality Date   • DILATION AND CURETTAGE OF UTERUS     • LYMPHADENECTOMY       Social History   Social History     Substance and Sexual Activity   Alcohol Use Yes    Comment: socially     Social History     Substance and Sexual Activity   Drug Use No     E-Cigarette/Vaping   • E-Cigarette Use Never User      E-Cigarette/Vaping Substances   • Nicotine No    • THC No    • CBD No    • Flavoring No    • Other No    • Unknown No      Social History     Tobacco Use   Smoking Status Never   Smokeless Tobacco Never     Family History: History reviewed  No pertinent family history  Review of previous medical records was completed       Meds/Allergies   current meds:   Current Facility-Administered Medications   Medication Dose Route Frequency   • acetaminophen (TYLENOL) tablet 650 mg  650 mg Oral Q6H PRN   • aspirin chewable tablet 81 mg  81 mg Oral Daily   • atorvastatin (LIPITOR) tablet 40 mg  40 mg Oral QPM   • heparin (porcine) subcutaneous injection 5,000 Units  5,000 Units Subcutaneous Q8H [address]   • insulin lispro (HumaLOG) 100 units/mL subcutaneous injection 1-5 Units  1-5 Units Subcutaneous TID AC   • insulin lispro (HumaLOG) 100 units/mL subcutaneous injection 1-5 Units  1-5 Units Subcutaneous HS    and PTA meds:   Prior to Admission Medications   Prescriptions Last Dose Informant Patient Reported? Taking? Dulaglutide (Trulicity) 4 53 ID/0 0II SOPN Past Week  Yes Yes   Sig: Inject 0 75 mg under the skin Once a week   Insulin Glargine-yfgn 100 UNIT/ML SOPN 12/8/2022  Yes Yes   Sig: Inject 10 Units under the skin in the morning Every 3rd day takes 12 units     acetaminophen (TYLENOL) 325 mg tablet More than a month  No No   Sig: Take 2 tablets (650 mg total) by mouth every 6 (six) hours as needed for mild pain or fever   ascorbic acid (VITAMIN C) 500 MG tablet   No No   Sig: Take 1 tablet (500 mg total) by mouth daily for 15 days   atorvastatin (LIPITOR) 20 mg tablet 12/8/2022  Yes Yes   Sig: Take 20 mg by mouth daily   dextromethorphan-guaiFENesin (ROBITUSSIN DM)  mg/5 mL syrup More than a month  No No   Sig: Take 10 mL by mouth every 4 (four) hours as needed for cough   ferrous sulfate 325 (65 Fe) mg tablet 12/8/2022  Yes Yes   Sig: Take 325 mg by mouth daily with breakfast   losartan (COZAAR) 25 mg tablet 12/8/2022  Yes Yes   Sig: Take 25 mg by mouth daily   metFORMIN (GLUCOPHAGE) 1000 MG tablet 12/8/2022  Yes Yes   Sig: Take 1,000 mg by mouth daily with dinner      Facility-Administered Medications: None       Allergies   Allergen Reactions   • Dapagliflozin      Facial swelling       Objective   Vitals:Blood pressure 110/72, pulse 100, temperature 97 8 °F (36 6 °C), temperature source Oral, resp  rate (!) 25, height 5' 4" (1 626 m), weight 50 8 kg (112 lb), last menstrual period 08/01/2022, SpO2 96 %  ,Body mass index is 19 22 kg/m²  No intake or output data in the 24 hours ending 12/09/22 1604    Invasive Devices: Invasive Devices     Peripheral Intravenous Line  Duration           Peripheral IV 12/08/22 Left Antecubital 1 day                Physical Exam  Vitals reviewed  Constitutional:       General: She is not in acute distress  HENT:      Head: Normocephalic and atraumatic  Pulmonary:      Effort: Pulmonary effort is normal    Skin:     General: Skin is warm and dry  Neurological:      Mental Status: She is alert and oriented to person, place, and time  Cranial Nerves: Cranial nerves 2-12 are intact  Coordination: Finger-Nose-Finger Test and Heel to [place] shaye Test normal       Deep Tendon Reflexes:      Reflex Scores:       Bicep reflexes are 2+ on the right side and 2+ on the left side  Brachioradialis reflexes are 2+ on the right side and 2+ on the left side  Patellar reflexes are 2+ on the right side and 2+ on the left side  Psychiatric:         Speech: Speech normal        Neurologic Exam     Mental Status   Oriented to person, place, and time  Attention: normal  Concentration: normal    Speech: speech is normal   Level of consciousness: alert  Able to name object  Able to read  Able to repeat  Cranial Nerves   Cranial nerves II through XII intact  Motor Exam     Strength   Strength 5/5 except as noted   Left hand grasp 5-/5     Sensory Exam   Light touch normal    Vibration normal    Pinprick normal      Gait, Coordination, and Reflexes     Coordination   Finger to nose coordination: normal  Heel to shin coordination: normal    Tremor   Resting tremor: absent    Reflexes   Right brachioradialis: 2+  Left brachioradialis: 2+  Right biceps: 2+  Left biceps: 2+  Right patellar: 2+  Left patellar: 2+  Right plantar: normal  Left plantar: normal      Lab Results:   CBC:   Results from last 7 days   Lab Units 12/09/22  0514 12/08/22  1451   WBC Thousand/uL 4 31 5 70   RBC Million/uL 4 86 4 95   HEMOGLOBIN g/dL 14 2 14 7   HEMATOCRIT % 43 0 43 5   MCV fL 89 88   PLATELETS Thousands/uL 244 265   , BMP/CMP:   Results from last 7 days   Lab Units 12/09/22  0514 12/08/22  1451   SODIUM mmol/L 141 139   POTASSIUM mmol/L 3 9 5 1   CHLORIDE mmol/L 102 101   CO2 mmol/L 27 27   BUN mg/dL 14 11   CREATININE mg/dL 0 51* 0 54*   CALCIUM mg/dL 9 4 9 9   AST U/L 18 18   ALT U/L 26 26   ALK PHOS U/L 63 73   EGFR ml/min/1 73sq m 111 109   , Vitamin B12:   , HgBA1C:   , TSH:   , Coagulation:   , Lipid Profile:   Results from last 7 days   Lab Units 12/08/22  1451   HDL mg/dL 73   LDL CALC mg/dL 78   TRIGLYCERIDES mg/dL 143   , Ammonia:   , Urinalysis:       Invalid input(s): URIBILINOGEN, Drug Screen:   , Medication Drug Levels:       Invalid input(s): CARBAMAZEPINE,  PHENOBARB, LACOSAMIDE, OXCARBAZEPINE     Imaging Studies: I have personally reviewed pertinent reports  and I have personally reviewed pertinent films in PACS     EKG, Pathology, and Other Studies: I have personally reviewed pertinent reports  Code Status: Level 3 - DNAR and DNI    Counseling / Coordination of Care  Assessment, images, and plan reviewed with Dr Alexia Schneider   Plan discussed with patient and primary service

## 2022-12-09 NOTE — UTILIZATION REVIEW
Initial Clinical Review    Admission: Date/Time/Statement:   Admission Orders (From admission, onward)     Ordered        12/08/22 1701  Place in Observation  Once                      Orders Placed This Encounter   Procedures   • Place in Observation     Standing Status:   Standing     Number of Occurrences:   1     Order Specific Question:   Level of Care     Answer:   Med Surg [16]     ED Arrival Information     Expected   -    Arrival   12/8/2022 14:39    Acuity   Urgent            Means of arrival   Walk-In    Escorted by   Self    Service   Hospitalist    Admission type   Emergency            Arrival complaint   facial numbness           Chief Complaint   Patient presents with   • Facial Numbness     Numbness to entire face and b/l legs, also had a resolved period of confusion this am when she couldn't remember a coworkers name, unable to focus mentally        Initial Presentation: 47 y o  female to the ED from home with complaints of facial numbness and bilateral leg numbness, difficulty focusing  Symptoms subsided for 1-2 hours and are gone on arrival to ED  Admitted under observation for TIA  H/O DMII, HLD, htn  Arrives with GCs 15  Recalls period of expressive aphagia  No symptoms on arrival  CTA head/neck shows: severe stenosis R ICA  No acute infarct  Check MRI  Consult Neurology  ABCD score:  5  Moderate risk of stroke after TIA  Check TTE  Monitor tele  ASA, atorvastatin  Date:   12/9 Continue atorvastatin  Continue with Neuro checks  Currently GCS 15  As per speech eval, passed nursing screen  Tolerating po diet       ED Triage Vitals   Temperature Pulse Respirations Blood Pressure SpO2   12/08/22 1450 12/08/22 1450 12/08/22 1450 12/08/22 1450 12/08/22 1450   97 6 °F (36 4 °C) 95 16 158/96 100 %      Temp Source Heart Rate Source Patient Position - Orthostatic VS BP Location FiO2 (%)   12/08/22 1450 12/08/22 1450 12/08/22 1450 12/08/22 1450 --   Tympanic Monitor Sitting Right arm Pain Score       12/08/22 1800       No Pain          Wt Readings from Last 1 Encounters:   12/08/22 50 8 kg (112 lb)     Additional Vital Signs:   Time Temp Pulse Resp BP MAP (mmHg) SpO2 O2 Device Patient Position - Orthostatic VS   12/09/22 0845 97 8 °F (36 6 °C) 98 12 114/76 90 97 % None (Room air) Sitting   12/09/22 0700 -- 103 16 106/64 -- 98 % -- --   12/09/22 0500 97 8 °F (36 6 °C) 101 17 102/61 -- 98 % None (Room air) Lying   12/09/22 0300 97 6 °F (36 4 °C) 98 17 98/56 -- 97 % None (Room air) Lying   12/09/22 0100 97 4 °F (36 3 °C) Abnormal  95 17 96/61 -- 97 % None (Room air) Lying   12/08/22 2300 -- 98 17 121/70 -- 96 % None (Room air) Lying   12/08/22 2200 -- 90 16 127/77 97 97 % -- --   12/08/22 2100 -- 104 22 124/70 -- 97 % -- --   12/08/22 2000 -- 100 17 129/87 -- 97 % None (Room air) Lying   12/08/22 1937 -- 107 Abnormal  18 130/78 -- 100 % None (Room air) Lying   12/08/22 1900 -- 92 15 128/73 -- 98 % None (Room air) Lying   12/08/22 1800 -- 94 15 121/70 -- 98 % None (Room air) Lying   12/08/22 1450 97 6 °F (36 4 °C) 95 16 158/96 -- 100 % None (Room air) Sitting       Pertinent Labs/Diagnostic Test Results:   12/8 EKG: Normal sinus rhythm  Possible Lateral infarct (cited on or before 08-DEC-2022)  Cannot rule out Inferior infarct (cited on or before 08-DEC-2022)     MRI brain wo contrast   Final Result by Krystian Kendall MD (12/09 7675)      1  No acute ischemia  2   Sequela of old lacunar infarct in the right centrum semiovale  Mild microangiopathic change  3   Incidental 0 7 cm T1 hyperintense lesion within posterior pituitary gland, could be cystic microadenoma versus Rathke's cleft cyst   Recommend correlation with hormonal levels and follow-up with pituitary protocol brain MRI        Workstation performed: [id number]         CTA head and neck with and without contrast   Final Result by Nicho Dela Cruz MD (12/08 1119)      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 for immediate notification           Workstation performed: [id number]               Results from last 7 days   Lab Units 12/09/22  0514 12/08/22  1451   WBC Thousand/uL 4 31 5 70   HEMOGLOBIN g/dL 14 2 14 7   HEMATOCRIT % 43 0 43 5   PLATELETS Thousands/uL 244 265   NEUTROS ABS Thousands/µL 2 06 3 31         Results from last 7 days   Lab Units 12/09/22  0514 12/08/22  1451   SODIUM mmol/L 141 139   POTASSIUM mmol/L 3 9 5 1   CHLORIDE mmol/L 102 101   CO2 mmol/L 27 27   ANION GAP mmol/L 12 11   BUN mg/dL 14 11   CREATININE mg/dL 0 51* 0 54*   EGFR ml/min/1 73sq m 111 109   CALCIUM mg/dL 9 4 9 9     Results from last 7 days   Lab Units 12/09/22  0514 12/08/22  1451   AST U/L 18 18   ALT U/L 26 26   ALK PHOS U/L 63 73   TOTAL PROTEIN g/dL 7 1 8 1   ALBUMIN g/dL 3 7 4 3   TOTAL BILIRUBIN mg/dL 0 65 0 66     Results from last 7 days   Lab Units 12/09/22  0840 12/08/22  2208 12/08/22  1849   POC GLUCOSE mg/dl 144* 158* 147*     Results from last 7 days   Lab Units 12/09/22  0514 12/08/22  1451   GLUCOSE RANDOM mg/dL 173* 178*       Results from last 7 days   Lab Units 12/08/22  1655 12/08/22  1451   HS TNI 0HR ng/L  --  <2   HS TNI 2HR ng/L <2  --            ED Treatment:   Medication Administration from 12/08/2022 1439 to 12/09/2022 4405       Date/Time Order Dose Route Action Action by Comments     12/09/2022 0514 EST heparin (porcine) subcutaneous injection 5,000 Units 5,000 Units Subcutaneous Given Nathan Sparrow RN --     12/08/2022 2227 EST insulin lispro (HumaLOG) 100 units/mL subcutaneous injection 1-5 Units 1 Units Subcutaneous Given Selina Hammer RN --        Past Medical History:   Diagnosis Date   • Cancer [facility])     breast   • Diabetes mellitus ([facility] [address] )    • Hypertension        Admitting Diagnosis: Facial numbness [R20 0]  Expressive aphasia [R47 01]  Age/Sex: 47 y o  female  Admission Orders:  Vitals every 4 hours  Neuro checks: Every 1 hour x 4 hours, then every 2 hours x 8 hours, then every 4 hours x 72 hours  Scheduled Medications:  aspirin, 81 mg, Oral, Daily  atorvastatin, 40 mg, Oral, QPM  heparin (porcine), 5,000 Units, Subcutaneous, Q8H [address]  insulin lispro, 1-5 Units, Subcutaneous, TID AC  insulin lispro, 1-5 Units, Subcutaneous, HS      Continuous IV Infusions:     PRN Meds:  acetaminophen, 650 mg, Oral, Q6H PRN        IP CONSULT TO NEUROLOGY  IP CONSULT TO CASE MANAGEMENT  IP CONSULT TO NUTRITION SERVICES    Network Utilization Review Department  ATTENTION: Please call with any questions or concerns to [phone] and carefully listen to the prompts so that you are directed to the right person  All voicemails are confidential   Dana Berman all requests for admission clinical reviews, approved or denied determinations and any other requests to dedicated fax number below belonging to the campus where the patient is receiving treatment   List of dedicated fax numbers for the Facilities:  [address] DENIALS (Administrative/Medical Necessity) [phone]   [address] [place] [address] (Maternity/NICU/Pediatrics) [phone]   [address] Narda Spencer [address] Medical [place] [address] [place] [address] [address] [facility] [address] [phone]

## 2022-12-09 NOTE — ASSESSMENT & PLAN NOTE
Lab Results   Component Value Date    HGBA1C 9 1 (H) 11/01/2022       Recent Labs     12/08/22  1849 12/08/22  2208 12/09/22  0840 12/09/22  1216   POCGLU 147* 158* 144* 133       Blood Sugar Average: Last 72 hrs:  (P) 145 5   Home Regimen: Metformin, Amaryl, Trulicity, Farxiga    Plan:  • Hold oral antihyperglycemics  • Diet Consistent CHO Level 2 (5 CHO servings/75g CHO per meal)  • Insulin regimen  o Glucose checks and Insulin correction ACHS  • Goal -180 while admitted, adjusting insulin regimen as appropriate  • Monitor for hypoglycemia and treat per protocol

## 2022-12-09 NOTE — ASSESSMENT & PLAN NOTE
CTA showed severe stenosis of 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)     Vascular surgery consult

## 2022-12-09 NOTE — ASSESSMENT & PLAN NOTE
Mallika Rosales is a 47 y  o ambidextrous female with HTN, HLD, DM2, and history of breast cancer 2013 s/p lumpectomy and radiation who was sent to the ED 12/8/2022 by her PCP for further evaluation of transient episode of confusion and numbness  She was  in usual state of health yesterday morning  Went to work at [employer] and around [employer] attempted to make list of co-workers names but couldn't think of their names for a few minutes  Shortly there after she checked her BG which was reported as 172  She went home and developed intermittent numbness of her entire face and numbness/tingling of both legs (L>R) around 10am that lasted for a few seconds to minutes  She went to her PCP who referred her to the ED for further evaluation  Imaging:  CTH/CTA: IMPRESSION:  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)  Cervical right ICA is asymmetrically smaller likely due to central stenosis  No other intracranial stenosis, large vessel occlusion or aneurysm  No significant stenosis of the cervical carotid or vertebral arteries  Mild chronic microangiopathy  No acute infarct, hemorrhage or mass effect  Borderline prominent pituitary gland, suggest nonemergent follow-up with MRI brain and sella  MRI:IMPRESSION:  1  No acute ischemia  2   Sequela of old lacunar infarct in the right centrum semiovale  Mild microangiopathic change  3   Incidental 0 7 cm T1 hyperintense lesion within posterior pituitary gland, could be cystic microadenoma versus Rathke's cleft cyst   Recommend correlation with hormonal levels and follow-up with pituitary protocol brain MRI    Pertinent Labs:  Lipid panel: 180/143/73/78  ; A1c pending  Relevant outpatient meds:  Atorvastatin 20mg daily  Recommendations:  • Check MRI c-spine w/wo contrast  • Echo pending  • Hemoglobin A1c pending  • Check TSH, B12  • Continue Aspirin 81 mg daily  • Continue Atorvastatin 20 mg qhs  • Normotension  • Euglycemic, normothermic goal  • Continue telemetry  • PT/OT/ST  • Continue to monitor and notify neurology with any changes    - Medical management and supportive care per primary team  Correction of any metabolic or infectious disturbances    - vascular surgery consult re: severe stenosis right ICA  - outpatient follow up with PCP for work-up of incidental pituitary lesion

## 2022-12-09 NOTE — SPEECH THERAPY NOTE
Speech Language/Pathology      Patient passed nursing swallow screen; presently tolerating oral diet  Need for formal evaluation of swallow function is not indicated at this time  Please re-order should status change or concerns arise       Lena Oppenheim, [address], [address] [facility]  Speech-Language Pathologist  [place] #[id number]  [place] #[id number]

## 2022-12-09 NOTE — H&P
[address] [facility] 1970, 47 y o  female MRN: [id number]  Unit/Bed#: FT 03 Encounter: [id number]  Primary Care Provider: Autumn Celis DO   Date and time admitted to hospital: 12/8/2022  3:10 PM    * TIA (transient ischemic attack)  Assessment & Plan  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)  Cervical right ICA is asymmetrically smaller likely due to central stenosis  No other intracranial stenosis, large vessel occlusion or aneurysm  No significant stenosis of the cervical carotid or vertebral arteries  Mild chronic microangiopathy  No acute infarct, hemorrhage or mass effect  Borderline prominent pituitary gland, suggest nonemergent follow-up with MRI brain and sella      Plan - Stroke pathway:  · MRI brain, Transthoracic Echocardiogram, monitor on telemetry  · Lipid Panel, HbA1c  · Atorvastatin 40 mg q d , Aspirin 81 q d  · Consult Neurology  · May also need Vascular surgery evaluation given CTA findings  · PT/OT/Speech eval  · Allow for permissive hypertension with -200 for 48 hours  · Neuro checks      Type 2 diabetes mellitus, without long-term current use of insulin (HCC)  Assessment & Plan  Lab Results   Component Value Date    HGBA1C 9 1 (H) 11/01/2022       No results for input(s): POCGLU in the last 72 hours      Blood Sugar Average: Last 72 hrs:     Home Regimen: Metformin, Amaryl, Trulicity, Farxiga    Plan:  • Hold oral antihyperglycemics  • Diet Consistent CHO Level 2 (5 CHO servings/75g CHO per meal)  • Insulin regimen  o Glucose checks and Insulin correction ACHS  • Goal -180 while admitted, adjusting insulin regimen as appropriate  • Monitor for hypoglycemia and treat per protocol      Hyperlipidemia  Assessment & Plan  No results found for: CHOLESTEROL, TRIG, HDL, LDLCALC    · Statin per Stroke/TIA pathway  · Recheck Lipid panel    Hypertension  Assessment & Plan  Blood Pressure: 127/77    Losartan 25 mg while inpatient and resume home irbesartan 75 mg on discharge    VTE Pharmacologic Prophylaxis:   High Risk (Score >/= 5) - Pharmacological DVT Prophylaxis Ordered: heparin  Sequential Compression Devices Ordered  Code Status: Level 3 - DNR/DNI  Discussion with Patient/Family: Patient declined call to   Anticipated Length of Stay: Patient will be admitted on an observation basis with an anticipated length of stay of less than 2 midnights secondary to TIA stroke rule out  Total Time for Visit, including Counseling / Coordination of Care: 70 minutes Greater than 50% of this total time spent on direct patient counseling and coordination of care  Chief Complaint:   Chief Complaint   Patient presents with   • Facial Numbness     Numbness to entire face and b/l legs, also had a resolved period of confusion this am when she couldn't remember a coworkers name, unable to focus mentally        History of Present Illness:  Jos Chamberlain is a 47 y o  female who presents with multiple episodes of expressive aphasia, difficulty remembering names of coworkers at work  Past medical history significant for DM2 (A1c 9 1), HLD, HTN  Reported that the symptoms of expressive aphasia along with numbness along the face bilaterally but did not have any symptoms of facial droop or slurring of words  Denied any associated headaches, dizziness, nausea, vomiting, abdominal pain, chest pain  Does remember the entire episode  Has not had similar experiences in the past   Presented to the ED shortly afterwards due to concern that this was possible stroke  Admitted to [place] [address] for management and work-up of TIA    Review of Systems:  A 10-point review of systems was obtained  Pertinent positives and negatives are outlined in the HPI above  Remainder of review of systems are otherwise negative  Past Medical and Surgical History:   Past Medical History:   Diagnosis Date   • Cancer ([facility] [address] )     breast   • Diabetes mellitus ([facility] )    • Hypertension        Past Surgical History:   Procedure Laterality Date   • DILATION AND CURETTAGE OF UTERUS     • LYMPHADENECTOMY         Meds/Allergies:  Prior to Admission medications    Medication Sig Start Date End Date Taking? Authorizing Provider   acetaminophen (TYLENOL) 325 mg tablet Take 2 tablets (650 mg total) by mouth every 6 (six) hours as needed for mild pain or fever 4/6/20  Yes Karl Mascorro MD   atorvastatin (LIPITOR) 20 mg tablet Take 20 mg by mouth daily   Yes Historical Provider, MD   ferrous sulfate 325 (65 Fe) mg tablet Take 325 mg by mouth daily with breakfast   Yes Historical Provider, MD   Insulin Glargine-yfgn 100 UNIT/ML SOPN Inject 10 Units under the skin in the morning Every 3rd day takes 12 units   7/26/22  Yes Historical Provider, MD   losartan (COZAAR) 25 mg tablet Take 25 mg by mouth daily   Yes Historical Provider, MD   metFORMIN (GLUCOPHAGE) 1000 MG tablet Take 1,000 mg by mouth daily with dinner   Yes Historical Provider, MD   ascorbic acid (VITAMIN C) 500 MG tablet Take 1 tablet (500 mg total) by mouth daily for 15 days 4/6/20 4/21/20  Karl Mascorro MD   dextromethorphan-guaiFENesin (ROBITUSSIN DM)  mg/5 mL syrup Take 10 mL by mouth every 4 (four) hours as needed for cough 4/6/20   Karl Mascorro MD   Dulaglutide (Trulicity) 8 61 EY/7 4WV SOPN Inject 0 75 mg under the skin Once a week    Historical Provider, MD   Dapagliflozin Propanediol (FARXIGA) 10 MG TABS Take by mouth daily  12/8/22  Historical Provider, MD   glimepiride (AMARYL) 2 mg tablet Take 2 mg by mouth every morning before breakfast  12/8/22  Historical Provider, MD   irbesartan (AVAPRO) 75 mg tablet Take 75 mg by mouth daily at bedtime  12/8/22  Historical Provider, MD   oxyCODONE-acetaminophen (PERCOCET) 5-325 mg per tablet Take 1 tablet by mouth every 4 (four) hours as needed for moderate pain for up to 10 dosesMax Daily Amount: 6 tablets  Patient not taking: Reported on 4/1/2020 12/13/19 12/8/22  Amauri Yun MD   tamoxifen (NOLVADEX) 20 mg tablet Take 20 mg by mouth 2 (two) times a day  12/8/22  Historical Provider, MD     I have reviewed home medications with patient personally  Allergies: Allergies   Allergen Reactions   • Dapagliflozin      Facial swelling       Social History:  Marital Status: /Civil Union   Occupation: Instructor educational  Patient Pre-hospital Living Situation: Home  Patient Pre-hospital Level of Mobility: walks  Patient Pre-hospital Diet Restrictions: None  Substance Use History:   Social History     Substance and Sexual Activity   Alcohol Use Yes    Comment: socially     Social History     Tobacco Use   Smoking Status Never   Smokeless Tobacco Never     Social History     Substance and Sexual Activity   Drug Use No       Family History:  History reviewed  No pertinent family history  Physical Exam:     Vitals:   Blood Pressure: 127/77 (12/08/22 2200)  Pulse: 90 (12/08/22 2200)  Temperature: 97 6 °F (36 4 °C) (12/08/22 1450)  Temp Source: Tympanic (12/08/22 1450)  Respirations: 16 (12/08/22 2200)  Weight - Scale: 50 8 kg (112 lb) (12/08/22 1906)  SpO2: 97 % (12/08/22 2200)    Physical Exam  Vitals reviewed  Constitutional:       General: She is not in acute distress  Appearance: She is well-developed  She is not diaphoretic  Interventions: She is not intubated  HENT:      Head: Normocephalic and atraumatic  Nose: Nose normal    Eyes:      General: No scleral icterus  Conjunctiva/sclera: Conjunctivae normal    Neck:      Trachea: No tracheal deviation  Cardiovascular:      Rate and Rhythm: Normal rate and regular rhythm  Pulses: Normal pulses  Radial pulses are 2+ on the right side and 2+ on the left side  Dorsalis pedis pulses are 2+ on the right side and 2+ on the left side  Heart sounds: Normal heart sounds, S1 normal and S2 normal  No murmur heard  No friction rub  No gallop  Pulmonary:      Effort: Pulmonary effort is normal  No respiratory distress  She is not intubated  Breath sounds: Normal breath sounds  No stridor  No decreased breath sounds, wheezing or rales  Chest:      Chest wall: No tenderness  Abdominal:      General: Bowel sounds are normal  There is no distension  Palpations: Abdomen is soft  There is no mass  Tenderness: There is no abdominal tenderness  Musculoskeletal:         General: No tenderness or deformity  Lymphadenopathy:      Cervical: No cervical adenopathy  Skin:     General: Skin is warm and dry  Coloration: Skin is not pale  Findings: No erythema  Neurological:      General: No focal deficit present  Mental Status: She is alert and oriented to person, place, and time  Mental status is at baseline  Psychiatric:         Speech: Speech normal          Behavior: Behavior normal          Thought Content:  Thought content normal          Judgment: Judgment normal               Additional Data:     Lab Results:  Results from last 7 days   Lab Units 12/08/22  1451   WBC Thousand/uL 5 70   HEMOGLOBIN g/dL 14 7   HEMATOCRIT % 43 5   PLATELETS Thousands/uL 265   NEUTROS PCT % 58   LYMPHS PCT % 33   MONOS PCT % 7   EOS PCT % 1     Results from last 7 days   Lab Units 12/08/22  1451   SODIUM mmol/L 139   POTASSIUM mmol/L 5 1   CHLORIDE mmol/L 101   CO2 mmol/L 27   BUN mg/dL 11   CREATININE mg/dL 0 54*   ANION GAP mmol/L 11   CALCIUM mg/dL 9 9   ALBUMIN g/dL 4 3   TOTAL BILIRUBIN mg/dL 0 66   ALK PHOS U/L 73   ALT U/L 26   AST U/L 18   GLUCOSE RANDOM mg/dL 178* Results from last 7 days   Lab Units 12/08/22 2208 12/08/22  1849   POC GLUCOSE mg/dl 158* 147*               Imaging: Reviewed radiology reports from this admission including: CT head  CTA head and neck with and without contrast   Final Result by Leno Dunn MD (12/08 1649)      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)  Cervical right ICA is asymmetrically smaller likely due to central stenosis  No other intracranial stenosis, large vessel occlusion or aneurysm  No significant stenosis of the cervical carotid or vertebral arteries  Mild chronic microangiopathy  No acute infarct, hemorrhage or mass effect  Borderline prominent pituitary gland, suggest nonemergent follow-up with MRI brain and sella  The study was marked in [facility] for immediate notification  Workstation performed: [id number]         MRI Inpatient Order    (Results Pending)       EKG and Other Studies Reviewed on Admission:   · EKG: Normal sinus rhythm  ** Please Note: This note has been constructed using a voice recognition system   **

## 2022-12-09 NOTE — ASSESSMENT & PLAN NOTE
Episode of inability to remember names of people at work  Numbness of face with no evidence of droop  Symptoms lasted for approximately 1-2 hours    Blood Pressure: 110/72  · EKG on presentation to the ER showed NSR  · ABCD Score: 5; Moderate risk of stroke risk after TIA    CTA head and neck with and without contrast - 12/8/2022 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)  Cervical right ICA is asymmetrically smaller likely due to central stenosis  No other intracranial stenosis, large vessel occlusion or aneurysm  No significant stenosis of the cervical carotid or vertebral arteries  Mild chronic microangiopathy  No acute infarct, hemorrhage or mass effect  Borderline prominent pituitary gland, suggest nonemergent follow-up with MRI brain and sella      Plan - Stroke pathway:  · MRI brain,  No acute ischemia, possible lesion on pituitary  · Transthoracic Echocardiogram showed LVEF of 46%, diastolic function was normal, structurally normal,   · monitor on telemetry  · Lipid Panel was normal  · Pending hemoglobin A1c  · Atorvastatin 40 mg q d , Aspirin 81 q d    · Consult Neurology, awaiting input  · May also need Vascular surgery evaluation given CTA findings  · PT/OT/speech all have no further recommendations and have signed off  · Neuro checks

## 2022-12-09 NOTE — ASSESSMENT & PLAN NOTE
Blood Pressure: 127/77    Losartan 25 mg while inpatient and resume home irbesartan 75 mg on discharge

## 2022-12-09 NOTE — OCCUPATIONAL THERAPY NOTE
Occupational Therapy Evaluation         Patient Name: Jessica Cash  DQSWB'R Date: 12/9/2022 12/09/22 1310   OT Last Visit   OT Visit Date 12/09/22   Note Type   Note type Evaluation   Additional Comments OT session split into two sessions  First eval session completed 1226 to 1236 and second session completed 1304 to 1310; Pt completed the EMMANUELLE-DI where she scored a 0 indicating independence in ADLs/IADLs   Pain Assessment   Pain Assessment Tool 0-10   Pain Score No Pain   Restrictions/Precautions   Weight Bearing Precautions Per Order No   Braces or Orthoses Other (Comment)  (none reported)   Other Precautions Telemetry; fall risk   Home Living   Type of Home House   Home Layout Two level;1/2 bath on main level;Bed/bath upstairs  (0 NILO; FOS to bed/bath)   Bathroom Shower/Tub Walk-in shower   Bathroom Toilet Standard   Bathroom Equipment Grab bars in shower; Shower chair   Bathroom Accessibility Accessible   Home Equipment Walker;Cane;Wheelchair-manual   Additional Comments Ambulates without device at baseline   Prior Function   Level of [place] Independent with functional mobility; Independent with ADLs; Independent with IADLS   Lives With Spouse; Other (Comment)  (2 dogs)   Receives Help From Family   IADLs Independent with driving; Independent with meal prep; Independent with medication management   Falls in the last 6 months 0   Vocational Full time employment  ([employer])   Lifestyle   Autonomy Pt lives with  and 2 dogs in two story house with 0 NILO  Pt independent with ADLs, IADLs, and functional mobility   Pt reports 0 falls   Reciprocal Relationships supportive family   Service to Others [employer]   Intrinsic Gratification shopping   General   Family/Caregiver Present No   ADL   Where Assessed Chair   Eating Assistance 7  Independent   Grooming Assistance 7  Independent   UB Bathing Assistance 7  Independent   LB Bathing Assistance 7  Independent   UB Dressing Assistance 7  Independent LB Dressing Assistance 7  Independent   Toileting Assistance  7  Independent   Functional Assistance 7  Independent   Bed Mobility   Additional Comments Pt received at start of session supine in bed with HOB elevated  Following session, pt returned to recliner and remained with needs met, alarm activated and call bell within reach   Transfers   Sit to Stand 7  Independent   Stand to Sit 7  Independent   Toilet transfer 7  Independent   Additional Comments without device   Functional Mobility   Functional Mobility 7  Independent   Additional Comments no AD used  Pt ambulated to/from bathroom and in halls   Balance   Static Sitting Good   Dynamic Sitting Fair +   Static Standing Fair +   Dynamic Standing Fair   Ambulatory Fair   Activity Tolerance   Activity Tolerance Patient tolerated treatment well   Medical Staff Made Aware Pt seen as a co-eval with PT Jewelene Countess due to the patient's co-morbidities  Nurse Made Aware ELICEO Lopez Assessment   RUE Assessment WFL   LUE Assessment   LUE Assessment WFL   Hand Function   Gross Motor Coordination Functional   Fine Motor Coordination Functional   Sensation   Light Touch No apparent deficits  (BUEs)   Vision-Basic Assessment   Current Vision Wears contacts   Patient Visual Report   (no acute changes in vision)   Psychosocial   Psychosocial (WDL) WDL   Cognition   Overall Cognitive Status WFL   Arousal/Participation Alert; Responsive; Cooperative   Attention Within functional limits   Orientation Level Oriented X4   Memory Within functional limits   Following Commands Follows all commands and directions without difficulty   Comments Pt agreeable to OT   Assessment   Assessment Pt is a 47 y o  Female seen for an OT evaluation admitted to [address]  on 12/9/2022 w/ TIA  OT orders placed for evaluation and treatment  Performed at least two patient identifiers during session including name and wristband   Comorbidities affecting the patient at time of admission include: HTN, hyperlipidemia, and type 2 DM  PTA, Pt lives with  and 2 dogs in two story house with 0 NILO  Pt independent with ADLs, IADLs, and functional mobility  Pt reports 0 falls  Personal factors affecting Pt at time of IE include: stairs to navigate within house  Upon evaluation, Pt independent in UB/LB ADLs, grooming, eating, and toileting/functional assistance  Pt completed the EMMANUELLE-DI where he scored a 0 indicating independence in ADLs/IADLs  Pt is alert and oriented x4    From OT standpoint, recommendation at time of d/c would be no rehabilitation needs  D/C OT services     Recommendation   OT Discharge Recommendation No rehabilitation needs   AM-PAC Daily Activity Inpatient   Lower Body Dressing 4   Bathing 4   Toileting 4   Upper Body Dressing 4   Grooming 4   Eating 4   Daily Activity Raw Score 24   Daily Activity Standardized Score (Calc for Raw Score >=11) 57 54   AM-PAC Applied Cognition Inpatient   Following a Speech/Presentation 4   Understanding Ordinary Conversation 4   Taking Medications 4   Remembering Where Things Are Placed or Put Away 4   Remembering List of 4-5 Errands 4   Taking Care of Complicated Tasks 4   Applied Cognition Raw Score 24   Applied Cognition Standardized Score 62 21   Barthel Index   Feeding 10   Bathing 5   Grooming Score 5   Dressing Score 10   Bladder Score 10   Bowels Score 10   Toilet Use Score 10   Transfers (Bed/Chair) Score 15   Mobility (Level Surface) Score 15   Stairs Score 5   Barthel Index Score 95

## 2022-12-09 NOTE — CASE MANAGEMENT
Case Management Assessment & Discharge Planning Note    Patient name Andry Priest  Location / MRN [id number]  : 1970 Date 2022       Current Admission Date: 2022  Current Admission Diagnosis:TIA (transient ischemic attack)   Patient Active Problem List    Diagnosis Date Noted   • TIA (transient ischemic attack) 2022   • COVID-19 virus detected 2020   • SIRS (systemic inflammatory response syndrome) ([address] ) 2020   • Hypertension 2020   • Hyperlipidemia 2020   • Viral respiratory infection 2020   • Type 2 diabetes mellitus, without long-term current use of insulin ([facility] ) 2020      LOS (days): 0  Geometric Mean LOS (GMLOS) (days):   Days to GMLOS:     OBJECTIVE:              Current admission status: Observation       Preferred Pharmacy:   [id number] - [place]  HARJ[place]ER WRIGHT - [address] [place] [address]  [place]  [address] [place] [address]  Phone: [phone] Fax: [phone]    Primary Care Provider: Kiara Argueta DO    Primary Insurance: BLUE CROSS  Secondary Insurance:     ASSESSMENT:  [address] [facility] Sruthi - Spouse   Primary Phone: [phone] (Mobile)  Home Phone: [phone]                         Readmission Root Cause  30 Day Readmission: No    Patient Information  Admitted from[de-identified] Home  Mental Status: Alert  During Assessment patient was accompanied by: Not accompanied during assessment  Assessment information provided by[de-identified] Patient  Primary Caregiver: Self  Support Systems: Self, Spouse/significant other  [place] of Residence: [place] [address] do you live in?: [facility] entry access options   Select all that apply : No steps to enter home  Type of Current Residence: 2 story home  Upon entering residence, is there a bedroom on the main floor (no further steps)?: Yes  Upon entering residence, is there a bathroom on the main floor (no further steps)?: No  Indicate which floors of current residence have a bathroom (select all the apply):: 2nd Floor  Number of steps to 2nd floor from main floor: One Flight  In the last 12 months, was there a time when you were not able to pay the mortgage or rent on time?: No  In the last 12 months, was there a time when you did not have a steady place to sleep or slept in a shelter (including now)?: No  Homeless/housing insecurity resource given?: N/A  Living Arrangements: Lives w/ Spouse/significant other  Is patient a ?: No    Activities of Daily Living Prior to Admission  Functional Status: Independent  Completes ADLs independently?: Yes  Ambulates independently?: Yes  Does patient use assisted devices?: No  Does patient currently own DME?: No  Does patient have a history of Outpatient Therapy (PT/OT)?: No  Does the patient have a history of Short-Term Rehab?: No  Does patient have a history of HHC?: No  Does patient currently have [employer]?: No         Patient Information Continued  Income Source: Employed  Does patient have prescription coverage?: Yes  Within the past 12 months, you worried that your food would run out before you got the money to buy more : Never true  Within the past 12 months, the food you bought just didn't last and you didn't have money to get more : Never true  Food insecurity resource given?: N/A  Does patient receive dialysis treatments?: No  Does patient have a history of substance abuse?: No  Does patient have a history of Mental Health Diagnosis?: No         Means of Transportation  Means of Transport to Appts[de-identified] Drives Self  In the past 12 months, has lack of transportation kept you from medical appointments or from getting medications?: No  In the past 12 months, has lack of transportation kept you from meetings, work, or from getting things needed for daily living?: No  Was application for public transport provided?: N/A        DISCHARGE DETAILS:    Discharge planning discussed with[de-identified] Patient at [address] of Choice: Yes  Comments - Freedom of Choice: No referrals made at this time  PT/OT pending  CM contacted family/caregiver?:  (Pt would like to contact her  herself )  Were Treatment Team discharge recommendations reviewed with patient/caregiver?: Yes  Did patient/caregiver verbalize understanding of patient care needs?: Yes  Were patient/caregiver advised of the risks associated with not following Treatment Team discharge recommendations?: Yes    Contacts  Patient Contacts: Patient  Relationship to Patient[de-identified] Family  Reason/Outcome: Discharge [address]         Is the patient interested in [facility] at discharge?: No    DME Referral Provided  Referral made for DME?: No    Other Referral/Resources/Interventions Provided:  Referral Comments: No referrals made at this time  PT/OT pending

## 2022-12-10 PROBLEM — R93.7 ABNORMAL MRI, CERVICAL SPINE: Status: ACTIVE | Noted: 2022-12-10

## 2022-12-10 LAB
ANION GAP SERPL CALCULATED.3IONS-SCNC: 9 MMOL/L (ref 4–13)
BUN SERPL-MCNC: 18 MG/DL (ref 5–25)
CALCIUM SERPL-MCNC: 9 MG/DL (ref 8.3–10.1)
CHLORIDE SERPL-SCNC: 105 MMOL/L (ref 96–108)
CO2 SERPL-SCNC: 26 MMOL/L (ref 21–32)
CREAT SERPL-MCNC: 0.55 MG/DL (ref 0.6–1.3)
ERYTHROCYTE [DISTWIDTH] IN BLOOD BY AUTOMATED COUNT: 11.3 % (ref 11.6–15.1)
GFR SERPL CREATININE-BSD FRML MDRD: 108 ML/MIN/1.73SQ M
GLUCOSE SERPL-MCNC: 147 MG/DL (ref 65–140)
GLUCOSE SERPL-MCNC: 185 MG/DL (ref 65–140)
GLUCOSE SERPL-MCNC: 190 MG/DL (ref 65–140)
GLUCOSE SERPL-MCNC: 201 MG/DL (ref 65–140)
GLUCOSE SERPL-MCNC: 203 MG/DL (ref 65–140)
HCT VFR BLD AUTO: 42.5 % (ref 34.8–46.1)
HGB BLD-MCNC: 14.2 G/DL (ref 11.5–15.4)
MCH RBC QN AUTO: 29.6 PG (ref 26.8–34.3)
MCHC RBC AUTO-ENTMCNC: 33.4 G/DL (ref 31.4–37.4)
MCV RBC AUTO: 89 FL (ref 82–98)
PLATELET # BLD AUTO: 251 THOUSANDS/UL (ref 149–390)
PMV BLD AUTO: 9.5 FL (ref 8.9–12.7)
POTASSIUM SERPL-SCNC: 4.4 MMOL/L (ref 3.5–5.3)
RBC # BLD AUTO: 4.79 MILLION/UL (ref 3.81–5.12)
SODIUM SERPL-SCNC: 140 MMOL/L (ref 135–147)
VIT B12 SERPL-MCNC: 270 PG/ML (ref 100–900)
WBC # BLD AUTO: 4.23 THOUSAND/UL (ref 4.31–10.16)

## 2022-12-10 RX ORDER — ASPIRIN 81 MG/1
81 TABLET, CHEWABLE ORAL DAILY
Qty: 30 TABLET | Refills: 0 | Status: SHIPPED | OUTPATIENT
Start: 2022-12-11 | End: 2023-01-10

## 2022-12-10 RX ORDER — CYANOCOBALAMIN (VITAMIN B-12) 500 MCG
500 TABLET ORAL DAILY
Qty: 30 TABLET | Refills: 1 | Status: SHIPPED | OUTPATIENT
Start: 2022-12-10 | End: 2023-01-09

## 2022-12-10 RX ADMIN — ATORVASTATIN CALCIUM 20 MG: 20 TABLET, FILM COATED ORAL at 18:40

## 2022-12-10 RX ADMIN — HEPARIN SODIUM 5000 UNITS: 5000 INJECTION INTRAVENOUS; SUBCUTANEOUS at 06:12

## 2022-12-10 RX ADMIN — ASPIRIN 81 MG: 81 TABLET, CHEWABLE ORAL at 09:34

## 2022-12-10 RX ADMIN — INSULIN LISPRO 1 UNITS: 100 INJECTION, SOLUTION INTRAVENOUS; SUBCUTANEOUS at 14:10

## 2022-12-10 RX ADMIN — INSULIN LISPRO 1 UNITS: 100 INJECTION, SOLUTION INTRAVENOUS; SUBCUTANEOUS at 22:42

## 2022-12-10 RX ADMIN — HEPARIN SODIUM 5000 UNITS: 5000 INJECTION INTRAVENOUS; SUBCUTANEOUS at 14:59

## 2022-12-10 RX ADMIN — INSULIN LISPRO 1 UNITS: 100 INJECTION, SOLUTION INTRAVENOUS; SUBCUTANEOUS at 09:34

## 2022-12-10 RX ADMIN — HEPARIN SODIUM 5000 UNITS: 5000 INJECTION INTRAVENOUS; SUBCUTANEOUS at 21:25

## 2022-12-10 NOTE — ASSESSMENT & PLAN NOTE
48 y/o F w/ PMHx of DM2, Breast CA who presents with transient alteration of awareness and B/L numbness  Full Stroke w/u performed w/ no acute ischemia noted and intracranial stenosis of JAIR on CTA  Imaging studies also c/w significant cervical spine disease and pituitary cyst/lesion  CTA H/N 12/8- 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)  Cervical right ICA is asymmetrically smaller likely due to central stenosis  No other intracranial stenosis, large vessel occlusion or aneurysm  No significant stenosis of the cervical carotid or vertebral arteries  Mild chronic microangiopathy  No acute infarct, hemorrhage or mass effect  Borderline prominent pituitary gland, suggest nonemergent follow-up with MRI brain and sella    MRI Brain 12/9- No acute ischemia  Sequela of old lacunar infarct in the right centrum semiovale  Mild microangiopathic change  Incidental 0 7 cm T1 hyperintense lesion within posterior pituitary gland, could be cystic microadenoma versus Rathke's cleft cyst  Recommend correlation with hormonal levels and follow-up with pituitary protocol brain MRI  MRI C-Spine 12/9- Degenerative spondylosis with disc osteophyte complexes causing severe canal stenosis at C4-5 and C5-6 with mild cord compression  Tiny focus of myelomalacia suggested at C5-6  Moderate canal stenosis at C6-7 with at least moderately severe foraminal stenosis  Plan:  -No evidence for cervical carotid stenosis, no role for Vascular surgical intervention  Obtain carotid duplex for future surveillance comparison (May be done in outpatient if discharge planned)  -Intracranial carotid stenosis would require Neurosurgical evaluation, which would also likely benefit the patient in setting of C-Spine disease and pituitary abnormality   -Medical management at discretion of Neurology, ASA/statin

## 2022-12-10 NOTE — QUICK NOTE
Patient MRI C-spine completed, which demonstrated severe canal stenosis at the C4/5, C5/6 with mild cord compression  Discussed with the primary team attending Dr Sim Duatre, patient will need immediate neurosurgery evaluation  Start a neck collar until nerosx clears    Please replete B12

## 2022-12-10 NOTE — CONSULTS
Polina  [address] 1970, 47 y o  female MRN: [id number]  Unit/Bed#:  Encounter: [id number]  Primary Care Provider: Joseph Blackwell DO   Date and time admitted to hospital: 12/8/2022  3:10 PM    Inpatient consult to Vascular Surgery  Consult performed by: Shahriar Blood PA-C  Consult ordered by: Leonard Paz PA-C        Carotid stenosis, asymptomatic, right  Assessment & Plan  46 y/o F w/ PMHx of DM2, Breast CA who presents with transient alteration of awareness and B/L leg numbness  Full Stroke w/u performed w/ no acute ischemia noted and intracranial stenosis of JAIR on CTA  Imaging studies also c/w significant cervical spine disease and pituitary cyst/lesion  CTA H/N 12/8- 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)  Cervical right ICA is asymmetrically smaller likely due to central stenosis  No other intracranial stenosis, large vessel occlusion or aneurysm  No significant stenosis of the cervical carotid or vertebral arteries  Mild chronic microangiopathy  No acute infarct, hemorrhage or mass effect  Borderline prominent pituitary gland, suggest nonemergent follow-up with MRI brain and sella    MRI Brain 12/9- No acute ischemia  Sequela of old lacunar infarct in the right centrum semiovale  Mild microangiopathic change  Incidental 0 7 cm T1 hyperintense lesion within posterior pituitary gland, could be cystic microadenoma versus Rathke's cleft cyst  Recommend correlation with hormonal levels and follow-up with pituitary protocol brain MRI  MRI C-Spine 12/9- Degenerative spondylosis with disc osteophyte complexes causing severe canal stenosis at C4-5 and C5-6 with mild cord compression  Tiny focus of myelomalacia suggested at C5-6  Moderate canal stenosis at C6-7 with at least moderately severe foraminal stenosis      Plan:  -No evidence for cervical carotid stenosis, no role for Vascular surgical intervention  Obtain carotid duplex for future surveillance comparison (May be done outpatient if discharge planned)  -Intracranial carotid stenosis would require Neurosurgical evaluation, which would also likely benefit the patient in setting of C-Spine disease and pituitary abnormality   -Medical management at discretion of Neurology, ASA/statin  Chief Complaint: Unable to remember names of co-workers for several minutes and bilateral L>R leg numbness    HPI: Fredo Byrd is a 47 y o  female who presents with several minutes of being unable to remember her co-workers names and bilateral leg numbness and tingling of the right foot and left leg from foot to knee  She denies expressive aphasia, inability to speak or understand words at the time of event  She did not experience visual changes or symptoms of the arms other than some possible swelling  She has PMHx of DM2 and breast CA  At the time of her symptoms she checked her blood glucose and it was 174  Currently, her symptoms have mostly resolved but she has some numbness in her feet  She is concerned she may be developing diabetic neuropathy as her HA1C is elevated at 9  Following presentation she underwent full neurologic stroke w/u which was negative for acute ischemia but did reveal intracranial JAIR stenosis  Further w/u with MRI C-spine is also c/w degenerative disease and pituitary lesion is noted which is being evaluated      Review of Systems:  General: negative  Cardiovascular: no chest pain or dyspnea on exertion  Respiratory: no cough, shortness of breath, or wheezing  Gastrointestinal: no abdominal pain, change in bowel habits, or black or bloody stools  Genitourinary ROS: no dysuria, trouble voiding, or hematuria  Musculoskeletal ROS: negative  Neurological ROS: positive for - memory loss and numbness/tingling  negative for - bowel and bladder control changes, dizziness, headaches, impaired coordination/balance, seizures, speech problems, tremors, visual changes or weakness  Hematological and Lymphatic ROS: negative  Dermatological ROS: negative  Psychological ROS: negative  Ophthalmic ROS: negative  ENT ROS: negative    Past Medical History:  Past Medical History:   Diagnosis Date   • Cancer ([facility] )     breast   • Diabetes mellitus ([facility] )    • Hypertension        Past Surgical History:  Past Surgical History:   Procedure Laterality Date   • DILATION AND CURETTAGE OF UTERUS     • LYMPHADENECTOMY         Social History:  Social History     Substance and Sexual Activity   Alcohol Use Yes    Comment: socially     Social History     Substance and Sexual Activity   Drug Use No     Social History     Tobacco Use   Smoking Status Never   Smokeless Tobacco Never       Family History:  History reviewed  No pertinent family history  Allergies: Allergies   Allergen Reactions   • Dapagliflozin      Facial swelling       Medications:  Current Facility-Administered Medications   Medication Dose Route Frequency   • acetaminophen (TYLENOL) tablet 650 mg  650 mg Oral Q6H PRN   • aspirin chewable tablet 81 mg  81 mg Oral Daily   • atorvastatin (LIPITOR) tablet 20 mg  20 mg Oral QPM   • heparin (porcine) subcutaneous injection 5,000 Units  5,000 Units Subcutaneous Q8H [address]   • insulin lispro (HumaLOG) 100 units/mL subcutaneous injection 1-5 Units  1-5 Units Subcutaneous TID AC   • insulin lispro (HumaLOG) 100 units/mL subcutaneous injection 1-5 Units  1-5 Units Subcutaneous HS       Vitals:  /74 (BP Location: Right arm)   Pulse 89   Temp 97 5 °F (36 4 °C) (Oral)   Resp 12   Ht 5' 4" (1 626 m)   Wt 50 8 kg (112 lb)   LMP 08/01/2022 Comment: premenopausal  SpO2 99%   BMI 19 22 kg/m²     I/Os:  No intake/output data recorded      Lab Results and Cultures:   Lab Results   Component Value Date    WBC 4 23 (L) 12/10/2022    HGB 14 2 12/10/2022    HCT 42 5 12/10/2022    MCV 89 12/10/2022     12/10/2022     Lab Results   Component Value Date CALCIUM 9 0 12/10/2022    K 4 4 12/10/2022    CO2 26 12/10/2022     12/10/2022    BUN 18 12/10/2022    CREATININE 0 55 (L) 12/10/2022     Lab Results   Component Value Date    INR 0 93 04/01/2020    INR 0 93 12/13/2019    PROTIME 12 5 04/01/2020    PROTIME 12 5 12/13/2019       Lipid Panel: No results found for: CHOL,     Blood Culture:   Lab Results   Component Value Date    BLOODCX No Growth After 5 Days  04/01/2020   ,   Urinalysis:   Lab Results   Component Value Date    COLORU Yellow 12/13/2019    CLARITYU Slightly Cloudy 12/13/2019    SPECGRAV 1 020 12/13/2019    PHUR 5 5 12/13/2019    LEUKOCYTESUR Negative 12/13/2019    NITRITE Positive (A) 12/13/2019    GLUCOSEU Negative 12/13/2019    KETONESU Negative 12/13/2019    BILIRUBINUR Negative 12/13/2019    BLOODU Negative 12/13/2019   ,   Urine Culture: No results found for: URINECX,   Wound Culure: No results found for: WOUNDCULT    Imaging:  See A&P for imaging results, studies reviewed by me    Physical Exam:    General appearance: alert and oriented, in no acute distress  Skin: Skin color, texture, turgor normal  No rashes or lesions  Neurologic: Grossly normal  Head: Normocephalic, without obvious abnormality, atraumatic  Eyes: conjunctivae/corneas clear  PERRL, EOM's intact  Fundi benign  Throat: lips, mucosa, and tongue normal; teeth and gums normal  Neck: no adenopathy, no carotid bruit, no JVD, supple, symmetrical, trachea midline and thyroid not enlarged, symmetric, no tenderness/mass/nodules  Back: symmetric, no curvature  ROM normal  No CVA tenderness    Lungs: clear to auscultation bilaterally  Chest wall: no tenderness  Heart: regular rate and rhythm, S1, S2 normal, no murmur, click, rub or gallop  Abdomen: soft, non-tender; bowel sounds normal; no masses,  no organomegaly  Extremities: extremities normal, warm and well-perfused; no cyanosis, clubbing, or edema      Ajit Dotson PA-C  12/10/2022

## 2022-12-10 NOTE — PROGRESS NOTES
[address] [facility]  Progress Note - Michael Ryan 1970, 47 y o  female MRN: [id number]  Unit/Bed#:  Encounter: [id number]  Primary Care Provider: Joel Fee, DO   Date and time admitted to hospital: 12/8/2022  3:10 PM    * TIA (transient ischemic attack)  Assessment & Plan  · Present on admission  · Stroke work up negative for acute CVA  · Did have severe R ICA stenosis, vascular surgery consulted and recommending outpatient follow up with carotid doppler  · Back to baseline, neurology following, see recommendations     Abnormal MRI, cervical spine  Assessment & Plan  · MRI cervical spine showing osteophyte complexes causing severe canal stenosis at C4-5 and C5-6 with mild cord compression  · Refer to imaging report for detailed description  · Consult neurosurgery for further recommendations  · Currently no signs/symptoms of acute cord compression  · Motor/sensory intact  · Monitor and follow up with neurosurgery     Paresthesias  Assessment & Plan  · Secondary to central canal stenosis   · Currently stable pending neurosurgery recommendations    Carotid stenosis, asymptomatic, right  Assessment & Plan  · Plan for carotid doppler  · Could be done inpatient or outpatient, either way, vascular surgery will follow up as an outpatient          VTE Pharmacologic Prophylaxis: VTE Score: 6 High Risk (Score >/= 5) - Pharmacological DVT Prophylaxis Ordered: heparin  Sequential Compression Devices Ordered  Patient Centered Rounds: I performed bedside rounds with nursing staff today  Discussions with Specialists or Other Care Team Provider: neurology     Time Spent for Care: 30 minutes  More than 50% of total time spent on counseling and coordination of care as described above      Current Length of Stay: 0 day(s)  Current Patient Status: Inpatient   Certification Statement: The patient will continue to require additional inpatient hospital stay due to neurosurgery recommendations  Discharge Plan: Anticipate discharge in 24-48 hrs to home  Code Status: Level 3 - DNAR and DNI    Subjective:   Patient feels well overall     Objective:     Vitals:   Temp (24hrs), Av 6 °F (36 4 °C), Min:97 5 °F (36 4 °C), Max:97 8 °F (36 6 °C)    Temp:  [97 5 °F (36 4 °C)-97 8 °F (36 6 °C)] 97 8 °F (36 6 °C)  HR:  [88-98] 88  Resp:  [12-24] 12  BP: (103-117)/(70-79) 104/70  SpO2:  [96 %-99 %] 97 %  Body mass index is 19 22 kg/m²  Input and Output Summary (last 24 hours):   No intake or output data in the 24 hours ending 12/10/22 1541    Physical Exam:   Physical Exam  Constitutional:       General: She is not in acute distress  Appearance: Normal appearance  She is not toxic-appearing  Cardiovascular:      Rate and Rhythm: Normal rate and regular rhythm  Heart sounds: Normal heart sounds  No murmur heard  Pulmonary:      Effort: Pulmonary effort is normal  No respiratory distress  Breath sounds: Normal breath sounds  No wheezing  Abdominal:      General: Abdomen is flat  There is no distension  Palpations: Abdomen is soft  Tenderness: There is no abdominal tenderness  Neurological:      General: No focal deficit present  Mental Status: She is alert and oriented to person, place, and time  Mental status is at baseline  Motor: No weakness            Additional Data:     Labs:  Results from last 7 days   Lab Units 12/10/22  0609 22  0514   WBC Thousand/uL 4 23* 4 31   HEMOGLOBIN g/dL 14 2 14 2   HEMATOCRIT % 42 5 43 0   PLATELETS Thousands/uL 251 244   NEUTROS PCT %  --  48   LYMPHS PCT %  --  42   MONOS PCT %  --  7   EOS PCT %  --  2     Results from last 7 days   Lab Units 12/10/22  0609 22  0514   SODIUM mmol/L 140 141   POTASSIUM mmol/L 4 4 3 9   CHLORIDE mmol/L 105 102   CO2 mmol/L 26 27   BUN mg/dL 18 14   CREATININE mg/dL 0 55* 0 51*   ANION GAP mmol/L 9 12   CALCIUM mg/dL 9 0 9 4   ALBUMIN g/dL  --  3 7   TOTAL BILIRUBIN mg/dL --  0 65   ALK PHOS U/L  --  63   ALT U/L  --  26   AST U/L  --  18   GLUCOSE RANDOM mg/dL 190* 173*         Results from last 7 days   Lab Units 12/10/22  1113 12/10/22  0757 12/09/22  2215 12/09/22  1606 12/09/22  1216 12/09/22  0840 12/08/22  2208 12/08/22  1849   POC GLUCOSE mg/dl 203* 185* 228* 239* 133 144* 158* 147*               Lines/Drains:  Invasive Devices     Peripheral Intravenous Line  Duration           Peripheral IV 12/08/22 Left Antecubital 1 day                      Imaging: Reviewed radiology reports from this admission including: MRI spine    Recent Cultures (last 7 days):         Last 24 Hours Medication List:   Current Facility-Administered Medications   Medication Dose Route Frequency Provider Last Rate   • acetaminophen  650 mg Oral Q6H PRN Ludwig Solorzano DO     • aspirin  81 mg Oral Daily Ludwig Solorzano DO     • atorvastatin  20 mg Oral QPM GEGE Lantigua     • heparin (porcine)  5,000 Units Subcutaneous Q8H [address] Ludwig Solorzano DO     • insulin lispro  1-5 Units Subcutaneous TID AC Ludwig Solorzano DO     • insulin lispro  1-5 Units Subcutaneous HS Ludwig Solorzano DO          Today, Patient Was Seen By: Rashard Mendsoa MD    **Please Note: This note may have been constructed using a voice recognition system  **

## 2022-12-10 NOTE — UTILIZATION REVIEW
INITIAL CLINICAL   Review    Date:    12/10/22                          Current Patient Class:    Inpatient  Current Level of Care:    ICU    12/10/22 1534  Inpatient Admission  Once        Transfer Service: Hospitalist       Question Answer Comment   Level of Care Med Surg    Estimated length of stay More than 2 Midnights    Certification I certify that inpatient services are medically necessary for this patient for a duration of greater than two midnights  See H&P and MD Progress Notes for additional information about the patient's course of treatment  12/10/22 1534   OBSERVATION   12/8 @  1701  CHANGED TO IP ADMISSION  12/10 @  1534  The patient will continue to require additional inpatient hospital stay due to neurosurgery recommendations    HPI:52 y o  female initially admitted on   12/8      With TIA    Assessment/Plan:    12/10  IP ADMISSION    Vascular consult  No  Vascular intervention at present  Wait  Neuro surgery  eval due  To MRI findings  Continue current meds/treatment plan  Vital Signs:    97 2 °F (36 2 °C) Abnormal  101 20 113/72 90 97 % None (Room air) Sitting   12/10/22 1100 97 8 °F (36 6 °C) 88 12 104/70 82 97 % -- Sitting   12/10/22 0700 97 5 °F (36 4 °C) 89 12 117/74 90 99 % -- Lying         Pertinent Labs/Diagnostic Results:   MRI  C/spine  ( 12/10)   Degenerative spondylosis with disc osteophyte complexes causing severe canal stenosis at C4-5 and C5-6 with mild cord compression   Tiny focus of myelomalacia suggested at C5-6  Moderate canal stenosis at C6-7 with at least moderately severe foraminal stenosis  MRI  Brain ( 12/10)  Stable 0 6 x 0 7 x 1 1 cm 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 measuring  without definite enhancement  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 but endocrinological evaluation and follow-up is recommended  No abnormal enhancement of the brain         Results from last 7 days   Lab Units 12/10/22  0609 12/09/22  0514 12/08/22  1451   WBC Thousand/uL 4 23* 4 31 5 70   HEMOGLOBIN g/dL 14 2 14 2 14 7   HEMATOCRIT % 42 5 43 0 43 5   PLATELETS Thousands/uL 251 244 265   NEUTROS ABS Thousands/µL  --  2 06 3 31         Results from last 7 days   Lab Units 12/10/22  0609 12/09/22  0514 12/08/22  1451   SODIUM mmol/L 140 141 139   POTASSIUM mmol/L 4 4 3 9 5 1   CHLORIDE mmol/L 105 102 101   CO2 mmol/L 26 27 27   ANION GAP mmol/L 9 12 11   BUN mg/dL 18 14 11   CREATININE mg/dL 0 55* 0 51* 0 54*   EGFR ml/min/1 73sq m 108 111 109   CALCIUM mg/dL 9 0 9 4 9 9     Results from last 7 days   Lab Units 12/09/22  0514 12/08/22  1451   AST U/L 18 18   ALT U/L 26 26   ALK PHOS U/L 63 73   TOTAL PROTEIN g/dL 7 1 8 1   ALBUMIN g/dL 3 7 4 3   TOTAL BILIRUBIN mg/dL 0 65 0 66     Results from last 7 days   Lab Units 12/10/22  1550 12/10/22  1113 12/10/22  0757 12/09/22  2215 12/09/22  1606 12/09/22  1216 12/09/22  0840 12/08/22  2208 12/08/22  1849   POC GLUCOSE mg/dl 147* 203* 185* 228* 239* 133 144* 158* 147*     Results from last 7 days   Lab Units 12/10/22  0609 12/09/22  0514 12/08/22  1451   GLUCOSE RANDOM mg/dL 190* 173* 178*           Results from last 7 days   Lab Units 12/08/22  1655 12/08/22  1451   HS TNI 0HR ng/L  --  <2   HS TNI 2HR ng/L <2  --              Results from last 7 days   Lab Units 12/09/22  0514   TSH 3RD GENERATON uIU/mL 1 506           Medications:   Scheduled Medications:  aspirin, 81 mg, Oral, Daily  atorvastatin, 20 mg, Oral, QPM  heparin (porcine), 5,000 Units, Subcutaneous, Q8H NIESHA  insulin lispro, 1-5 Units, Subcutaneous, TID AC  insulin lispro, 1-5 Units, Subcutaneous, HS      Continuous IV Infusions:     PRN Meds:  acetaminophen, 650 mg, Oral, Q6H PRN        Discharge Plan:    TBD    Network Utilization Review Department  ATTENTION: Please call with any questions or concerns to [phone] and carefully listen to the prompts so that you are directed to the right person   All voicemails are [facility] all requests for admission clinical reviews, approved or denied determinations and any other requests to dedicated fax number below belonging to the campus where the patient is receiving treatment   List of dedicated fax numbers for the Facilities:  [address] DENIALS (Administrative/Medical Necessity) [phone]   [address] (Maternity/NICU/Pediatrics) [phone]   [address] Narda Spencer [phone]   [address] 77 [phone]   [address] [facility] 28 [phone]   [address] [facility] 134 815 [facility] [phone]

## 2022-12-10 NOTE — ASSESSMENT & PLAN NOTE
· Plan for carotid doppler  · Could be done inpatient or outpatient, either way, vascular surgery will follow up as an outpatient

## 2022-12-10 NOTE — ASSESSMENT & PLAN NOTE
· MRI cervical spine showing osteophyte complexes causing severe canal stenosis at C4-5 and C5-6 with mild cord compression      · Refer to imaging report for detailed description  · Consult neurosurgery for further recommendations  · Currently no signs/symptoms of acute cord compression  · Motor/sensory intact  · Monitor and follow up with neurosurgery

## 2022-12-11 VITALS
HEIGHT: 64 IN | DIASTOLIC BLOOD PRESSURE: 68 MMHG | HEART RATE: 82 BPM | TEMPERATURE: 97.7 F | RESPIRATION RATE: 18 BRPM | BODY MASS INDEX: 19.12 KG/M2 | SYSTOLIC BLOOD PRESSURE: 105 MMHG | OXYGEN SATURATION: 96 % | WEIGHT: 112 LBS

## 2022-12-11 PROBLEM — R40.4 TRANSIENT ALTERATION OF AWARENESS: Status: RESOLVED | Noted: 2022-12-09 | Resolved: 2022-12-11

## 2022-12-11 LAB
EST. AVERAGE GLUCOSE BLD GHB EST-MCNC: 217 MG/DL
GLUCOSE SERPL-MCNC: 188 MG/DL (ref 65–140)
HBA1C MFR BLD: 9.2 %

## 2022-12-11 RX ADMIN — INSULIN LISPRO 1 UNITS: 100 INJECTION, SOLUTION INTRAVENOUS; SUBCUTANEOUS at 07:32

## 2022-12-11 RX ADMIN — HEPARIN SODIUM 5000 UNITS: 5000 INJECTION INTRAVENOUS; SUBCUTANEOUS at 05:33

## 2022-12-11 NOTE — ASSESSMENT & PLAN NOTE
Blood Pressure: 105/68    Losartan 25 mg while inpatient and resume home irbesartan 75 mg on discharge

## 2022-12-11 NOTE — ASSESSMENT & PLAN NOTE
· Present on admission  · Stroke work up negative for acute CVA  · Did have severe R ICA stenosis, vascular surgery consulted and recommending outpatient follow up with carotid doppler  · Back to baseline, neurology following, see recommendations  They did recommend neurosurgical evaluation  Dr Monae Cardona did speak to neurosurgery yesterday and I did also touch base with them and the patient is okay for discharge with outpatient follow-up

## 2022-12-11 NOTE — ASSESSMENT & PLAN NOTE
· Patient denies any complaints right now  She denies any paresthesias to me    Neurosurgical recommendation was for outpatient follow-up

## 2022-12-11 NOTE — PLAN OF CARE
Problem: Potential for Falls  Goal: Patient will remain free of falls  Description: INTERVENTIONS:  - Educate patient/family on patient safety including physical limitations  - Instruct patient to call for assistance with activity   - Consult OT/PT to assist with strengthening/mobility   - Keep Call bell within reach  - Keep bed low and locked with side rails adjusted as appropriate  - Keep care items and personal belongings within reach  - Initiate and maintain comfort rounds  - Make Fall Risk Sign visible to staff  - Offer Toileting every  Hours, in advance of need  - Initiate/Maintain alarm  - Obtain necessary fall risk management equipment:   - Apply yellow socks and bracelet for high fall risk patients  - Consider moving patient to room near nurses station  Outcome: Adequate for Discharge     Problem: PAIN - ADULT  Goal: Verbalizes/displays adequate comfort level or baseline comfort level  Description: Interventions:  - Encourage patient to monitor pain and request assistance  - Assess pain using appropriate pain scale  - Administer analgesics based on type and severity of pain and evaluate response  - Implement non-pharmacological measures as appropriate and evaluate response  - Consider cultural and social influences on pain and pain management  - Notify physician/advanced practitioner if interventions unsuccessful or patient reports new pain  Outcome: Adequate for Discharge     Problem: INFECTION - ADULT  Goal: Absence or prevention of progression during hospitalization  Description: INTERVENTIONS:  - Assess and monitor for signs and symptoms of infection  - Monitor lab/diagnostic results  - Monitor all insertion sites, i e  indwelling lines, tubes, and drains  - Monitor endotracheal if appropriate and nasal secretions for changes in amount and color  - [place] appropriate cooling/warming therapies per order  - Administer medications as ordered  - Instruct and encourage patient and family to use good hand hygiene technique  - Identify and instruct in appropriate isolation precautions for identified infection/condition  Outcome: Adequate for Discharge  Goal: Absence of fever/infection during neutropenic period  Description: INTERVENTIONS:  - Monitor WBC    Outcome: Adequate for Discharge     Problem: SAFETY ADULT  Goal: Patient will remain free of falls  Description: INTERVENTIONS:  - Educate patient/family on patient safety including physical limitations  - Instruct patient to call for assistance with activity   - Consult OT/PT to assist with strengthening/mobility   - Keep Call bell within reach  - Keep bed low and locked with side rails adjusted as appropriate  - Keep care items and personal belongings within reach  - Initiate and maintain comfort rounds  - Make Fall Risk Sign visible to staff  - Offer Toileting every  Hours, in advance of need  - Initiate/Maintain alarm  - Obtain necessary fall risk management equipment:   - Apply yellow socks and bracelet for high fall risk patients  - Consider moving patient to room near nurses station  Outcome: Adequate for Discharge  Goal: Maintain or return to baseline ADL function  Description: INTERVENTIONS:  -  Assess patient's ability to carry out ADLs; assess patient's baseline for ADL function and identify physical deficits which impact ability to perform ADLs (bathing, care of mouth/teeth, toileting, grooming, dressing, etc )  - Assess/evaluate cause of self-care deficits   - Assess range of motion  - Assess patient's mobility; develop plan if impaired  - Assess patient's need for assistive devices and provide as appropriate  - Encourage maximum independence but intervene and supervise when necessary  - Involve family in performance of ADLs  - Assess for home care needs following discharge   - Consider OT consult to assist with ADL evaluation and planning for discharge  - Provide patient education as appropriate  Outcome: Adequate for Discharge  Goal: Maintains/Returns to pre admission functional level  Description: INTERVENTIONS:  - Perform BMAT or MOVE assessment daily    - Set and communicate daily mobility goal to care team and patient/family/caregiver  - Collaborate with rehabilitation services on mobility goals if consulted  - Perform Range of Motion  times a day  - Reposition patient every  hours  - Dangle patient  times a day  - Stand patient  times a day  - Ambulate patient  times a day  - Out of bed to chair  times a day   - Out of bed for meals times a day  - Out of bed for toileting  - Record patient progress and toleration of activity level   Outcome: Adequate for Discharge     Problem: DISCHARGE PLANNING  Goal: Discharge to home or other facility with appropriate resources  Description: INTERVENTIONS:  - Identify barriers to discharge w/patient and caregiver  - Arrange for needed discharge resources and transportation as appropriate  - Identify discharge learning needs (meds, wound care, etc )  - Arrange for interpretive services to assist at discharge as needed  - Refer to Case Management Department for coordinating discharge planning if the patient needs post-hospital services based on physician/advanced practitioner order or complex needs related to functional status, cognitive ability, or social support system  Outcome: Adequate for Discharge     Problem: Knowledge Deficit  Goal: Patient/family/caregiver demonstrates understanding of disease process, treatment plan, medications, and discharge instructions  Description: Complete learning assessment and assess knowledge base    Interventions:  - Provide teaching at level of understanding  - Provide teaching via preferred learning methods  Outcome: Adequate for Discharge     Problem: NEUROSENSORY - ADULT  Goal: Achieves stable or improved neurological status  Description: INTERVENTIONS  - Monitor and report changes in neurological status  - Monitor vital signs such as temperature, blood pressure, glucose, and any other labs ordered   - Initiate measures to prevent increased intracranial pressure  - Monitor for seizure activity and implement precautions if appropriate      Outcome: Adequate for Discharge  Goal: Remains free of injury related to seizures activity  Description: INTERVENTIONS  - Maintain airway, patient safety  and administer oxygen as ordered  - Monitor patient for seizure activity, document and report duration and description of seizure to physician/advanced practitioner  - If seizure occurs,  ensure patient safety during seizure  - Reorient patient post seizure  - Seizure pads on all 4 side rails  - Instruct patient/family to notify RN of any seizure activity including if an aura is experienced  - Instruct patient/family to call for assistance with activity based on nursing assessment  - Administer anti-seizure medications if ordered    Outcome: Adequate for Discharge  Goal: Achieves maximal functionality and self care  Description: INTERVENTIONS  - Monitor swallowing and airway patency with patient fatigue and changes in neurological status  - Encourage and assist patient to increase activity and self care  - Encourage visually impaired, hearing impaired and aphasic patients to use assistive/communication devices  Outcome: Adequate for Discharge     Problem: Neurological Deficit  Goal: Neurological status is stable or improving  Description: Interventions:  - Monitor and assess patient's level of consciousness, motor function, sensory function, and level of assistance needed for ADLs  - Monitor and report changes from baseline  Collaborate with interdisciplinary team to initiate plan and implement interventions as ordered  - Provide and maintain a safe environment  - Consider seizure precautions  - Consider fall precautions  - Consider aspiration precautions  - Consider bleeding precautions  Outcome: Adequate for Discharge     Problem:  Activity Intolerance/Impaired Mobility  Goal: Mobility/activity is maintained at optimum level for patient  Description: Interventions:  - Assess and monitor patient  barriers to mobility and need for assistive/adaptive devices  - Assess patient's emotional response to limitations  - Collaborate with interdisciplinary team and initiate plans and interventions as ordered  - Encourage independent activity per ability   - Maintain proper body alignment  - Perform active/passive rom as tolerated/ordered  - Plan activities to conserve energy   - Turn patient as appropriate  Outcome: Adequate for Discharge     Problem: Communication Impairment  Goal: Ability to express needs and understand communication  Description: Assess patient's communication skills and ability to understand information  Patient will demonstrate use of effective communication techniques, alternative methods of communication and understanding even if not able to speak  - Encourage communication and provide alternate methods of communication as needed  - Collaborate with case management/ for discharge needs  - Include patient/family/caregiver in decisions related to communication  Outcome: Adequate for Discharge     Problem: Potential for Aspiration  Goal: Non-ventilated patient's risk of aspiration is minimized  Description: Assess and monitor vital signs, respiratory status, and labs (WBC)  Monitor for signs of aspiration (tachypnea, cough, rales, wheezing, cyanosis, fever)  - Assess and monitor patient's ability to swallow  - Place patient up in chair to eat if possible  - HOB up at 90 degrees to eat if unable to get patient up into chair   - Supervise patient during oral intake  - Instruct patient/ family to take small bites  - Instruct patient/ family to take small single sips when taking liquids    - Follow patient-specific strategies generated by speech pathologist   Outcome: Adequate for Discharge  Goal: Ventilated patient's risk of aspiration is minimized  Description: Assess and monitor vital signs, respiratory status, airway cuff pressure, and labs (WBC)  Monitor for signs of aspiration (tachypnea, cough, rales, wheezing, cyanosis, fever)  - Elevate head of bed 30 degrees if patient has tube feeding   - Monitor tube feeding  Outcome: Adequate for Discharge     Problem: Nutrition  Goal: Nutrition/Hydration status is improving  Description: Monitor and assess patient's nutrition/hydration status for malnutrition (ex- brittle hair, bruises, dry skin, pale skin and conjunctiva, muscle wasting, smooth red tongue, and disorientation)  Collaborate with interdisciplinary team and initiate plan and interventions as ordered  Monitor patient's weight and dietary intake as ordered or per policy  Utilize nutrition screening tool and intervene per policy  Determine patient's food preferences and provide high-protein, high-caloric foods as appropriate  - Assist patient with eating   - Allow adequate time for meals   - Encourage patient to take dietary supplement as ordered  - Collaborate with clinical nutritionist   - Include patient/family/caregiver in decisions related to nutrition    Outcome: Adequate for Discharge

## 2022-12-11 NOTE — DISCHARGE SUMMARY
[facility]  Discharge- Gertrude Ibarra 1970, 47 y o  female MRN: [id number]  Unit/Bed#: -02 Encounter: [id number]  Primary Care Provider: Joel Fee, DO   Date and time admitted to hospital: 12/8/2022  3:10 PM    Abnormal MRI, cervical spine  Assessment & Plan  · MRI cervical spine showing osteophyte complexes causing severe canal stenosis at C4-5 and C5-6 with mild cord compression  · Refer to imaging report for detailed description  · Neurosurgical follow-up as an outpatient    Carotid stenosis, asymptomatic, right  Assessment & Plan  · Plan for carotid doppler  · Could be done inpatient or outpatient, either way, vascular surgery will follow up as an outpatient    Paresthesias  Assessment & Plan  · Patient denies any complaints right now  She denies any paresthesias to me  Neurosurgical recommendation was for outpatient follow-up    Type 2 diabetes mellitus, without long-term current use of insulin [facility])  Assessment & Plan  Lab Results   Component Value Date    HGBA1C 9 2 (H) 12/08/2022       Recent Labs     12/10/22  1113 12/10/22  1550 12/10/22  2101 12/11/22  0613   POCGLU 203* 147* 201* 188*       Blood Sugar Average: Last 72 hrs:  (P) 569 [id number]   Home Regimen: Metformin, Amaryl, Trulicity, Farxiga    Plan:  • Hold oral antihyperglycemics  • Diet Consistent CHO Level 2 (5 CHO servings/75g CHO per meal)  • Insulin regimen  o Glucose checks and Insulin correction ACHS  • Goal -180 while admitted, adjusting insulin regimen as appropriate  Monitor for hypoglycemia and treat per protocol    Close outpatient follow-up with family doctor    Hyperlipidemia  Assessment & Plan  Lab Results   Component Value Date    CHOLESTEROL 180 12/08/2022    TRIG 143 12/08/2022    HDL 73 12/08/2022    LDLCALC 78 12/08/2022       · Statin per Stroke/TIA pathway  · Lipid panel was within normal limits    Hypertension  Assessment & Plan  Blood Pressure: 105/68    Losartan 25 mg while inpatient and resume home irbesartan 75 mg on discharge    * TIA (transient ischemic attack)  Assessment & Plan  · Present on admission  · Stroke work up negative for acute CVA  · Did have severe R ICA stenosis, vascular surgery consulted and recommending outpatient follow up with carotid doppler  · Back to baseline, neurology following, see recommendations  They did recommend neurosurgical evaluation  Dr Chinedu Tam did speak to neurosurgery yesterday and I did also touch base with them and the patient is okay for discharge with outpatient follow-up  Transient alteration of awareness-resolved as of 12/11/2022  Assessment & Plan  This could have been a TIA versus TGA      Discharging Physician / Practitioner: Easton Chavez MD  PCP: Joseph Blackwell DO  Admission Date:   Admission Orders (From admission, onward)     Ordered        12/10/22 1534  Inpatient Admission  Once            12/08/22 1701  Place in Observation  Once                      Discharge Date: 12/11/22    Medical Problems     Resolved Problems  Date Reviewed: 12/11/2022          Resolved    Transient alteration of awareness 12/11/2022     Resolved by  Easton Chavez MD    Overview Signed 12/9/2022  3:58 PM by Carmen Delatorre, [address] Stay:  · Neurology  · Vascular surgery    Procedures Performed:   CTA head and neck with and without contrast    Result Date: 12/8/2022    Impression: 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)  Cervical right ICA is asymmetrically smaller likely due to central stenosis  No other intracranial stenosis, large vessel occlusion or aneurysm  No significant stenosis of the cervical carotid or vertebral arteries  Mild chronic microangiopathy  No acute infarct, hemorrhage or mass effect  Borderline prominent pituitary gland, suggest nonemergent follow-up with MRI brain and sella   The study was marked in EPIC for immediate notification  Workstation performed: [id number]     MRI brain wo contrast    Result Date: 12/9/2022    Impression: 1  No acute ischemia  2   Sequela of old lacunar infarct in the right centrum semiovale  Mild microangiopathic change  3   Incidental 0 7 cm T1 hyperintense lesion within posterior pituitary gland, could be cystic microadenoma versus Rathke's cleft cyst   Recommend correlation with hormonal levels and follow-up with pituitary protocol brain MRI  Workstation performed: [id number]     MRI brain w contrast    Result Date: 12/10/2022    Impression: Stable 0 6 x 0 7 x 1 1 cm 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 measuring  without definite enhancement  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 but endocrinological evaluation and follow-up is recommended  No abnormal enhancement of the brain  Workstation performed: [id number]     MRI cervical spine w wo contrast    Result Date: 12/10/2022      Impression: Degenerative spondylosis with disc osteophyte complexes causing severe canal stenosis at C4-5 and C5-6 with mild cord compression  Tiny focus of myelomalacia suggested at C5-6  Moderate canal stenosis at C6-7 with at least moderately severe foraminal stenosis  Workstation performed: [id number]     Echo complete w/ contrast if indicated    Result Date: 12/9/2022  · Narrative: •  Left Ventricle: Left ventricular cavity size is normal  Wall thickness is normal  The left ventricular ejection fraction is 60%  Systolic function is normal  Wall motion is normal  Diastolic function is normal  •  Atrial Septum: There is an interatrial septal occluder device noted  No evidence of color flow across the interatrial septum  •  Tricuspid Valve: There is mild regurgitation  ·     Significant Findings / Test Results:   · See above    Incidental Findings:   · Above    Test Results Pending at Discharge (will require follow up):    · None     Outpatient Tests Requested:  · Outpatient EEG  · Outpatient neurological and neurosurgical follow-up    Complications: None    Reason for Admission: Facial numbness and confusion    Hospital Course:     Jocelin Garvin is a 47 y o  female patient who originally presented to the hospital on 12/8/2022 due to patient numbness and confusion    History of presenting Vanessa Barros is a 47 y o  female who presents with multiple episodes of expressive aphasia, difficulty remembering names of coworkers at work      Past medical history significant for DM2 (A1c 9 1), HLD, HTN      Reported that the symptoms of expressive aphasia along with numbness along the face bilaterally but did not have any symptoms of facial droop or slurring of words  Denied any associated headaches, dizziness, nausea, vomiting, abdominal pain, chest pain  Does remember the entire episode  Has not had similar experiences in the past   Presented to the ED shortly afterwards due to concern that this was possible stroke  Admitted to [place] [address] for management and work-up of TIA       Hospital course: Patient was admitted for the above reasons  Given the patient's symptoms the patient did have a stroke work-up which was otherwise unremarkable  Patient was seen in consultation by neurology  Patient had further work-up with a CT scan of the cervical spine  I did speak to neurosurgery and from their standpoint there is nothing from their standpoint to be done here and the patient could follow-up as an outpatient  I did touch base with Dr Jorge Telles also spoke to him the previous day  The patient has no neurological deficit at this time and feeling significantly better  She is improved and back to her baseline  Patient is aware of all the follow-up  Patient will need to follow-up with vascular surgery as an outpatient as well  Please see above list of diagnoses and related plan for additional information       Condition at Discharge: good Discharge Day Visit / Exam:     Subjective: Patient seen and examined  She has no complaints and states she feels well  Vitals: Blood Pressure: 105/68 (12/11/22 0614)  Pulse: 82 (12/11/22 0614)  Temperature: 97 7 °F (36 5 °C) (12/11/22 0614)  Temp Source: Oral (12/11/22 0300)  Respirations: 18 (12/11/22 0614)  Height: 5' 4" (162 6 cm) (12/09/22 1110)  Weight - Scale: 50 8 kg (112 lb) (12/09/22 1110)  SpO2: 96 % (12/11/22 1323)  Exam:   Physical Exam  (   General Appearance:    Alert, cooperative, no distress, appears stated age                               Lungs:     Clear to auscultation bilaterally, respirations unlabored       Heart:    Regular rate and rhythm, S1 and S2 normal, no murmur, rub    or gallop   Abdomen:     Soft, non-tender, bowel sounds active all four quadrants,     no masses, no organomegaly           Extremities:   Extremities normal, atraumatic, no cyanosis or edema         Discharge instructions/Information to patient and family:   See after visit summary for information provided to patient and family  Provisions for Follow-Up Care:  See after visit summary for information related to follow-up care and any pertinent home health orders  Disposition:     Home    For Discharges to [facility] SNF:   · Not Applicable to this Patient - Not Applicable to this Patient    Planned Readmission: Not anticipated     Discharge Statement:  I spent 35 minutes discharging the patient  This time was spent on the day of discharge  I had direct contact with the patient on the day of discharge  Greater than 50% of the total time was spent examining patient, answering all patient questions, arranging and discussing plan of care with patient as well as directly providing post-discharge instructions  Additional time then spent on discharge activities  Discharge Medications:  See after visit summary for reconciled discharge medications provided to patient and family        ** Please Note: This note has been constructed using a voice recognition system **

## 2022-12-11 NOTE — PLAN OF CARE
Problem: Potential for Falls  Goal: Patient will remain free of falls  Description: INTERVENTIONS:  - Educate patient/family on patient safety including physical limitations  - Instruct patient to call for assistance with activity   - Consult OT/PT to assist with strengthening/mobility   - Keep Call bell within reach  - Keep bed low and locked with side rails adjusted as appropriate  - Keep care items and personal belongings within reach  - Initiate and maintain comfort rounds  - Make Fall Risk Sign visible to staff  - Offer Toileting every 2 Hours, in advance of need  - Initiate/Maintain bedalarm  - Obtain necessary fall risk management equipment:   - Apply yellow socks and bracelet for high fall risk patients  - Consider moving patient to room near nurses station  Outcome: Progressing     Problem: PAIN - ADULT  Goal: Verbalizes/displays adequate comfort level or baseline comfort level  Description: Interventions:  - Encourage patient to monitor pain and request assistance  - Assess pain using appropriate pain scale  - Administer analgesics based on type and severity of pain and evaluate response  - Implement non-pharmacological measures as appropriate and evaluate response  - Consider cultural and social influences on pain and pain management  - Notify physician/advanced practitioner if interventions unsuccessful or patient reports new pain  Outcome: Progressing     Problem: INFECTION - ADULT  Goal: Absence or prevention of progression during hospitalization  Description: INTERVENTIONS:  - Assess and monitor for signs and symptoms of infection  - Monitor lab/diagnostic results  - Monitor all insertion sites, i e  indwelling lines, tubes, and drains  - Monitor endotracheal if appropriate and nasal secretions for changes in amount and color  - Holt appropriate cooling/warming therapies per order  - Administer medications as ordered  - Instruct and encourage patient and family to use good hand hygiene technique  - Identify and instruct in appropriate isolation precautions for identified infection/condition  Outcome: Progressing  Goal: Absence of fever/infection during neutropenic period  Description: INTERVENTIONS:  - Monitor WBC    Outcome: Progressing     Problem: SAFETY ADULT  Goal: Patient will remain free of falls  Description: INTERVENTIONS:  - Educate patient/family on patient safety including physical limitations  - Instruct patient to call for assistance with activity   - Consult OT/PT to assist with strengthening/mobility   - Keep Call bell within reach  - Keep bed low and locked with side rails adjusted as appropriate  - Keep care items and personal belongings within reach  - Initiate and maintain comfort rounds  - Make Fall Risk Sign visible to staff  - Offer Toileting every 2 Hours, in advance of need  - Initiate/Maintain bedalarm  - Obtain necessary fall risk management equipment:   - Apply yellow socks and bracelet for high fall risk patients  - Consider moving patient to room near nurses station  Outcome: Progressing  Goal: Maintain or return to baseline ADL function  Description: INTERVENTIONS:  -  Assess patient's ability to carry out ADLs; assess patient's baseline for ADL function and identify physical deficits which impact ability to perform ADLs (bathing, care of mouth/teeth, toileting, grooming, dressing, etc )  - Assess/evaluate cause of self-care deficits   - Assess range of motion  - Assess patient's mobility; develop plan if impaired  - Assess patient's need for assistive devices and provide as appropriate  - Encourage maximum independence but intervene and supervise when necessary  - Involve family in performance of ADLs  - Assess for home care needs following discharge   - Consider OT consult to assist with ADL evaluation and planning for discharge  - Provide patient education as appropriate  Outcome: Progressing  Goal: Maintains/Returns to pre admission functional level  Description: INTERVENTIONS:  - Perform BMAT or MOVE assessment daily    - Set and communicate daily mobility goal to care team and patient/family/caregiver  - Collaborate with rehabilitation services on mobility goals if consulted  - Perform Range of Motion 3 times a day  - Reposition patient every 2 hours  - Dangle patient 3 times a day  - Stand patient 3 times a day  - Ambulate patient 3 times a day  - Out of bed to chair 3 times a day   - Out of bed for meals 3 times a day  - Out of bed for toileting  - Record patient progress and toleration of activity level   Outcome: Progressing     Problem: DISCHARGE PLANNING  Goal: Discharge to home or other facility with appropriate resources  Description: INTERVENTIONS:  - Identify barriers to discharge w/patient and caregiver  - Arrange for needed discharge resources and transportation as appropriate  - Identify discharge learning needs (meds, wound care, etc )  - Arrange for interpretive services to assist at discharge as needed  - Refer to Case Management Department for coordinating discharge planning if the patient needs post-hospital services based on physician/advanced practitioner order or complex needs related to functional status, cognitive ability, or social support system  Outcome: Progressing     Problem: Knowledge Deficit  Goal: Patient/family/caregiver demonstrates understanding of disease process, treatment plan, medications, and discharge instructions  Description: Complete learning assessment and assess knowledge base    Interventions:  - Provide teaching at level of understanding  - Provide teaching via preferred learning methods  Outcome: Progressing     Problem: NEUROSENSORY - ADULT  Goal: Achieves stable or improved neurological status  Description: INTERVENTIONS  - Monitor and report changes in neurological status  - Monitor vital signs such as temperature, blood pressure, glucose, and any other labs ordered   - Initiate measures to prevent increased intracranial pressure  - Monitor for seizure activity and implement precautions if appropriate      Outcome: Progressing  Goal: Remains free of injury related to seizures activity  Description: INTERVENTIONS  - Maintain airway, patient safety  and administer oxygen as ordered  - Monitor patient for seizure activity, document and report duration and description of seizure to physician/advanced practitioner  - If seizure occurs,  ensure patient safety during seizure  - Reorient patient post seizure  - Seizure pads on all 4 side rails  - Instruct patient/family to notify RN of any seizure activity including if an aura is experienced  - Instruct patient/family to call for assistance with activity based on nursing assessment  - Administer anti-seizure medications if ordered    Outcome: Progressing  Goal: Achieves maximal functionality and self care  Description: INTERVENTIONS  - Monitor swallowing and airway patency with patient fatigue and changes in neurological status  - Encourage and assist patient to increase activity and self care  - Encourage visually impaired, hearing impaired and aphasic patients to use assistive/communication devices  Outcome: Progressing     Problem: Neurological Deficit  Goal: Neurological status is stable or improving  Description: Interventions:  - Monitor and assess patient's level of consciousness, motor function, sensory function, and level of assistance needed for ADLs  - Monitor and report changes from baseline  Collaborate with interdisciplinary team to initiate plan and implement interventions as ordered  - Provide and maintain a safe environment  - Consider seizure precautions  - Consider fall precautions  - Consider aspiration precautions  - Consider bleeding precautions  Outcome: Progressing     Problem:  Activity Intolerance/Impaired Mobility  Goal: Mobility/activity is maintained at optimum level for patient  Description: Interventions:  - Assess and monitor patient barriers to mobility and need for assistive/adaptive devices  - Assess patient's emotional response to limitations  - Collaborate with interdisciplinary team and initiate plans and interventions as ordered  - Encourage independent activity per ability   - Maintain proper body alignment  - Perform active/passive rom as tolerated/ordered  - Plan activities to conserve energy   - Turn patient as appropriate  Outcome: Progressing     Problem: Communication Impairment  Goal: Ability to express needs and understand communication  Description: Assess patient's communication skills and ability to understand information  Patient will demonstrate use of effective communication techniques, alternative methods of communication and understanding even if not able to speak  - Encourage communication and provide alternate methods of communication as needed  - Collaborate with case management/ for discharge needs  - Include patient/family/caregiver in decisions related to communication  Outcome: Progressing     Problem: Potential for Aspiration  Goal: Non-ventilated patient's risk of aspiration is minimized  Description: Assess and monitor vital signs, respiratory status, and labs (WBC)  Monitor for signs of aspiration (tachypnea, cough, rales, wheezing, cyanosis, fever)  - Assess and monitor patient's ability to swallow  - Place patient up in chair to eat if possible  - HOB up at 90 degrees to eat if unable to get patient up into chair   - Supervise patient during oral intake  - Instruct patient/ family to take small bites  - Instruct patient/ family to take small single sips when taking liquids  - Follow patient-specific strategies generated by speech pathologist   Outcome: Progressing  Goal: Ventilated patient's risk of aspiration is minimized  Description: Assess and monitor vital signs, respiratory status, airway cuff pressure, and labs (WBC)    Monitor for signs of aspiration (tachypnea, cough, rales, wheezing, cyanosis, fever)  - Elevate head of bed 30 degrees if patient has tube feeding   - Monitor tube feeding  Outcome: Progressing     Problem: Nutrition  Goal: Nutrition/Hydration status is improving  Description: Monitor and assess patient's nutrition/hydration status for malnutrition (ex- brittle hair, bruises, dry skin, pale skin and conjunctiva, muscle wasting, smooth red tongue, and disorientation)  Collaborate with interdisciplinary team and initiate plan and interventions as ordered  Monitor patient's weight and dietary intake as ordered or per policy  Utilize nutrition screening tool and intervene per policy  Determine patient's food preferences and provide high-protein, high-caloric foods as appropriate  - Assist patient with eating   - Allow adequate time for meals   - Encourage patient to take dietary supplement as ordered  - Collaborate with clinical nutritionist   - Include patient/family/caregiver in decisions related to nutrition    Outcome: Progressing

## 2022-12-11 NOTE — ASSESSMENT & PLAN NOTE
· MRI cervical spine showing osteophyte complexes causing severe canal stenosis at C4-5 and C5-6 with mild cord compression      · Refer to imaging report for detailed description  · Neurosurgical follow-up as an outpatient

## 2022-12-12 NOTE — UTILIZATION REVIEW
NOTIFICATION OF INPATIENT ADMISSION   AUTHORIZATION REQUEST   SERVICING FACILITY:   [address] [place] [address] [place] [address]  Tax ID: [id number]  NPI: [id number] ATTENDING PROVIDER:  Attending Name and NPI#: Raul Pérez [[id number]]  Address: [address] [place], [place] [address] [address]  Phone: [phone]     ADMISSION INFORMATION:  Place of Service: [place] [address]  Place of Service Code: 21  Inpatient Admission Date/Time: 12/10/22  3:34 PM  Discharge Date/Time: 12/11/2022  9:45 AM  Admitting Diagnosis Code/Description:  Facial numbness [R20 0]  Expressive aphasia [R47 01]     UTILIZATION REVIEW CONTACT:  Sotero Jeans, Utilization   Network Utilization Review Department  Phone: [phone]  Fax [phone]  Email: [email or web address]  Contact for approvals/pending authorizations, clinical reviews, and discharge  PHYSICIAN ADVISORY SERVICES:  Medical Necessity Denial & Obds-ow-Bgyj Review  Phone: [phone]  Fax: [phone]  Email: [email or web address]

## 2022-12-13 ENCOUNTER — TELEPHONE (OUTPATIENT)
Dept: NEUROLOGY | Facility: CLINIC | Age: 52
End: 2022-12-13

## 2022-12-13 NOTE — TELEPHONE ENCOUNTER
Patient called to schedule her follow up after her discharge from the hospital     Per Hospital notes: Brad Martino will need follow up in in 6 weeks with general attending or advance practitioner  She will not require outpatient neurological testing  HFU/SLM 12-8-22 to 12-11-22/TIA    I offered her 3-3-23 at 915 with [facility] in [facility] per her approval and added her to the wait list and she accepted

## 2022-12-14 NOTE — UTILIZATION REVIEW
NOTIFICATION OF ADMISSION DISCHARGE   This is a Notification of Discharge from [facility]  Please be advised that this patient has been discharge from our facility  Below you will find the admission and discharge date and time including the patient’s disposition  UTILIZATION REVIEW CONTACT:  Logan Kinsey  Utilization   Network Utilization Review Department  Phone: [phone] x carefully listen to the prompts  All voicemails are confidential   Email: [email or web address]     ADMISSION INFORMATION  PRESENTATION DATE: 12/8/2022  3:10 PM  OBERVATION ADMISSION DATE: 12/08/22  INPATIENT ADMISSION DATE: 12/10/22  3:34 PM   DISCHARGE DATE: 12/11/2022  9:45 AM   DISPOSITION:Home/Self Care    IMPORTANT INFORMATION:  Send all requests for admission clinical reviews, approved or denied determinations and any other requests to dedicated fax number below belonging to the campus where the patient is receiving treatment   List of dedicated fax numbers:  [address] DENIALS (Administrative/Medical Necessity) [phone]   [address] (Maternity/NICU/Pediatrics) [phone]   [facility] [phone]   [place] [address] [phone]   [address] [phone]   220 [facility] [phone] 90 [facility] [phone]   [address] [place] [phone]   [facility]  [phone]   4053 [facility] [phone]   412 [facility] [address] [facility] [phone]

## 2022-12-14 NOTE — TELEPHONE ENCOUNTER
Sg Amherstdale advised I can schedule this patient with him, I called patient and left a voicemail advising her the new appt date and time for 1-27-23 at 1230 pm in our [facility] office

## 2022-12-29 ENCOUNTER — OFFICE VISIT (OUTPATIENT)
Dept: CARDIOLOGY CLINIC | Facility: MEDICAL CENTER | Age: 52
End: 2022-12-29

## 2022-12-29 VITALS
HEART RATE: 99 BPM | DIASTOLIC BLOOD PRESSURE: 70 MMHG | SYSTOLIC BLOOD PRESSURE: 110 MMHG | WEIGHT: 113 LBS | BODY MASS INDEX: 19.29 KG/M2 | OXYGEN SATURATION: 98 % | HEIGHT: 64 IN

## 2022-12-29 DIAGNOSIS — Q21.10 ASD (ATRIAL SEPTAL DEFECT): ICD-10-CM

## 2022-12-29 DIAGNOSIS — R07.9 CHEST PAIN AT REST: ICD-10-CM

## 2022-12-29 DIAGNOSIS — I10 PRIMARY HYPERTENSION: ICD-10-CM

## 2022-12-29 DIAGNOSIS — G45.9 TIA (TRANSIENT ISCHEMIC ATTACK): ICD-10-CM

## 2022-12-29 DIAGNOSIS — E78.5 HYPERLIPIDEMIA, UNSPECIFIED HYPERLIPIDEMIA TYPE: ICD-10-CM

## 2022-12-29 DIAGNOSIS — Z87.74 HISTORY OF PERCUTANEOUS TRANSCATHETER CLOSURE OF CONGENITAL ASD: ICD-10-CM

## 2022-12-29 DIAGNOSIS — E13.319 DIABETIC RETINOPATHY ASSOCIATED WITH DIABETES MELLITUS OF OTHER TYPE, MACULAR EDEMA PRESENCE UNSPECIFIED, UNSPECIFIED LATERALITY, UNSPECIFIED RETINOPATHY SEVERITY (HCC): ICD-10-CM

## 2022-12-29 DIAGNOSIS — I65.21 CAROTID STENOSIS, ASYMPTOMATIC, RIGHT: ICD-10-CM

## 2022-12-29 DIAGNOSIS — E11.42 TYPE 2 DIABETES MELLITUS WITH DIABETIC POLYNEUROPATHY, WITHOUT LONG-TERM CURRENT USE OF INSULIN (HCC): ICD-10-CM

## 2022-12-29 DIAGNOSIS — R20.2 PARESTHESIAS: Primary | ICD-10-CM

## 2022-12-29 NOTE — PROGRESS NOTES
Cardiology Consultation Visit       Dulce De Leon   [id number]  1970      [facility] [place]  [facility] [address] [place] [id number]      Physician Requesting Consult:   Yaritza Stanton,     Reason for Consultation / Principal Problem:  Mrs Dulce De Leon is a 47 y o  female and never smoker  with a PMH significant for but not limited to Carotid Stenosis, RHF (2013, prior to ASD closure), ASD (s/p Amplatzer Occluder, 9-ASD-026, 2013), DMII, DM Retinopathy, HTN, HLD, Breast Cancer (2013, Left s/p Lumpectomy with Radiaion x32 rounds) and Pituitary lesion (MRI 12/2022)  She who presents to clinic on Hospital Follow Up for Carotid Stenosis and Paresthesias  Of note, the patient's cardiac risk factor(s) include: diabetes mellitus, dyslipidemia and hypertension  History of Present Illness:  Mrs Ibarra was at her baseline state of health: independent of adl's, 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 , though not exercising regularly, when she developed a sudden onset of numbness and confusion  She states that she was at work when she developed tingling and numbness in her face  She remembers trying to write a list of her co-workers and then not being able to recall their names  She originally thought her sugar levels were too high, but she was seen by her work nurse and her sugar level was only in the 170s  She ultimately returned to her desk with progression of her symptoms  She was seen by her PCP the next day with concerns for a TIA  She was sent to [facility] for further evaluation  She was evaluated by Neurosurgery for her paresthesias with an abnormal MRI demonstrating cervical stenosis  She was also found to have carotid stenosis incidentally  She notes that her confusion has resolved, but her facial and bue/ble paraesthesia severity waxes and wanes    She also reports increased stress at home with the recent diagnosis of cancer with her spouse  When she is overwhelmed at times, she experiences a left chest pressure that does not radiate  She denies any associated diaphoresis, n/v, sob, minaya, palpitations, near syncope, syncope, orthopnea, pnd, or ble edema  She also denies any prior cardiac history beyond her ASD with Repair, family history of early cad, family history of scd, or any other recent hospitalizations  She is compliant with her medications, is working to better understand her insulin requirements, and she checks her BP occasionally at home  Assessment & Plan   1  CP, etiology likely noncardiac given hpi and prior CCS/TTE  a  Symptoms were transient and associated with stress  b  While patient has risk factors for cad and known carotid stenosis, will hold on ischemic evaluation at this time, cont aggressive risk factor modification for now: repeat flp/a1c per pcp, bp log as noted, diet and lifestyle changes  c  Will reassess at next visit with patient to call should her symptoms progress    2  Paresthesias, etiology unclear at this time   a  Symptoms have been ongoing with waxing/waning severity  b  Patient is follow with neurology and neurosurgery at this time and may need further auotoimmune given noted joint swelling on exam and chronically elevated CRP  c  Monitoring symptoms for now, but will follow peripherally    3  Carotid Stenosis, last imaging with mod to severe JAIR stenosis by CTA on 12/08/22  a  Tolerating asa/statin therapy  b  Cont current regimen for now  c  Monitoring routinely as noted above    4  HTN, initially started on ACEi in 2013 due to DMII, now normotensisve on ARB  a  BP log review with home SBP in the 115-125 mmHg range  b  Cont current home medication regimen  c  Plan for BP log recheck on follow up visit    5  HLD, most recent FLP:   HDL 73 LDL 78 on 12/812  a  Tolerating statin, will continue  b    on diet/lifestyle modifications  c  Repeat FLP per PCP, will follow peripherally    6  DMII, diagnosed in 2013, most recent A1c 9 2 on 12/8/22  a  Tolerating med mgmt per metformin, dulaglutide, and the initiation of insulin  b  Cont current medication regimen with counseling insulin administration and diet/lifestyle changes  c  Repeat A1c as ordered by PCP, will follow peripherally    7  ASD, s/p Amplatzer Occluder, 9-ASD-026, 2013 with Right Side Heart Failure resolved after intervention  a  Stable, asymptomatic, recent 12/09/22 TTE with ASD closure device in place without evidence of leak  b  No plans for further intervention at this time  c  Monitoring routinely for now    Discussion / Summary:  Precautions and reasons to call our office or proceed to ER were discussed in detail  Patient expressed understanding and questions were answered  As noted above, plan on follow up in-clinic in 6 months  Office Visit Diagnosis:  1  Paresthesias        2  Chest pain at rest        3  TIA (transient ischemic attack)        4  Carotid stenosis, asymptomatic, right        5  Primary hypertension        6  Hyperlipidemia, unspecified hyperlipidemia type        7  Type 2 diabetes mellitus with diabetic polyneuropathy, without long-term current use of insulin ([address] )        8  Diabetic retinopathy associated with diabetes mellitus of other type, macular edema presence unspecified, unspecified laterality, unspecified retinopathy severity ([address] )        9  History of percutaneous transcatheter closure of congenital ASD        10  ASD (atrial septal defect)            Subjective   Review of Systems:  Constitutional: Negative for chills and fever  HENT: Negative for rhinorrhea and sore throat  Eyes: Positive for visual disturbance (blurry, last eye exam 1/2022, annual scheduled)  Negative for pain  Respiratory: Negative for cough, shortness of breath and wheezing      Cardiovascular: Positive for chest pain (left chest discomfort, occurred when spouse was hypotensive (bp 43/35) and taken to hospital, patient was very anxious, she had another episode today en route to today's visit because she did not want to be late)  Negative for palpitations and leg swelling  Gastrointestinal: Negative for abdominal pain, constipation, diarrhea, nausea and vomiting  Genitourinary: Negative for dysuria and hematuria  Musculoskeletal: Positive for arthralgias (left index finger, feels swollen) and joint swelling (index finger)  Negative for back pain and myalgias  Skin: Negative for rash  Neurological: Positive for dizziness and numbness (face, left leg more than right, left index finger)  Negative for syncope and light-headedness  Hematological: Does not bruise/bleed easily  Psychiatric/Behavioral: The patient is nervous/anxious (spouse diagnosed with esophageal cancer)        The following portions of the patient's history were reviewed and updated as appropriate: past medical history, past social history, past family history, past surgical history, current medications, allergies, past surgical history and problem list     Past Medical History:   Diagnosis Date   • Cancer ([facility] )     breast   • Diabetes mellitus ([place] [address] )    • Hypertension        Social History     Socioeconomic History   • Marital status: /Civil Union     Spouse name: Not on file   • Number of children: Not on file   • Years of education: Not on file   • Highest education level: Not on file   Occupational History   • Not on file   Tobacco Use   • Smoking status: Never   • Smokeless tobacco: Never   Vaping Use   • Vaping Use: Never used   Substance and Sexual Activity   • Alcohol use: Yes     Comment: socially   • Drug use: No   • Sexual activity: Not Currently     Birth control/protection: Abstinence   Other Topics Concern   • Not on file   Social History Narrative   • Not on file     Social Determinants of Health     Financial Resource Strain: Not on file   Food Insecurity: No Food Insecurity   • Worried About Running Out of Food in the Last Year: Never true   • Ran Out of Food in the Last Year: Never true   Transportation Needs: No Transportation Needs   • Lack of Transportation (Medical): No   • Lack of Transportation (Non-Medical): No   Physical Activity: Not on file   Stress: Not on file   Social Connections: Not on file   Intimate Partner Violence: Not on file   Housing Stability: Unknown   • Unable to Pay for Housing in the Last Year: No   • Number of Places Lived in the Last Year: Not on file   • Unstable Housing in the Last Year: No     History reviewed  No pertinent family history  Past Surgical History:   Procedure Laterality Date   • DILATION AND CURETTAGE OF UTERUS     • LYMPHADENECTOMY         Current Outpatient Medications:   •  acetaminophen (TYLENOL) 325 mg tablet, Take 2 tablets (650 mg total) by mouth every 6 (six) hours as needed for mild pain or fever, Disp: 30 tablet, Rfl: 0  •  ascorbic acid (VITAMIN C) 500 MG tablet, Take 1 tablet (500 mg total) by mouth daily for 15 days, Disp: 15 tablet, Rfl: 0  •  aspirin 81 mg chewable tablet, Chew 1 tablet (81 mg total) daily Do not start before December 11, 2022 , Disp: 30 tablet, Rfl: 0  •  atorvastatin (LIPITOR) 20 mg tablet, Take 20 mg by mouth daily, Disp: , Rfl:   •  dextromethorphan-guaiFENesin (ROBITUSSIN DM)  mg/5 mL syrup, Take 10 mL by mouth every 4 (four) hours as needed for cough, Disp: 118 mL, Rfl: 0  •  Dulaglutide (Trulicity) 0 55 SB/4 0BY SOPN, Inject 0 75 mg under the skin Once a week, Disp: , Rfl:   •  ferrous sulfate 325 (65 Fe) mg tablet, Take 325 mg by mouth daily with breakfast, Disp: , Rfl:   •  Insulin Glargine-yfgn 100 UNIT/ML SOPN, Inject 10 Units under the skin in the morning Every 3rd day takes 12 units  , Disp: , Rfl:   •  losartan (COZAAR) 25 mg tablet, Take 25 mg by mouth daily, Disp: , Rfl:   •  metFORMIN (GLUCOPHAGE) 1000 MG tablet, Take 1,000 mg by mouth daily with dinner, Disp: , Rfl:   •  vitamin B-12 (CYANOCOBALAMIN) 500 MCG TABS, Take 1 tablet (500 mcg total) by mouth daily, Disp: 30 tablet, Rfl: 1    Allergies   Allergen Reactions   • Dapagliflozin      Facial swelling       Patient Active Problem List   Diagnosis   • SIRS (systemic inflammatory response syndrome) ([facility])   • Hypertension   • Hyperlipidemia   • Viral respiratory infection   • Type 2 diabetes mellitus, without long-term current use of insulin ([facility])   • COVID-19 virus detected   • TIA (transient ischemic attack)   • Paresthesias   • Carotid stenosis, asymptomatic, right   • Abnormal MRI, cervical spine   • ASD (atrial septal defect)   • History of percutaneous transcatheter closure of congenital ASD       Objective     Vitals:    12/29/22 0743   BP: 110/70   BP Location: Left arm   Patient Position: Sitting   Cuff Size: Adult   Pulse: 99   SpO2: 98%   Weight: 51 3 kg (113 lb)   Height: 5' 4" (1 626 m)     Body mass index is 19 4 kg/m²  Physical Exam:  Constitutional:       General: She is not in acute distress  Appearance: Normal appearance  She is normal weight  She is not ill-appearing or toxic-appearing  HENT:      Head: Normocephalic and atraumatic  Right Ear: External ear normal       Left Ear: External ear normal    Eyes:      General: No scleral icterus  Right eye: No discharge  Left eye: No discharge  Neck:      Vascular: No carotic bruit present  Cardiovascular:      Rate and Rhythm: Normal rate and regular rhythm  Pulses: Normal pulses  Heart sounds: Normal heart sounds  No murmur heard  No gallop  Pulmonary:      Effort: Pulmonary effort is normal  No respiratory distress  Breath sounds: Normal breath sounds  No wheezing or rales  Musculoskeletal:      Cervical back: Neck supple  Right lower leg: No edema  Left lower leg: No edema  Skin:     General: Skin is warm and dry  Capillary Refill: Capillary refill takes less than 2 seconds  Coloration: Skin is not jaundiced        Findings: No bruising or lesion  Neurological:      General: No focal deficit present  Mental Status: She is alert and oriented to person, place, and time  Psychiatric:         Mood and Affect: Mood normal          Behavior: Behavior normal      Labs:  Lab Results   Component Value Date    K 4 4 12/10/2022    K 3 9 12/09/2022    K 5 1 12/08/2022    CO2 26 12/10/2022    CO2 27 12/09/2022    CO2 27 12/08/2022    BUN 18 12/10/2022    BUN 14 12/09/2022    BUN 11 12/08/2022    CREATININE 0 55 (L) 12/10/2022    CREATININE 0 51 (L) 12/09/2022    CREATININE 0 54 (L) 12/08/2022    CALCIUM 9 0 12/10/2022    CALCIUM 9 4 12/09/2022    CALCIUM 9 9 12/08/2022     Lab Results   Component Value Date    TROPONINI <0 02 04/01/2020     Lab Results   Component Value Date    WBC 4 23 (L) 12/10/2022    WBC 4 31 12/09/2022    WBC 5 70 12/08/2022    HGB 14 2 12/10/2022    HGB 14 2 12/09/2022    HGB 14 7 12/08/2022    HCT 42 5 12/10/2022    HCT 43 0 12/09/2022    HCT 43 5 12/08/2022    MCV 89 12/10/2022    MCV 89 12/09/2022    MCV 88 12/08/2022     12/10/2022     12/09/2022     12/08/2022     Lab Results   Component Value Date    TRIG 143 12/08/2022    HDL 73 12/08/2022       Lab Results   Component Value Date    HGBA1C 9 2 (H) 12/08/2022    HGBA1C 9 1 (H) 11/01/2022    HGBA1C 9 3 (H) 07/02/2022    HGBA1C 7 6 (H) 02/26/2022     Imaging:  Cardiac CTA 4/22/2013: INTERPRETATION SUMMARY:   1  Total coronary artery calcium score is 0    2  Normal CT coronary angiogram    3  Large secundum-type atrial septal defect with an aneurysmal interatrial septum that may have fenestrations  QP:QS 2:1   4  No associated VSD, or pulmonary venous abnormalities seen  CTA head and neck with and without contrast    Result Date: 12/8/2022  Impression: 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)  Cervical right ICA is asymmetrically smaller likely due to central stenosis   No other intracranial stenosis, large vessel occlusion or aneurysm  No significant stenosis of the cervical carotid or vertebral arteries  Mild chronic microangiopathy  No acute infarct, hemorrhage or mass effect  Borderline prominent pituitary gland, suggest nonemergent follow-up with MRI brain and sella  The study was marked in [facility] for immediate notification  Workstation performed: [id number]     MRI brain wo contrast  Result Date: 12/9/2022  Impression: 1  No acute ischemia  2   Sequela of old lacunar infarct in the right centrum semiovale  Mild microangiopathic change  3   Incidental 0 7 cm T1 hyperintense lesion within posterior pituitary gland, could be cystic microadenoma versus Rathke's cleft cyst   Recommend correlation with hormonal levels and follow-up with pituitary protocol brain MRI  Workstation performed: [id number]     MRI brain w contrast  Result Date: 12/10/2022  Impression: Stable 0 6 x 0 7 x 1 1 cm 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 measuring  without definite enhancement  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 but endocrinological evaluation and follow-up is recommended  No abnormal enhancement of the brain  Workstation performed: [id number]     MRI cervical spine w wo contrast  Result Date: 12/10/2022  Impression: Degenerative spondylosis with disc osteophyte complexes causing severe canal stenosis at C4-5 and C5-6 with mild cord compression  Tiny focus of myelomalacia suggested at C5-6  Moderate canal stenosis at C6-7 with at least moderately severe foraminal stenosis  Workstation performed: [id number]     Previous EKG/TELE/Holter:  EKG:   Normal sinus rhythm    Tele:   No results found for this or any previous visit  Holter:   No results found for this or any previous visit  Previous Cath/PCI:  No results found for this or any previous visit  Previous STRESS TEST:  No results found for this or any previous visit      ECHO:  No results found for this or any previous visit  Results for orders placed during the hospital encounter of 12/08/22    Echo complete w/ contrast if indicated    Interpretation Summary  •  Left Ventricle: Left ventricular cavity size is normal  Wall thickness is normal  The left ventricular ejection fraction is 60%  Systolic function is normal  Wall motion is normal  Diastolic function is normal   •  Atrial Septum: There is an interatrial septal occluder device noted  No evidence of color flow across the interatrial septum  •  Tricuspid Valve: There is mild regurgitation  TTE  3/5/2013 ECHO  Interpretation Summary  The qualitative LV ejection fraction is 55-59% (normal)  The right ventricular cavity size is enlarged (proximal parasternal long axis RV outflow tract dimension  > 3 3 cm)  The right ventricular systolic function is normal as assessed by tricuspid annular plane systolic  excursion (TAPSE) (normal >1 5 cm)  The right atrium is enlarged (> 18 cm^2)  There is a possible atrial septal defect  There is an atrial septal aneurysm  Compared to last available study, there has been no interval change  Recommend cardiology  consultation & FABRIZIO for further evaluation  TTE 01/30/2008 5:48 PM EST  IMPRESSIONS AND SUMMARY:  1  Normal left ventricular size and vigorous systolic functi on with no regional wall motion abnormalities  Estimated ejection fraction 65-70%  2  Top normal to minimally dilated left atrium  3  Prominent right ventricle with normal right ventricular systolic function and moderate pulmonary hypertension (peak pulmonary artery pressure 44 mmHg)  4  Top normal to mildly dilated right atrium with mild tricuspid  regurgitation  5  No anitha ral valve prolapse demonstrated  6  Normal tissue Doppler  7  When comparison is made to the previous Echo of 8 February 2005, there has been an increase in the size of the left and right atrium, which are now top normal to mildly dilated  The pulmonary hypertension is unchanged   The right ventricle appears somewhat more prominent on today's exam  Correlation with the patient's clinical history is required  TTE 02/09/2005 10:51 AM EST  IMPRESSIONS AND SUMMARY:  1  Normal left ventricular size and systolic function with no regional wall motion abnormalities  Estimated ejection fraction = 65-70%  2  Normal left atrial size  3  Normal right ventricular size with mild to moderate pulmonary  hypertension  4  Normal right atrial size with very mild (1+) tricuspid regurgitation  5  No mitral valve prolapse demonstrated  Margarita Hurtado MD, [facility]    FABRIZIO:  FABRIZIO 4/22/2013: Interpretation Summary  The study was performed without complications  The examination is adequate to evaluate the referral indication  There is large left to right interatrial shunt  There is an atrial septal aneurysm  Appearance suggests large secundum AS as as well as the fenestrated ASA  There is no ventricular septal defect  The qualitative LV ejection fraction is 55-59% (normal)  The left ventricular cavity size is normal   The right ventricular cavity is larger than the left ventricular cavity (4 chamber) indicating significant  RV enlargement  The right atrium is moderately enlarged  There was normal sinus rhythm during the examination  Moderate tricuspid regurgitation is present  The atrial septum is fenestrated with multiple defects  Cannot exclude bidirectional shunting  PASP not well assessed  The right ventricular systolic function is qualitatively normal     CMR:  No results found for this or any previous visit      Counseling / Coordination of Care:  During our visit, I spent 40 minutes with the patient, and greater than 50% of this time was spent on counseling and coordination of care, including addressing diagnostic results, prognosis, risks and benefits of treatment options, instructions for management, patient/family education, importance of treatment compliance, along with risk factor reductions  Dictation Disclaimer: This note was completed in part utilizing m-Vdancer fluency direct voice recognition software  Grammatical errors, random word insertion, spelling mistakes, and incomplete sentences may be an occasional consequence of the system secondary to software limitations, ambient noise and hardware issues  At the time of dictation, efforts were made to edit, clarify and /or correct errors  Please read the chart carefully and recognize, using context, where substitutions have occurred  If you have any questions or concerns about the context, text or information contained within the body of this dictation, please contact myself, the provider, for further clarification       Abby Valdez, DO 12/29/22

## 2023-01-04 ENCOUNTER — OFFICE VISIT (OUTPATIENT)
Dept: NEUROSURGERY | Facility: CLINIC | Age: 53
End: 2023-01-04

## 2023-01-04 ENCOUNTER — TELEPHONE (OUTPATIENT)
Dept: NEUROSURGERY | Facility: CLINIC | Age: 53
End: 2023-01-04

## 2023-01-04 VITALS
TEMPERATURE: 98.4 F | HEART RATE: 98 BPM | HEIGHT: 64 IN | BODY MASS INDEX: 19.29 KG/M2 | OXYGEN SATURATION: 99 % | DIASTOLIC BLOOD PRESSURE: 76 MMHG | RESPIRATION RATE: 16 BRPM | SYSTOLIC BLOOD PRESSURE: 118 MMHG | WEIGHT: 113 LBS

## 2023-01-04 DIAGNOSIS — G95.20 CORD COMPRESSION (HCC): ICD-10-CM

## 2023-01-04 DIAGNOSIS — M48.02 STENOSIS OF CERVICAL SPINE WITH MYELOPATHY (HCC): ICD-10-CM

## 2023-01-04 DIAGNOSIS — R90.89 ABNORMAL FINDING ON MRI OF BRAIN: Primary | ICD-10-CM

## 2023-01-04 DIAGNOSIS — G99.2 STENOSIS OF CERVICAL SPINE WITH MYELOPATHY (HCC): ICD-10-CM

## 2023-01-04 DIAGNOSIS — E11.9 TYPE 2 DIABETES MELLITUS, WITHOUT LONG-TERM CURRENT USE OF INSULIN (HCC): ICD-10-CM

## 2023-01-04 DIAGNOSIS — M48.02 CERVICAL STENOSIS OF SPINAL CANAL: ICD-10-CM

## 2023-01-04 NOTE — TELEPHONE ENCOUNTER
SPOKE WITH PT ADVISED ENDO APT TOMORROW GAVE PT ADDRESS TO [place] OFFICE ALSO ADVISED PT SHE WILL NEED TO GET CSPINE XRAY 2-3 DAYS BEFORE HER APT IN 6 MONTHS SHE IS AWARESCRIPT  IS IN THE SYSTEM -BA

## 2023-01-04 NOTE — PROGRESS NOTES
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 but endocrinological evaluation and follow-up is recommended  No abnormal enhancement of the brain  · 12/9/2022 MRI brain wo contrast Incidental 0 7 cm T1 hyperintense lesion within posterior pituitary gland, could be cystic microadenoma versus Rathke's cleft cyst   Recommend correlation with hormonal levels and follow-up with pituitary protocol brain MRI  Pituitary stalk is also displaced anteriorly and superiorly  Cavernous sinuses enhance normally  SELLA AND PITUITARY GLAND:  There is a 0 7 cm T1 hyperintense lesion within posterior pituitary gland (series 4 image 12)    PLAN  · Reviewed MRI brain w/wo  · Explained indicated f/u   · Ordered pituitary labs  · Ordered visual fields w/ [place] [facility] associated   · RTO in 2-3 weeks with completed visual fields and labs   · Will plan follow up pituitary imagining in 6 - 12 months from initial imagining 12/10/22 if pituitary labs normal as per protocol for Rathke Cleft , order at next in 2-3 weeks   · Advised to al the office with additional questions or concerns  Type 2 diabetes mellitus, without long-term current use of insulin ([facility] )    Lab Results   Component Value Date    HGBA1C 9 2 (H) 12/08/2022     · Referral to Endocrine  Check out secured an appointment after patient left office , she was aware of plan,   · Appointment for January 5 2022 @ 1400 w/ Dr Leana Oh , [place]     Cord compression [facility])  · As addressed in MRI cervical spine myelomalacia   · Symptoms of mild myelopathy  --as addressed in HPI       Plan   · Neurosurgery intervention indicated   · RTO appointment scheduled for 6 moth f/u   · RTO sooner if Red flag symptoms --of progressive myelopathy     Stenosis of cervical spine with myelopathy (HCC)  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, writes using right hand  · Balance --use ralings when descending steps,Feels balance is more off when descending steps  Denies caudia equnia problems affecting bowel, bladde or saddle anesthesia  Imagining  12/9/2022 MRI cervical spine w/wo    Degenerative spondylosis with disc osteophyte complexes causing severe canal stenosis at C4-5 and C5-6 with mild cord compression  Tiny focus of myelomalacia suggested at C5-6  Moderate canal stenosis at C6-7 with at least moderately severe foraminal stenosis  Reviewed in collaboration with Dr Nehal Valverde ---neurosurgical intervention indicated  Plan  Reviewed MRI cervical spine in detail highlighting surgical pathology   DR Nehal Valverde met with patient and explained the following   · Surgical Pathology on MRI   · Neurosurgery plan for intervention  Surgical procedure ( 3 level ACDF  Risks, benefits , goal  Overnight hospital stay  With next day admit   Wear [place] cervical collar for about 4 weeks       · Advised to avoid falling , cervical hyperextension, minimize cervical ROM, avoid rear end trauma avoid heavy lifting greater alia 10-20 lbs   · Schedule f/uoffice visit in 6 months sooner if myelopathic symptoms worsen  · Reviewed Red flag symptoms /symptoms of worsening myelopathy   · All with additional questions or concerns     · Need several clearances for comorbid condition  As surgical risk --as [place] HPI   · If additional questions or concerns call the office  Subjective: Referral from ED for cervical compression  Patient ID: Sheryle Sang is a 47 y o  female PM/SH DM2 controlled HgA1C 9 2 , Atrial septal defect  Status post repair transcatheter  closure  of congential  Caroid artery stenosis asymptomatic, HLD , aresthesia, TIA, HTN, DCIS-Ductal cancer-malignant neoplasm left breast, anemia    HPI   Seen in the ED 12/8/2022  For memory loss could no remember names of coworkers, had difficultly writing names of coworkers felt as though hand and brain wa snot working together  Has TIA symptoms , numbness of the ead and occipital area into frontal, numbness in feet with distribution up into  Legs , more pronounced on the left side  Called her PCP advised to drive to the ED  Examined in ED then admitted 12/8/22  Through 12/1/22,,     Imagining showed severe cervical stenosis w/ cord compression and myelomalacia  and pituitary mass she was referred to neurosurgery for outpatient office follow-up     Presents for Cervical Spine workup  Reports pain in posterior cervical area with distribution into bilateral shoulders,associated with  numbness in fingers  The second finger is constant while other fingers are intermittent , no numbness in the 5th finger  Reports when turning neck has cracking  She denies radcular pain down both arms --none ---numbness 2nd finger constatnt   Pain distribution from the neck up into the base of the skull  Symptoms worse on the left  Several years ago had bilateral shoulder pain and discomfort secondary to repetitive movement of bilateral shoulders  Received physical therapy for the shoulder pain with movement, muscle soreness, joint pain,  decreasd movemnet in shoulders  Changed job, is  no longer performing repetitive shoulder movement  Onset of symptoms 1 week ago started with neck pain   The neck pain has been going on for some time but is worsening and is more evident causing distress  She reports the numbness in her fingers started since recent hospitalization        Inciting factors nothing, symptoms just occurred over time  Severity of symptoms as 8-9/10    Quality  -shoulder-like muscle constriction tight , cervical and head symptoms are  Aching, not stabbing, are difficult to describe  Fingers has numbness mostly wiith intermittent tingling , swelling in the second finger of left hand  Aggrvating factor   ---lying in bed on 1 pillow is worse , cervical roatation causes pian up into neck , elevation of left arm at shoulder above head causes pain into shoulder and into neck  Releiving --- avoid pillow use and functions that are aggravating symptoms , nothing else  [place] treatments   None   PT --- none , received therapy several years ago for shoulder pain  As above   ,  No injections    Medicnal --- acteominophen ---prn when sever 500 mg 3 tabs  q 4 hours  about 3 doses per day   CAM none      Co Morbid conditions as surgical risk   · DM2 uncontrolled , HgA1C 9 2--currently managed by PCP --referral to endocrine ---need HgA1C of < 7 5 to proceed with surgery  · Vascular --- carotid artery stenosis severe left ICA, asymptomaticneed to complete vascular surgery visit , ordered during recent inpatient   Scheduled on 1/23/2023   · Neurology --scheduled 3/3/2023 old lacunar infarct---consult in [facility] 12/8/22 cotinue ASA and increased lipitor  omsulted vascular consult, Pituitary mass     · Cardiology-Dr Ela Clark cardiology [facility]---10/19/2017 note excerpy Diagnosed with secundum ASD 4/2013 with a significant L to R shunt  She underwent ASD closure on 12/2013 at [facility] and was last seen by Cardiology 10/2014 at [facility] with an echo as noted below  Cardiology follow up was not recommended or needed  Social --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  I  ,  reently diagnosed with esophageal cancer       Dr Awilda Narayanan and I have spent 61 minutes with patient today in which greater than 50% of this time was spent in counseling/coordination of care regarding proposed surgical procedure options and impressions, imagining review, explaining risks and benefits and surgical procedure, all questions were answered in detail, patient verbalized an understanding and was provided with contact information if additional questions arise           REVIEW OF SYSTEMS  Review of Systems   Constitutional: Positive for chills  HENT: Negative  Eyes: Positive for visual disturbance (blurriness)  Respiratory: Negative  Cardiovascular: Negative  Gastrointestinal: Negative  Endocrine: Negative  Menopausal, sweats last night   Genitourinary: Negative  Musculoskeletal: Positive for neck pain  Negative for gait problem  Skin: Negative  Allergic/Immunologic: Positive for food allergies  Neurological: Positive for weakness (left hand), numbness (left fingers, index finger constant  also gets imtermittent head numbness and L>R legs) and headaches  Hematological: Bruises/bleeds easily (meds)  Psychiatric/Behavioral: The patient is nervous/anxious  Mental status changes when presented to hospital in December  Still feeling forgetful, memory difficulty         Meds/Allergies     Current Outpatient Medications   Medication Sig Dispense Refill   • ascorbic acid (VITAMIN C) 500 MG tablet Take 1 tablet (500 mg total) by mouth daily for 15 days 15 tablet 0   • aspirin 81 mg chewable tablet Chew 1 tablet (81 mg total) daily Do not start before December 11, 2022  30 tablet 0   • atorvastatin (LIPITOR) 20 mg tablet Take 20 mg by mouth daily     • Dulaglutide (Trulicity) 5 76 EJ/9 5EJ SOPN Inject 0 75 mg under the skin Once a week     • ferrous sulfate 325 (65 Fe) mg tablet Take 325 mg by mouth daily with breakfast     • Insulin Glargine-yfgn 100 UNIT/ML SOPN Inject 10 Units under the skin in the morning Every 3rd day takes 12 units       • losartan (COZAAR) 25 mg tablet Take 25 mg by mouth daily     • metFORMIN (GLUCOPHAGE) 1000 MG tablet Take 1,000 mg by mouth daily with dinner     • vitamin B-12 (CYANOCOBALAMIN) 500 MCG TABS Take 1 tablet (500 mcg total) by mouth daily 30 tablet 1   • acetaminophen (TYLENOL) 325 mg tablet Take 2 tablets (650 mg total) by mouth every 6 (six) hours as needed for mild pain or fever (Patient not taking: Reported on 1/4/2023) 30 tablet 0   • dextromethorphan-guaiFENesin (ROBITUSSIN DM)  mg/5 mL syrup Take 10 mL by mouth every 4 (four) hours as needed for cough (Patient not taking: Reported on 1/4/2023) 118 mL 0     No current facility-administered medications for this visit  Allergies   Allergen Reactions   • Dapagliflozin      Facial swelling       PAST HISTORY    Past Medical History:   Diagnosis Date   • Cancer ([facility] )     breast   • Diabetes mellitus ([facility] )    • Hypertension        Past Surgical History:   Procedure Laterality Date   • BREAST LUMPECTOMY Left    • DILATION AND CURETTAGE OF UTERUS     • LYMPHADENECTOMY         Social History     Tobacco Use   • Smoking status: Never   • Smokeless tobacco: Never   Vaping Use   • Vaping Use: Never used   Substance Use Topics   • Alcohol use: Yes     Comment: socially   • Drug use: No       Family History   Problem Relation Age of Onset   • Diabetes Mother        The following portions of the patient's history were reviewed and updated as appropriate: allergies, current medications, past family history, past medical history, past social history, past surgical history and problem list       EXAM    Vitals:Blood pressure 118/76, pulse 98, temperature 98 4 °F (36 9 °C), temperature source Tympanic, resp  rate 16, height 5' 3 5" (1 613 m), weight 51 3 kg (113 lb), SpO2 99 %  ,Body mass index is 19 7 kg/m²  Physical Exam  Vitals and nursing note reviewed  Constitutional:       General: She is not in acute distress  Appearance: Normal appearance  She is obese  She is not ill-appearing, toxic-appearing or diaphoretic  HENT:      Head: Normocephalic and atraumatic  Eyes:      General: No scleral icterus  Right eye: No discharge  Extraocular Movements: Extraocular movements intact  Conjunctiva/sclera: Conjunctivae normal    Pulmonary:      Effort: Pulmonary effort is normal  No respiratory distress  Musculoskeletal:         General: No tenderness  Right lower leg: No edema  Left lower leg: No edema  Comments: Limitation cervical ROM secondary pain   Skin:     General: Skin is warm and dry  Neurological:      Mental Status: She is alert and oriented to person, place, and time  Sensory: No sensory deficit  Motor: Weakness present  Coordination: Coordination abnormal  Romberg Test abnormal  Heel to Shin Test normal  Finger-nose-finger test: slow right damon , is amidextrus , writed using righ , left for other functions  Gait: Gait abnormal  Tandem gait test: slight imbalance       Deep Tendon Reflexes: Reflexes normal       Reflex Scores:       Tricep reflexes are 2+ on the right side and 2+ on the left side  Bicep reflexes are 2+ on the right side and 2+ on the left side  Brachioradialis reflexes are 2+ on the right side and 2+ on the left side  Patellar reflexes are 2+ on the right side and 2+ on the left side  Achilles reflexes are 1+ on the right side and 1+ on the left side  Psychiatric:         Mood and Affect: Mood normal          Behavior: Behavior normal          Neurologic Exam     Mental Status   Oriented to person, place, and time     Level of consciousness: alert    Motor Exam   Right arm tone: normal  Left arm tone: normal  Right leg tone: normal  Left leg tone: normal    Strength   Left neck extension: 5/5  Right deltoid: 5/5  Left deltoid: 4/5  Right biceps: 5/5  Left biceps: 5/5  Right triceps: 4/5  Left triceps: 5/5  Right wrist flexion: 4/5  Left wrist flexion: 5/5  Right wrist extension: 4/5  Left wrist extension: 5/5  Right interossei: 4/5  Left interossei: 5/5  Right iliopsoas: 5/5  Left iliopsoas: 5/5  Right quadriceps: 5/5  Left quadriceps: 5/5  Right hamstrin/5  Left hamstrin/5  Right anterior tibial: 5/5  Left anterior tibial: 5/5  Right gastroc: 5/5  Left gastroc: 5/5Limitation in cervical flexion and extension --pain with cervical side to side rotation, cervical extension pain down midline spine, Lhermitte sign      Sensory Exam   Right arm light touch: normal  Left arm light touch: normal  Right leg light touch: normal  Left leg light touch: normal  Right arm vibration: normal  Left arm vibration: normal  Right leg vibration: normal  Left leg vibration: normal  Proprioception normal      Gait, Coordination, and Reflexes     Coordination   Romberg: positive  Finger-nose-finger test: slow right damon , is amidextrus , writed using righ , left for other functions  Heel to shin coordination: normal  Tandem gait test: slight imbalance     Reflexes   Right brachioradialis: 2+  Left brachioradialis: 2+  Right biceps: 2+  Left biceps: 2+  Right triceps: 2+  Left triceps: 2+  Right patellar: 2+  Left patellar: 2+  Right achilles: 1+  Left achilles: 1+  Right Romo: absent  Left Romo: absent  Right ankle clonus: absent  Left pendular knee jerk: absentCan heel and toe walk        Imaging Studies  CTA head and neck with and without contrast  Result Date: 2022  Narrative: CTA NECK AND BRAIN WITH AND WITHOUT CONTRAST INDICATION: facial numbness, expressive aphasia - resolved COMPARISON:   None  TECHNIQUE:  Routine CT imaging of the Brain without contrast   Post contrast imaging was performed after administration of iodinated contrast through the neck and brain  Post contrast axial 0 625 mm images timed to opacify the arterial system  3D rendering was performed on an independent workstation  MIP reconstructions performed   Coronal reconstructions were performed of the noncontrast portion of the brain  Radiation dose length product (DLP) for this visit:  1230 mGy-cm   This examination, like all CT scans performed in the [facility], was performed utilizing techniques to minimize radiation dose exposure, including the use of iterative reconstruction and automated exposure control  IV Contrast:  85 mL of iohexol (OMNIPAQUE)  IMAGE QUALITY:   Diagnostic FINDINGS: NONCONTRAST BRAIN PARENCHYMA:  No intracranial mass, mass effect or midline shift  No CT signs of acute infarction  No acute parenchymal hemorrhage  Minimal nonspecific white matter changes may be due are likely due to chronic microangiopathy given history of hypertension  No acute infarct, hemorrhage, mass or mass effect  Borderline prominent heterogeneous hyperdense pituitary gland  VENTRICLES AND EXTRA-AXIAL SPACES:  Normal for the patient's age  VISUALIZED ORBITS AND PARANASAL SINUSES:  Normal visualized orbits  Normal visualized paranasal sinuses  CERVICAL VASCULATURE AORTIC ARCH AND GREAT VESSELS:  Normal aortic arch and great vessel origins  Normal visualized subclavian vessels  RIGHT VERTEBRAL ARTERY CERVICAL SEGMENT:  Normal origin  The vessel is normal in caliber throughout the neck  LEFT VERTEBRAL ARTERY CERVICAL SEGMENT:  Normal origin  The vessel is normal in caliber throughout the neck  RIGHT EXTRACRANIAL CAROTID SEGMENT:  No significant stenosis or dissection  Right internal carotid artery is asymmetrically smaller than the left presumably related to intracranial stenosis as described below  LEFT EXTRACRANIAL CAROTID SEGMENT:  Normal caliber common carotid artery  Normal bifurcation and cervical internal carotid artery  No stenosis or dissection  NASCET criteria was used to determine the degree of internal carotid artery diameter stenosis     INTRACRANIAL VASCULATURE INTERNAL CAROTID ARTERIES:  There is mostly noncalcified atherosclerotic plaque with moderate narrowing of the cavernous right ICA and focal severe stenosis in the supraclinoid segment  just proximal to large patent posterior communicating artery  No significant stenosis of the left ICA  ANTERIOR CIRCULATION:  Symmetric A1 segments and anterior cerebral arteries with normal enhancement  Normal anterior communicating artery  MIDDLE CEREBRAL ARTERY CIRCULATION:  M1 segment and middle cerebral artery branches demonstrate normal enhancement bilaterally  DISTAL VERTEBRAL ARTERIES:  Vertebral arteries enhance normally  Distal right vertebral artery is hypoplastic  Cathlean Harman Posterior inferior cerebellar artery origins are normal  Normal vertebral basilar junction  BASILAR ARTERY:  Basilar artery is normal in caliber  Normal superior cerebellar arteries  POSTERIOR CEREBRAL ARTERIES: The right posterior cerebral artery arises from the basilar tip  There is fetal origin of the left posterior cerebral artery  Both demonstrate no focal stenosis  Normal posterior communicating arteries  VENOUS STRUCTURES:  Normal      NON VASCULAR ANATOMY BONY STRUCTURES:  No acute osseous abnormality  Spinal degenerative changes with disc osteophyte complexes causing moderate canal stenosis from C4-5 to C6-7 with mild and moderate foraminal stenosis  SOFT TISSUES OF THE NECK:  Subcentimeter thyroid nodules that do not require further evaluation with ultrasound  THORACIC INLET:  Normal      Impression: 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)  Cervical right ICA is asymmetrically smaller likely due to central stenosis  No other intracranial stenosis, large vessel occlusion or aneurysm  No significant stenosis of the cervical carotid or vertebral arteries  Mild chronic microangiopathy  No acute infarct, hemorrhage or mass effect  Borderline prominent pituitary gland, suggest nonemergent follow-up with MRI brain and sella  The study was marked in [facility] for immediate notification   Workstation performed: [id number]     MRI brain wo contrast Result Date: 12/9/20 22  Narrative: MRI BRAIN WITHOUT CONTRAST INDICATION: TIA  COMPARISON:   CTA head and neck 12/8/2022 TECHNIQUE:  Multiplanar, multisequence imaging of the brain was performed  IMAGE QUALITY:  Diagnostic  FINDINGS: BRAIN PARENCHYMA:  There is no discrete mass, mass effect or midline shift  There is no intracranial hemorrhage  Diffusion imaging is unremarkable  Small chronic lacunar infarct in the right centrum semiovale (series 5 image 18)  Nonspecific  foci of T2/FLAIR hyperintensities involving periventricular and subcortical white matter, most compatible with mild microangiopathic change  VENTRICLES:  Normal for the patient's age  SELLA AND PITUITARY GLAND:  There is a 0 7 cm T1 hyperintense lesion within posterior pituitary gland (series 4 image 12) ORBITS:  Normal  PARANASAL SINUSES:  Normal  VASCULATURE:  Evaluation of the major intracranial vasculature demonstrates appropriate flow voids  CALVARIUM AND SKULL BASE:  Normal  EXTRACRANIAL SOFT TISSUES:  Normal      Impression: 1  No acute ischemia  2   Sequela of old lacunar infarct in the right centrum semiovale  Mild microangiopathic change  3   Incidental 0 7 cm T1 hyperintense lesion within posterior pituitary gland, could be cystic microadenoma versus Rathke's cleft cyst   Recommend correlation with hormonal levels and follow-up with pituitary protocol brain MRI  Workstation performed: [id number]     MRI brain w contrast Result Date: 12/10/2022  Narrative: MRI BRAIN WITH INTRAVENOUS CONTRAST INDICATION: possible seizure  COMPARISON: Precontrast brain MRI from earlier the same date  TECHNIQUE:  Pre and postcontrast multiplanar, multisequence imaging through the sella Multiplanar, multisequence imaging of the brain was performed after gadolinium administration  IV Contrast:  5 mL of Gadobutrol injection (SINGLE-DOSE)  IMAGE QUALITY:   Diagnostic   FINDINGS: SELLA AND PITUITARY GLAND: Stable prominent pituitary gland with convex superior border measuring 9 mm that contacts or nearly contacts the optic chiasm which is not compressed  There is a 0 6 x 1 1 x 0 7 cm (AP, transverse, craniocaudal) T1 hyperintense, T2 hypointense lesion in the posterior pituitary gland  No appreciable enhancement but evaluation is limited due to T1 shortening  Favor Rathke's cleft cyst over adenoma but endocrinologic evaluation and follow-up is recommended  Normal pituitary gland is seen anterior and superior to the lesion  Pituitary stalk is also displaced anteriorly and superiorly  Cavernous sinuses enhance normally  Francis Goody POSTCONTRAST IMAGING:  Postcontrast imaging of the brain demonstrates no abnormal enhancement  ADDITIONAL PERTINENT FINDINGS:  None  Impression: Stable 0 6 x 0 7 x 1 1 cm 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 measuring  without definite enhancement  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 but endocrinological evaluation and follow-up is recommended  No abnormal enhancement of the brain  Workstation performed: [id number]     MRI cervical spine w wo contrast Result Date: 12/10/2022  Narrative: MRI CERVICAL SPINE WITH AND WITHOUT CONTRAST INDICATION: numbness/tingling face and BLE  COMPARISON:  CTA neck dated 12/8/2022  TECHNIQUE:  Multiplanar, multisequence imaging of the cervical spine was performed before and after gadolinium administration     IV Contrast:  5 mL of Gadobutrol injection (SINGLE-DOSE) IMAGE QUALITY:  Diagnostic  FINDINGS: ALIGNMENT:  Loss of the normal cervical lordosis  No subluxation  MARROW SIGNAL:  Normal marrow signal is identified within the visualized bony structures  No discrete marrow lesion  CERVICAL AND VISUALIZED UPPER THORACIC CORD: Tiny focus of myelomalacia suggested at C5-6   PREVERTEBRAL AND PARASPINAL SOFT TISSUES:  Normal  VISUALIZED POSTERIOR FOSSA:  The visualized posterior fossa demonstrates no abnormal signal  CERVICAL DISC SPACES:  Mild developmental canal stenosis with superimposed degenerative spondylosis  C2-C3:  No disc herniation, canal or foraminal stenosis  C3-C4:  Small disc osteophyte complex  Mild canal stenosis  No significant foraminal stenosis  C4-C5: Disc osteophyte complex asymmetric to the right with severe canal stenosis and cord impingement  Moderate right foraminal stenosis  No significant left foraminal stenosis  C5-C6:  Disc osteophyte complex and uncovertebral hypertrophy  Severe canal stenosis with flattening of the cord  C6-C7:  Disc osteophyte complex and uncovertebral hypertrophy  Moderate canal stenosis  Severe right and moderate severe left foraminal stenosis  C7-T1:  No disc herniation, canal or foraminal stenosis  UPPER THORACIC DISC SPACES:  Small disc osteophyte complex at T1-T2 that mildly indents the thecal sac  Multilevel ligamentum flavum hypertrophy and calcification without significant canal stenosis  POSTCONTRAST IMAGING:  Normal  OTHER FINDINGS:  None  Impression: Degenerative spondylosis with disc osteophyte complexes causing severe canal stenosis at C4-5 and C5-6 with mild cord compression  Tiny focus of myelomalacia suggested at C5-6  Moderate canal stenosis at C6-7 with at least moderately severe foraminal stenosis  Workstation performed: [id number]     Echo complete w/ contrast if indicated    Result Date: 12/9/2022  Narrative: •  Left Ventricle: Left ventricular cavity size is normal  Wall thickness is normal  The left ventricular ejection fraction is 60%  Systolic function is normal  Wall motion is normal  Diastolic function is normal  •  Atrial Septum: There is an interatrial septal occluder device noted  No evidence of color flow across the interatrial septum  •  Tricuspid Valve: There is mild regurgitation  I have personally reviewed pertinent reports     and I have personally reviewed pertinent films in PACS

## 2023-01-04 NOTE — ASSESSMENT & PLAN NOTE
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 but endocrinological evaluation and follow-up is recommended  No abnormal enhancement of the brain  · 12/9/2022 MRI brain wo contrast Incidental 0 7 cm T1 hyperintense lesion within posterior pituitary gland, could be cystic microadenoma versus Rathke's cleft cyst   Recommend correlation with hormonal levels and follow-up with pituitary protocol brain MRI  Pituitary stalk is also displaced anteriorly and superiorly  Cavernous sinuses enhance normally  SELLA AND PITUITARY GLAND:  There is a 0 7 cm T1 hyperintense lesion within posterior pituitary gland (series 4 image 12)    PLAN  · Reviewed MRI brain w/wo  · Explained indicated f/u   · Ordered pituitary labs  · Ordered visual fields w/ [place] [facility]   · RTO in 2-3 weeks with completed visual fields and labs   · Will plan follow up pituitary imagining in 6 - 12 months from initial imagining 12/10/22 if pituitary labs normal as per protocol for Rathke Cleft , order at next in 2-3 weeks   · Advised to al the office with additional questions or concerns

## 2023-01-05 ENCOUNTER — CONSULT (OUTPATIENT)
Dept: ENDOCRINOLOGY | Facility: CLINIC | Age: 53
End: 2023-01-05

## 2023-01-05 VITALS
BODY MASS INDEX: 20.2 KG/M2 | RESPIRATION RATE: 18 BRPM | OXYGEN SATURATION: 98 % | SYSTOLIC BLOOD PRESSURE: 112 MMHG | TEMPERATURE: 98.1 F | HEART RATE: 108 BPM | HEIGHT: 63 IN | DIASTOLIC BLOOD PRESSURE: 62 MMHG | WEIGHT: 114 LBS

## 2023-01-05 DIAGNOSIS — M48.02 CERVICAL STENOSIS OF SPINAL CANAL: ICD-10-CM

## 2023-01-05 DIAGNOSIS — D35.2 PITUITARY ADENOMA (HCC): Primary | ICD-10-CM

## 2023-01-05 DIAGNOSIS — E11.42 TYPE 2 DIABETES MELLITUS WITH DIABETIC POLYNEUROPATHY, WITHOUT LONG-TERM CURRENT USE OF INSULIN (HCC): ICD-10-CM

## 2023-01-05 DIAGNOSIS — E11.9 TYPE 2 DIABETES MELLITUS, WITHOUT LONG-TERM CURRENT USE OF INSULIN (HCC): ICD-10-CM

## 2023-01-05 DIAGNOSIS — G95.20 CORD COMPRESSION (HCC): ICD-10-CM

## 2023-01-05 RX ORDER — PEN NEEDLE, DIABETIC 31 GX5/16"
NEEDLE, DISPOSABLE MISCELLANEOUS
COMMUNITY
Start: 2022-12-31

## 2023-01-05 NOTE — ASSESSMENT & PLAN NOTE
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, writes using right hand  · Balance --use ralings when descending steps,Feels balance is more off when descending steps  Denies caudia equnia problems affecting bowel, bladde or saddle anesthesia  Imagining  12/9/2022 MRI cervical spine w/wo    Degenerative spondylosis with disc osteophyte complexes causing severe canal stenosis at C4-5 and C5-6 with mild cord compression  Tiny focus of myelomalacia suggested at C5-6  Moderate canal stenosis at C6-7 with at least moderately severe foraminal stenosis  Reviewed in collaboration with Dr Stu Avendano ---neurosurgical intervention indicated  Plan  Reviewed MRI cervical spine in detail highlighting surgical pathology   DR Stu Avendano met with patient and explained the following   · Surgical Pathology on MRI   · Neurosurgery plan for intervention  Surgical procedure ( 3 level ACDF  Risks, benefits , goal  Overnight hospital stay  With next day admit   Wear [place] cervical collar for about 4 weeks       · Advised to avoid falling , cervical hyperextension, minimize cervical ROM, avoid rear end trauma avoid heavy lifting greater alia 10-20 lbs   · Schedule f/uoffice visit in 6 months sooner if myelopathic symptoms worsen  · Reviewed Red flag symptoms /symptoms of worsening myelopathy   · All with additional questions or concerns  · Need several clearances for comorbid condition  As surgical risk --as addresse din HPI  · If additional questions or concerns call the office

## 2023-01-05 NOTE — ASSESSMENT & PLAN NOTE
Lab Results   Component Value Date    HGBA1C 9 2 (H) 12/08/2022     · Referral to Endocrine  Check out secured an appointment after patient left office , she was aware of plan,   · Appointment for January 5 2022 @ 1400 w/ Dr Rach Jarvis  [address] [facility] , [address]

## 2023-01-05 NOTE — PROGRESS NOTES
Linden Ibarra 47 y o  female MRN: [id number]    Encounter: [id number]    CC:   Pituitary mass     History of Present Illness      HPI:  Max Pimentel is a 47 y o  female who is here for new patient visit for evaluation of a pituitary mass  Patient was referred by neurosurgereon for evaluation for a pituitary mass which was discovered in Brain MRI was done for evaluation for TIA  MRI showed an incidental 0 7 cm hyperintense lesion within posterior pituitary gland with cystic features  repeated MRI with contrast showed a 0 6x0  7x1 1cm lesion in the posterior pituitary gland  Rathke's cyst vs cystic adenoma was differentials  On ROS, the patient reports  Yes  weight loss/ no weight gain  no decreased appetite  yes heat intolerance/ yes cold intolerance, intermittently   yes diarrhea/ no constipation, on metformin   no sweating/no tremor/nopalpitation  no difficulty sleeping  yes hair loss/ no dry skin/ no brittle nails  no weakness/ no fatigue/ no generalized weakness   no proximal muscle weakness  No easy bruising/ no purple striae  Non increased hair growth/no acne  No  HTN/ yes impaired glucose tolerance  no fracture  no nausea/vomiting  no increased thirst/no urination     no headache  no Changes in vision/ Denies loss of peripheral vision  no change in facial appearance/no increase in shoe size/ no increase in hat size/ no increase in ring size/no widening gap between teeth/no oily skin/ no snoring at night  no galactorrhea     No depressed mood/ no irritability  She has type 2 diabetes with strong family history of diabetes, currently on Trulicity 1 56 once weekly, Semglee 18 units at bedtime metformin 1000 mg once a day  She is checking her blood sugar 3 times a day blood sugars are ranging from 150-250  Most recent A1c in December 2022 was 9 2%  She denies symptoms of hyperglycemia  She is  up-to-date for her eye exam   Denies hypoglycemia    Is taking losartan for hypertension, blood pressure is 112/62  She is taking atorvastatin once daily for hyperlipidemia  Review of Systems   Constitutional: Negative for activity change, appetite change, chills, diaphoresis, fatigue, fever and unexpected weight change  HENT: Negative for congestion, drooling, ear discharge, ear pain, trouble swallowing and voice change  Eyes: Negative for photophobia, pain, discharge, redness, itching and visual disturbance  Respiratory: Negative for cough, chest tightness, shortness of breath and wheezing  Cardiovascular: Negative for chest pain, palpitations and leg swelling  Gastrointestinal: Negative for abdominal distention, abdominal pain, blood in stool, diarrhea, nausea and vomiting  Endocrine: Negative for cold intolerance, heat intolerance, polydipsia, polyphagia and polyuria  Genitourinary: Negative for dysuria, flank pain, frequency and hematuria  Musculoskeletal: Negative for back pain, gait problem, joint swelling, myalgias, neck pain and neck stiffness  Skin: Negative for color change, pallor, rash and wound  Neurological: Positive for light-headedness  Negative for dizziness, tremors, seizures, syncope, speech difficulty, weakness, numbness and headaches  Psychiatric/Behavioral: Negative for agitation  All other systems reviewed and are negative        Historical Information   Past Medical History:   Diagnosis Date   • [facility]     breast   • Diabetes mellitus ([facility] )    • Hypertension      Past Surgical History:   Procedure Laterality Date   • BREAST LUMPECTOMY Left    • DILATION AND CURETTAGE OF UTERUS     • LYMPHADENECTOMY       Social History   Social History     Substance and Sexual Activity   Alcohol Use Yes    Comment: socially     Social History     Substance and Sexual Activity   Drug Use No     Social History     Tobacco Use   Smoking Status Never   Smokeless Tobacco Never     Family History:   Family History   Problem Relation Age of Onset   • Diabetes Mother Meds/Allergies   Current Outpatient Medications   Medication Sig Dispense Refill   • acetaminophen (TYLENOL) 325 mg tablet Take 2 tablets (650 mg total) by mouth every 6 (six) hours as needed for mild pain or fever 30 tablet 0   • ascorbic acid (VITAMIN C) 500 MG tablet Take 1 tablet (500 mg total) by mouth daily for 15 days 15 tablet 0   • aspirin 81 mg chewable tablet Chew 1 tablet (81 mg total) daily Do not start before December 11, 2022  30 tablet 0   • atorvastatin (LIPITOR) 20 mg tablet Take 20 mg by mouth daily     • B-D ULTRAFINE III SHORT PEN 31G X 8 MM MISC      • Dulaglutide (Trulicity) 6 80 XC/4 5YA SOPN Inject 1 5 mg under the skin Once a week     • ferrous sulfate 325 (65 Fe) mg tablet Take 325 mg by mouth daily with breakfast     • Insulin Glargine-yfgn 100 UNIT/ML SOPN Inject 10 Units under the skin in the morning Every 3rd day takes 12 units  • losartan (COZAAR) 25 mg tablet Take 25 mg by mouth daily     • metFORMIN (GLUCOPHAGE) 1000 MG tablet Take 1,000 mg by mouth daily with dinner     • vitamin B-12 (CYANOCOBALAMIN) 500 MCG TABS Take 1 tablet (500 mcg total) by mouth daily 30 tablet 1   • dextromethorphan-guaiFENesin (ROBITUSSIN DM)  mg/5 mL syrup Take 10 mL by mouth every 4 (four) hours as needed for cough (Patient not taking: Reported on 1/4/2023) 118 mL 0     No current facility-administered medications for this visit  Allergies   Allergen Reactions   • Dapagliflozin      Facial swelling       Objective   Vitals: There were no vitals taken for this visit      Physical Exam    no frontal bossing, no prominent jaw  Visual fields full to confrontation     no  Widening gap between the teeth, no macroglossia  Neurological - cranial nerves II through XII intact, DTR's normal and symmetric, motor and sensory grossly normal bilaterally, strength 5/5  Musculoskeletal - no joint tenderness, deformity or swelling  Extremities - peripheral pulses normal, no pedal edema, no clubbing or cyanosis  Skin - no increased hair growth,no violaceous striae,no acne, no ecchymoses      The history was obtained from the review of the chart, patient  Lab Results:   Lab Results   Component Value Date/Time    Potassium 4 4 12/10/2022 06:09 AM    Potassium 3 9 12/09/2022 05:14 AM    Potassium 5 1 12/08/2022 02:51 PM    Chloride 105 12/10/2022 06:09 AM    Chloride 102 12/09/2022 05:14 AM    Chloride 101 12/08/2022 02:51 PM    CO2 26 12/10/2022 06:09 AM    CO2 27 12/09/2022 05:14 AM    CO2 27 12/08/2022 02:51 PM    BUN 18 12/10/2022 06:09 AM    BUN 14 12/09/2022 05:14 AM    BUN 11 12/08/2022 02:51 PM    Creatinine 0 55 (L) 12/10/2022 06:09 AM    Creatinine 0 51 (L) 12/09/2022 05:14 AM    Creatinine 0 54 (L) 12/08/2022 02:51 PM    Glucose, Fasting 173 (H) 12/09/2022 05:14 AM    Calcium 9 0 12/10/2022 06:09 AM    Calcium 9 4 12/09/2022 05:14 AM    Calcium 9 9 12/08/2022 02:51 PM    eGFR 108 12/10/2022 06:09 AM    eGFR 111 12/09/2022 05:14 AM    eGFR 109 12/08/2022 02:51 PM    TSH 3RD GENERATON 1 506 12/09/2022 05:14 AM       Imaging Studies:     MRI BRAIN WITH INTRAVENOUS CONTRAST     INDICATION: possible seizure      COMPARISON: Precontrast brain MRI from earlier the same date      TECHNIQUE:    Pre and postcontrast multiplanar, multisequence imaging through the sella  Multiplanar, multisequence imaging of the brain was performed after gadolinium administration         IV Contrast:  5 mL of Gadobutrol injection (SINGLE-DOSE)      IMAGE QUALITY:   Diagnostic      FINDINGS:  SELLA AND PITUITARY GLAND: Stable prominent pituitary gland with convex superior border measuring 9 mm that contacts or nearly contacts the optic chiasm which is not compressed  There is a 0 6 x 1 1 x 0 7 cm (AP, transverse, craniocaudal) T1   hyperintense, T2 hypointense lesion in the posterior pituitary gland  No appreciable enhancement but evaluation is limited due to T1 shortening   [facility] aden[facility] but endocrinologic evaluation and follow-up is recommended  Normal   pituitary gland is seen anterior and superior to the lesion  Pituitary stalk is also displaced anteriorly and superiorly  Cavernous sinuses enhance normally        POSTCONTRAST IMAGING:  Postcontrast imaging of the brain demonstrates no abnormal enhancement      ADDITIONAL PERTINENT FINDINGS:  None      IMPRESSION:     Stable 0 6 x 0 7 x 1 1 cm 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 measuring  without definite enhancement  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 but endocrinological evaluation and follow-up is recommended      No abnormal enhancement of the brain    INDICATION: TIA      COMPARISON:   CTA head and neck 12/8/2022     TECHNIQUE:  Multiplanar, multisequence imaging of the brain was performed         IMAGE QUALITY:  Diagnostic      FINDINGS:     BRAIN PARENCHYMA:  There is no discrete mass, mass effect or midline shift  There is no intracranial hemorrhage  Diffusion imaging is unremarkable      Small chronic lacunar infarct in the right centrum semiovale (series 5 image 18)     Nonspecific  foci of T2/FLAIR hyperintensities involving periventricular and subcortical white matter, most compatible with mild microangiopathic change           VENTRICLES:  Normal for the patient's age      SELLA AND PITUITARY GLAND:  There is a 0 7 cm T1 hyperintense lesion within posterior pituitary gland (series 4 image 12)     ORBITS:  Normal      PARANASAL SINUSES:  Normal      VASCULATURE:  Evaluation of the major intracranial vasculature demonstrates appropriate flow voids      CALVARIUM AND SKULL BASE:  Normal      EXTRACRANIAL SOFT TISSUES:  Normal      IMPRESSION:     1  No acute ischemia      2  Sequela of old lacunar infarct in the right centrum semiovale  Mild microangiopathic change      3    Incidental 0 7 cm T1 hyperintense lesion within posterior pituitary gland, could be cystic microadenoma versus Rathke's cleft cyst  Recommend correlation with hormonal levels and follow-up with pituitary protocol brain MRI  I have personally reviewed pertinent reports  Portions of the record may have been created with voice recognition software  Occasional wrong word or "sound a like" substitutions may have occurred due to the inherent limitations of voice recognition software  Read the chart carefully and recognize, using context, where substitutions have occurred  ASSESSMENT/PLAN:                          Pituitary mass: An incidental 0 6 x 0 7 x 1 1 cm 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, differentials are Rathke's cleft cyst vs cystic adenoma  Patient does not exhibit any stigmata of hormonal overproduction or hormonal deficiencies however we need to  Need to exclude hormone over or under production, if workup normal, then it is nonfunctioning pituitary mass  ACTH, AM cortisol, 24 hours urine free cortisol and creatinine  IGF-1,  TSH, FT4,  Alpha- subunit  Prolactin  Patient denies any symptoms of diabetes insipidus including polyuria polydipsia and patient has no electrolyte abnormalities so the chance of DI is very unlikely, neurosurgery team ordered serum and urine osmolarity which we will follow  If adenoma is nonfunctional as long as obstructive symptoms or vision compromise does not occur, based on its size it can be followed medically  However, masses of certain size, should be removed due to increased risks of pituitary hemorrhage, etc       Reviewed physiology of pituitary gland  Reviewed signs and symptoms of pituitary hormone overproduction, including cushing's, acromegaly, hyperthyroidism, hyperprolactinemia, and underproduction, adrenal insufficiency, hypothyroidism     Reviewed biochemical testing and its interpretation  Reviewed potential need for confirmatory testing  Discussed signs and symptoms of pituitary hemorrhage, if patient develops a sever headache/ visual field changes should go to the ER immediately  opthlmology follow up  Type 2 diabetes with current use of insulin:  Uncontrolled with the most recent A1c of 9 2%  The goal is less than 7%  I made following changes:  I increase Lantus to 22 units at bedtime  I increased Trulicity to 3 mg once weekly  We reviewed side effects of GLP-1 agonist including GI symptoms (nausea, abdominal discomfort), she was instructed to call us if she develops any symptoms  She was instructed to check her blood sugar 2-3 times a day and send us a log and few weeks for further adjustment  Call the office if your blood sugars are consistently below 70 or above 300  She is interested in continuous glucose monitoring, which will be started process of getting approval from her insurance  I have spent 45 minutes with Patient  today in which greater than 50% of this time was spent in counseling/coordination of care regarding Diagnostic results, Prognosis, Risks and benefits of tx options, Intructions for management, Patient and family education, Importance of tx compliance, Risk factor reductions and Impressions

## 2023-01-05 NOTE — ASSESSMENT & PLAN NOTE
· As addressed in MRI cervical spine myelomalacia   · Symptoms of mild myelopathy  --as addressed in HPI  Plan   · Neurosurgery intervention indicated   · RTO appointment scheduled for 6 moth f/u     · RTO sooner if Red flag symptoms --of progressive myelopathy

## 2023-01-07 ENCOUNTER — APPOINTMENT (OUTPATIENT)
Dept: LAB | Facility: CLINIC | Age: 53
End: 2023-01-07

## 2023-01-07 DIAGNOSIS — R90.89 ABNORMAL FINDING ON MRI OF BRAIN: ICD-10-CM

## 2023-01-07 DIAGNOSIS — D35.2 PITUITARY ADENOMA (HCC): ICD-10-CM

## 2023-01-07 LAB
ANION GAP SERPL CALCULATED.3IONS-SCNC: 11 MMOL/L (ref 4–13)
BUN SERPL-MCNC: 16 MG/DL (ref 5–25)
CALCIUM SERPL-MCNC: 9.5 MG/DL (ref 8.3–10.1)
CHLORIDE SERPL-SCNC: 105 MMOL/L (ref 96–108)
CO2 SERPL-SCNC: 23 MMOL/L (ref 21–32)
CREAT 24H UR-MRATE: 1.1 G/24HR (ref 0.6–1.8)
CREAT SERPL-MCNC: 0.71 MG/DL (ref 0.6–1.3)
FSH SERPL-ACNC: 48.1 MIU/ML
GFR SERPL CREATININE-BSD FRML MDRD: 98 ML/MIN/1.73SQ M
GLUCOSE P FAST SERPL-MCNC: 320 MG/DL (ref 65–99)
LH SERPL-ACNC: 26.6 MIU/ML
OSMOLALITY UR/SERPL-RTO: 312 MMOL/KG (ref 282–298)
POTASSIUM SERPL-SCNC: 4.2 MMOL/L (ref 3.5–5.3)
PROLACTIN SERPL-MCNC: 4.4 NG/ML
SODIUM SERPL-SCNC: 139 MMOL/L (ref 135–147)
SPECIMEN VOL UR: 1300 ML
T4 FREE SERPL-MCNC: 0.98 NG/DL (ref 0.76–1.46)
TSH SERPL DL<=0.05 MIU/L-ACNC: 1.07 UIU/ML (ref 0.45–4.5)

## 2023-01-09 ENCOUNTER — APPOINTMENT (OUTPATIENT)
Dept: LAB | Facility: CLINIC | Age: 53
End: 2023-01-09

## 2023-01-09 DIAGNOSIS — R90.89 ABNORMAL FINDING ON MRI OF BRAIN: ICD-10-CM

## 2023-01-09 LAB
CORTIS AM PEAK SERPL-MCNC: 20.3 UG/DL (ref 4.2–22.4)
OSMOLALITY UR: 594 MMOL/KG

## 2023-01-10 LAB
ACTH PLAS-MCNC: 60.4 PG/ML (ref 7.2–63.3)
IGF-I SERPL-MCNC: 292 NG/ML (ref 65–216)
TESTOST FREE SERPL-MCNC: 2.2 PG/ML (ref 0–4.2)
TESTOST SERPL-MCNC: 5 NG/DL (ref 4–50)

## 2023-01-11 LAB
DME PARACHUTE DELIVERY DATE REQUESTED: NORMAL
DME PARACHUTE ITEM DESCRIPTION: NORMAL
DME PARACHUTE ORDER STATUS: NORMAL
DME PARACHUTE SUPPLIER NAME: NORMAL
DME PARACHUTE SUPPLIER PHONE: NORMAL

## 2023-01-14 LAB
CORTIS F 24H UR-MRATE: 23 UG/24 HR (ref 6–42)
CORTIS F UR-MCNC: 18 UG/L

## 2023-01-15 LAB — ALPHA SUBUNIT FREE SERPL-MCNC: 0.71 NG/ML

## 2023-01-16 ENCOUNTER — APPOINTMENT (OUTPATIENT)
Dept: RADIOLOGY | Facility: CLINIC | Age: 53
End: 2023-01-16

## 2023-01-16 DIAGNOSIS — G95.20 CORD COMPRESSION (HCC): ICD-10-CM

## 2023-01-16 DIAGNOSIS — G99.2 STENOSIS OF CERVICAL SPINE WITH MYELOPATHY (HCC): ICD-10-CM

## 2023-01-16 DIAGNOSIS — M48.02 STENOSIS OF CERVICAL SPINE WITH MYELOPATHY (HCC): ICD-10-CM

## 2023-01-18 ENCOUNTER — TELEPHONE (OUTPATIENT)
Dept: NEUROSURGERY | Facility: CLINIC | Age: 53
End: 2023-01-18

## 2023-01-18 NOTE — TELEPHONE ENCOUNTER
1/18/23 Patient needs visual fields completed before her appointment on 1/19/23 was ordered at [facility]  Called her and her  left messages to see if it was completed  Explained we would have to cancel appointment if not  Called [facility] to see if it was completed I also left a message there explaining if it was we need a visual fields faxed over to [address] patient appointment with Rick Mantilla for 01/19/23 with Rick Mantilla  1/19/23 called [facility] to follow up on patients visual eye field  She is a non existing patient there  Called patient gave her number and address to get her test  Done     Patient said she is making an appointment with them today and will call us back to reschedule office visit with Rick Mantilla

## 2023-01-19 ENCOUNTER — TELEPHONE (OUTPATIENT)
Dept: NEUROLOGY | Facility: CLINIC | Age: 53
End: 2023-01-19

## 2023-01-19 NOTE — TELEPHONE ENCOUNTER
Attempted to call pt for reminder of appt with Gian on 01/27/23, no answer  Left a vm of reminder  Patient presents today for a procedure visit, as still has active pains involving trochanteric bursa regions bilaterally and lumbar trigger tendon insertion sites bilaterally.    Consent to treat form signed by the patient.  Patient's bilateral trochanteric bursa regions were prepped in a sterile manner with alcohol swab and ChloraPrep applicator.  Injected Medrol 40 mg 1:1 with lidocaine into bilateral trochanteric bursa regions.  Patient tolerated the procedures well with no complications noted.  Patient was advised to place ice over injection sites if sore over the next 24-48 hours. Kamila LYNCH assisted with procedure      Consent to treat form signed by the patient.  Patient's bilateral lumbar triangle tendon insertion sites were prepped in a sterile manner with an alcohol swab and ChloraPrep applicator. Injected Medrol 40 mg with 2 ml of Lidocaine and 2 ml of Marcaine into bilateral lumbar triangle tendon insertion site regions. Patient tolerated the procedures well with no complications noted.  Patient was advised to place ice over injection sites if sore over the next 24-48 hours. Kamila LYNCH assisted with procedure.

## 2023-01-23 ENCOUNTER — CONSULT (OUTPATIENT)
Dept: VASCULAR SURGERY | Facility: CLINIC | Age: 53
End: 2023-01-23

## 2023-01-23 VITALS
DIASTOLIC BLOOD PRESSURE: 82 MMHG | BODY MASS INDEX: 20.02 KG/M2 | SYSTOLIC BLOOD PRESSURE: 116 MMHG | WEIGHT: 113 LBS | HEIGHT: 63 IN | TEMPERATURE: 98.4 F | HEART RATE: 112 BPM

## 2023-01-23 DIAGNOSIS — I65.21 CAROTID STENOSIS, ASYMPTOMATIC, RIGHT: ICD-10-CM

## 2023-01-23 NOTE — PROGRESS NOTES
Assessment/Plan:    Carotid stenosis, asymptomatic, right  Admitted 12/2022 for paresthesias    CTA showed supraclinoid right ICA and small caliber right cervical ICA without focal stenosis    MR negative for CVA  Neurology has seen her and felt not to be TIA/CVA    Follow up with Vascular PRN  Established with Neurosurgery for spinal stenosis, but has not seen Neuro IR    Will refer         Diagnoses and all orders for this visit:    Carotid stenosis, asymptomatic, right  -     Ambulatory Referral to Vascular Surgery          Subjective:      Patient ID: Clarita Almonte is a 47 y o  female  New patient, presents today to evaluation of carotid artery stenosis  CTA head/neck done 12/8  Patient states that she had 1 episode of expressive aphasia and facial numbness that occurred on 12/8  She was worked up with MR which was negative for CVA  She had CTA neck which showed supra-clinoid right ICA stenosis and small caliber right ICA  HPI    The following portions of the patient's history were reviewed and updated as appropriate: allergies, current medications, past family history, past medical history, past social history, past surgical history and problem list     Review of Systems   Constitutional: Negative  HENT: Negative  Eyes: Negative  Respiratory: Negative  Cardiovascular: Negative  Gastrointestinal: Negative  Endocrine: Negative  Genitourinary: Negative  Musculoskeletal: Negative  Skin: Negative  Allergic/Immunologic: Negative  Neurological: Positive for numbness  Hematological: Negative  Psychiatric/Behavioral: Negative  I have reviewed the ROS above and made changes as needed        Objective:      /82 (BP Location: Left arm, Patient Position: Sitting)   Pulse (!) 112   Temp 98 4 °F (36 9 °C) (Tympanic)   Ht 5' 3" (1 6 m)   Wt 51 3 kg (113 lb)   BMI 20 02 kg/m²          Physical Exam    General  Exam: alert, awake, oriented, no distress, consistent with stated age    [de-identified]  Exam: warm, dry, no gross lesions, no bruises and normal color    Head and Neck  Exam: supple, no bruits, trachea midline, no JVD, no mass or palpable nodes    Eye  Exam: extraoccular movements intact, no scleral icterus, sclera clear, pupils equal round and reactive to light    ENMT  Exam: oral mucosa pink and moist    Chest and Lung  Exam: chest normal without deformity, bilaterally expansive, clear to auscultation    Cardiovascular  Exam: regular rate, regular rhythm, no murmurs, no rubs or gallops    Adbomen  Exam: soft, non-tender, non-distended, no pulsatile abdominal masses, no abdominal bruit    Peripheral Vascular  Exam: no clubbing of the digits of the upper extremity, no cyanosis, no edema, both feet are warm, radial pulses 2+ bilaterally, skin well perfused, without and no varicosities      No widened popliteal pulse noted bilaterally    Upper Extremity:  Palpation: Radial pulse- Bilateral 2+    Lower Extremity:  Palpation: Femoral pulse- Bilateral 2+         Popliteal pulse - Bilateral 2+        Dorsalis Pedis - Bilateral 2+        Posterior tibial - Bilateral 2+    Neurologic  Exam:alert, non-focal, oriented x 3, cranial nerves II-XII grossly intact

## 2023-01-27 ENCOUNTER — OFFICE VISIT (OUTPATIENT)
Dept: NEUROLOGY | Facility: CLINIC | Age: 53
End: 2023-01-27

## 2023-01-27 VITALS
DIASTOLIC BLOOD PRESSURE: 60 MMHG | BODY MASS INDEX: 20.19 KG/M2 | WEIGHT: 114 LBS | TEMPERATURE: 97.6 F | OXYGEN SATURATION: 99 % | RESPIRATION RATE: 16 BRPM | HEART RATE: 100 BPM | SYSTOLIC BLOOD PRESSURE: 110 MMHG

## 2023-01-27 DIAGNOSIS — E11.42 TYPE 2 DIABETES MELLITUS WITH DIABETIC POLYNEUROPATHY, WITHOUT LONG-TERM CURRENT USE OF INSULIN (HCC): ICD-10-CM

## 2023-01-27 DIAGNOSIS — E78.5 HYPERLIPIDEMIA, UNSPECIFIED HYPERLIPIDEMIA TYPE: ICD-10-CM

## 2023-01-27 DIAGNOSIS — Z86.73 HISTORY OF TIA (TRANSIENT ISCHEMIC ATTACK): Primary | ICD-10-CM

## 2023-01-27 DIAGNOSIS — R29.898 LEFT LEG WEAKNESS: ICD-10-CM

## 2023-01-27 DIAGNOSIS — G45.9 TIA (TRANSIENT ISCHEMIC ATTACK): ICD-10-CM

## 2023-01-27 DIAGNOSIS — I10 PRIMARY HYPERTENSION: ICD-10-CM

## 2023-01-27 DIAGNOSIS — G95.20 CORD COMPRESSION (HCC): ICD-10-CM

## 2023-01-27 DIAGNOSIS — I65.21 CAROTID STENOSIS, ASYMPTOMATIC, RIGHT: ICD-10-CM

## 2023-01-27 RX ORDER — LOSARTAN POTASSIUM 50 MG/1
TABLET ORAL
COMMUNITY
Start: 2023-01-23

## 2023-01-27 NOTE — PROGRESS NOTES
Patient ID: Dorothea Bowman is a 47 y o  female who presents to the [facility]  Assessment/Plan:    History of TIA:    I had the pleasure of examining Annita Mitchell today at the [facility] [id number] [facility] office in [place]  Patient is presenting as a hospital follow-up for strokelike symptoms which were suspected to be a TIA at the time  Patient has residual weakness of the bilateral lower extremities, and she has more significant weakness on the left side than the right side  Patient reports some residual numbness of the left hand  Patient still reporting residual facial numbness that is waxing and waning in nature  Due to the patient's significant work-up of severe stenosis of the right ICA along with cervical spinal stenosis leading to cord compression its difficult to isolate whether the patient's facial numbness and other symptoms coincided with a TIA or some of these other conditions  Many of the patient's symptoms are on both sides of the body  Which could suggest some of the numbness came from the cord compression at the cervical spine  Regardless, patient still being treated as a post stroke patient with lifestyle modifications and antiplatelet therapy and statin medication therapy  Along with intracranial right ICA stenosis patient was also found to have had a mass on her pituitary  Patient did report some intermittent episodes of blurry vision which could be entirely due to the mass on her pituitary  Neurosurgery is following both the intracranial right ICA stenosis and the mass on the patient's pituitary  She states that she has a follow-up MRI in about 6 months to reevaluate the pituitary mass  All hormonal lab work done at the moment has come back within normal limits  Patient did report that she had new weakness of the left leg after leaving the hospital   Patient states that she did not have this prior to going to the hospital with her other strokelike symptoms    On exam her left leg was noticeably weaker than the right by a substantial amount  I did order a repeat MRI brain to evaluate for any new strokes that may have happened after her admission to the hospital     Patient Instructions:  -Would like for the patient to complete lipid panel and hemoglobin A1c before next visit  Would like to monitor how the patient's LDL levels are and if there is any need to adjust statin medication  Would like to see how the patient's hemoglobin A1c improves with metformin and insulin  That way her PCP can see if there is any changes she needs towards her insulin therapy   -Would like the patient to complete an MRI brain without contrast before our next visit  She should have this sometime within the next few weeks  She states that since returning from the hospital she has had more significant weakness in her left leg compared to her right  This is more noticeable than her state prior to entering the hospital   Would just like to rule out the possibility of any new strokes at this time   -Continue to follow with neurosurgery in regards to cervical stenosis with cord compression as well as intracranial right ICA stenosis  -Continue to monitor blood pressure at home and monitor blood sugar at home if possible   -Educated the patient on healthy diet recommendations and the importance of physical exercise  - For ongoing stroke prevention continue: Aspirin 81 mg once daily, atorvastatin 20 mg once daily  -Continue to follow with primary care provider regarding blood pressure medication recommendations and diabetes medication recommendations  - Discussed the importance of antiplatelet management with the patient to prevent future strokes  - Recommend to check blood pressure occasionally away from the doctor's office to make sure that those numbers are typically less than 130/80    If they are frequently higher than that, we recommend checking a little more often and to follow up with primary care team   - Will defer to primary care team for monitoring of cholesterol panel and blood sugar numbers with target LDL cholesterol of less than 70 and hemoglobin A1c less than 7%  - Recommend following a low salt, mediterranean diet   - Recommend routine physical exercise as tolerated     We will plan for her to return to the office in 6 months time to see on of the APPs or Dr Jose David Zavala but would be happy to see her sooner if the need should arise  If she has any symptoms concerning for TIA or stroke including sudden painless loss of vision or double vision, difficulty speaking or swallowing, vertigo/room spinning that does not quickly resolve, or weakness/numbness/loss of coordination affecting 1 side of the face or body she should proceed by ambulance to the nearest emergency room immediately  Subjective:    HPI    For Review:  Leti Flores is a 47 y o  female who presents for follow up in regard to her recent stroke  Patient has significant past medical history of hypertension, diabetes, breast cancer status postlumpectomy in 2013  Patient was sent to the emergency department on 12/8/2022 by her PCP for further evaluation of transient episode of confusion and numbness  Patient was in her usual state of health the morning prior  Went to work around 7:30 AM around 830 attempts to make a list of coworkers names but could not think of their names for a few minutes  Shortly after she checked her blood glucose which was reported to be 172  She went home and reported intermittent numbness of her entire face numbness/tingling of both legs  Left leg numbness greater than the right leg  This was around 10 AM which lasted a few seconds to few minutes  The PCP had sent her to the ED following this event  Neurological exam was intact except for a slight facial droop which was reported to be chronic  Patient denied any issues with urinary/bowel movement incontinence       CTA head and neck noted severe stenosis of the intracranial right ICA  MRI brain without contrast showed no signs of acute infarct  Old lacunar infarct in the right centrum semiovale  Patient was also found to have a pituitary lesion on her MRI  Patient was ordered an MRI C-spine which demonstrated severe canal stenosis at C4/5, C5/6 with mild cord compression  Patient was required to have an immediate neurosurgery evaluation at this point  Patient had an outpatient appointment with neurosurgery on 1/04/2023  An MRI brain with contrast on 12/10/2022 was able to : "Stable 0 6 x 0 7 x 1 1 cm 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 measuring  without definite enhancement  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 but endocrinological evaluation and follow-up is recommended  No abnormal enhancement of the brain "    Neurosurgery continuing to monitor this for the time being  Patient was ordered pituitary labs and recommended for visual field evaluation by [facility]  Neurosurgery also addressed the patient's MRI cervical spine with cord compression mild mild malacia  Patient has mild symptoms of myelopathy  Intervention is indicated by neurosurgery however the patient will have repeat scans in a few months and follow-up regarding at the patient may need surgery at some point  Residual symptoms include: facial assymetry, weakness of the bilateral UE/LE: lower extremities and fatigue    Current treatment including stroke ppx:  Aspirin 81mg and Appropriately dosed statin yes    Stroke risk factors were evaluated including: Hypertension, hyperlipidemia, type 2 diabetes mellitus  Interval History:    Patient was having weakness of both legs, however the left side was much worse  Some weakness in her left hand  Numbness on her left index finger  Patient has more numbness on the left side versus the right  Feels numbness on her whole face and whole head    Does feel significant numbness still from time to time  It appears to be waxing and waning  Patient reports some blurry vision, patient does have a reported pituitary mass that may be affecting her vision  Does not need to be removed at this time  No new progressive weakness numbness  No new stroke-like vision loss, dizziness, loss of coordination, no difficulty with speech  Patient reports headaches when she has issues with her blood sugar  Patient is taking atorvasatin and aspirin  She is also taking blood pressure medication  Patient is still taking metformin and insulin  Patient has a way to monitor her blood pressure  Patient is able to monitor her sugars at home  Patient's most recent LDL is 78 mg/dL  Patient's most recent hemoglobin A1C is 9 2%  Patient states that hs eats healthy  Does low calories, low carbohydrate diet, trying to focus on diet more  Patient knows what food she needs to be eating, however sometimes its hard to follow through with it  Patient is trying to walk more, does 10k a day at work challenge  Patient tried to go to sleep at 8:30 pm but will lay down and then stay on her phone for another hour or two  Patient sleeps 6 to 7 hours a night, sometimes taking melatonin  No trauma to the head or neck area  Patient was told she may have spinal stenosis and cord compression potentially from some genetic effect  Has lower back pain  No red flag symptoms of urinary incontinence or bowel incontinence  Has thrown out her lower back a few times         Lab Results   Component Value Date/Time    CHOLESTEROL 180 12/08/2022 02:51 PM     Lab Results   Component Value Date/Time    TRIG 143 12/08/2022 02:51 PM     Lab Results   Component Value Date/Time    HDL 73 12/08/2022 02:51 PM     Lab Results   Component Value Date/Time    LDLCALC 78 12/08/2022 02:51 PM       Lab Results   Component Value Date/Time    HGBA1C 9 2 (H) 12/08/2022 02:51 PM    HGBA1C 9 1 (H) 11/01/2022 07:54 AM     Lab Results Component Value Date/Time     12/08/2022 02:51 PM     (H) 11/01/2022 07:54 AM     (H) 06/07/2018 08:24 AM           Past Medical History:   Diagnosis Date   • Cancer ([facility] )     breast   • Diabetes mellitus ([facility] )    • Hypertension        Current Outpatient Medications:   •  acetaminophen (TYLENOL) 325 mg tablet, Take 2 tablets (650 mg total) by mouth every 6 (six) hours as needed for mild pain or fever, Disp: 30 tablet, Rfl: 0  •  ascorbic acid (VITAMIN C) 500 MG tablet, Take 1 tablet (500 mg total) by mouth daily for 15 days, Disp: 15 tablet, Rfl: 0  •  aspirin 81 mg chewable tablet, Chew 1 tablet (81 mg total) daily Do not start before December 11, 2022 , Disp: 30 tablet, Rfl: 0  •  atorvastatin (LIPITOR) 20 mg tablet, Take 20 mg by mouth daily, Disp: , Rfl:   •  B-D ULTRAFINE III SHORT PEN 31G X 8 MM MISC, , Disp: , Rfl:   •  dextromethorphan-guaiFENesin (ROBITUSSIN DM)  mg/5 mL syrup, Take 10 mL by mouth every 4 (four) hours as needed for cough, Disp: 118 mL, Rfl: 0  •  Dulaglutide (Trulicity) 6 37 HP/4 8WO SOPN, Inject 3 mg under the skin Once a week, Disp: , Rfl:   •  ferrous sulfate 325 (65 Fe) mg tablet, Take 325 mg by mouth daily with breakfast, Disp: , Rfl:   •  Insulin Glargine-yfgn 100 UNIT/ML SOPN, Inject 22 Units under the skin in the morning Every 3rd day takes 12 units  , Disp: , Rfl:   •  losartan (COZAAR) 25 mg tablet, Take 25 mg by mouth daily, Disp: , Rfl:   •  metFORMIN (GLUCOPHAGE) 1000 MG tablet, Take 1,000 mg by mouth daily with dinner, Disp: , Rfl:   •  vitamin B-12 (CYANOCOBALAMIN) 500 MCG TABS, Take 1 tablet (500 mcg total) by mouth daily, Disp: 30 tablet, Rfl: 1     Objective:    Physical Exam:                                                                 Vitals:            Constitutional:    /60 (BP Location: Right arm, Patient Position: Sitting, Cuff Size: Standard)   Pulse 100   Temp 97 6 °F (36 4 °C) (Temporal)   Resp 16   Wt 51 7 kg (114 lb) SpO2 99%   BMI 20 19 kg/m²   BP Readings from Last 3 Encounters:   01/27/23 110/60   01/23/23 116/82   01/05/23 112/62     Pulse Readings from Last 3 Encounters:   01/27/23 100   01/23/23 (!) 112   01/05/23 (!) 108         Well developed, well nourished, well groomed  No dysmorphic features  Psychiatric:  Normal behavior and appropriate affect       Neurological Examination:     Mental status/cognitive function:   Orientated to time, place and person  Recent and remote memory intact  Attention span and concentration as well as fund of knowledge are appropriate for age  Normal language and spontaneous speech  Cranial Nerves:  II-visual fields full  Peripheral vision intact despite episodes of blurry vision  III, IV, VI-Pupils were equal, round, and reactive to light and accomodation  Extraocular movements were full and conjugate without nystagmus  Conjugate gaze, normal smooth pursuits, normal saccades   V-facial sensation symmetric  VII-facial expression asymmetric (slight left facial droop), intact forehead wrinkle, strong eye closure, symmetric smile    VIII-hearing grossly intact bilaterally   IX, X-palate elevation symmetric, no dysarthria  XI-shoulder shrug strength intact    XII-tongue protrusion midline  Motor Exam: symmetric bulk and tone throughout, no pronator drift  Power/strength 5/5 bilateral upper extremities, power/strength 5/5 of the lower right extremity, power/strength 4/5 of the lower left extremity  No atrophy, fasciculations or abnormal movements noted  Sensory: Lightly decreased light tense sensation on the lower left extremity  Reflexes: brachioradialis 2+, biceps 2+, knee 2+ bilaterally  Coordination: Finger nose finger intact bilaterally, no apparent dysmetria, ataxia or tremor noted  Gait: steady casual and tandem gait  ROS:  Review of Systems   Constitutional: Negative  Negative for appetite change and fever  HENT: Negative    Negative for hearing loss, tinnitus, trouble swallowing and voice change  Eyes: Negative  Negative for photophobia, pain and visual disturbance  Respiratory: Negative  Negative for shortness of breath  Cardiovascular: Negative  Negative for palpitations  Gastrointestinal: Negative  Negative for nausea and vomiting  Endocrine: Negative  Negative for cold intolerance  Genitourinary: Negative  Negative for dysuria, frequency and urgency  Musculoskeletal: Negative  Negative for gait problem, myalgias and neck pain  Skin: Negative  Negative for rash  Allergic/Immunologic: Negative  Neurological: Positive for numbness  Negative for dizziness, tremors, seizures, syncope, facial asymmetry, speech difficulty, weakness, light-headedness and headaches  Hematological: Negative  Does not bruise/bleed easily  Psychiatric/Behavioral: Negative  Negative for confusion, hallucinations and sleep disturbance         I have spent 60 minutes today on this case including chart review, performing history and exam, patient counseling, and documentation/communication    Emanuel Mckenzie PA-C  01/27/2023

## 2023-01-27 NOTE — PROGRESS NOTES
Review of Systems   Constitutional: Negative  Negative for appetite change and fever  HENT: Negative  Negative for hearing loss, tinnitus, trouble swallowing and voice change  Eyes: Negative  Negative for photophobia, pain and visual disturbance  Respiratory: Negative  Negative for shortness of breath  Cardiovascular: Negative  Negative for palpitations  Gastrointestinal: Negative  Negative for nausea and vomiting  Endocrine: Negative  Negative for cold intolerance  Genitourinary: Negative  Negative for dysuria, frequency and urgency  Musculoskeletal: Negative  Negative for gait problem, myalgias and neck pain  Skin: Negative  Negative for rash  Allergic/Immunologic: Negative  Neurological: Positive for numbness  Negative for dizziness, tremors, seizures, syncope, facial asymmetry, speech difficulty, weakness, light-headedness and headaches  Hematological: Negative  Does not bruise/bleed easily  Psychiatric/Behavioral: Negative  Negative for confusion, hallucinations and sleep disturbance

## 2023-01-27 NOTE — PATIENT INSTRUCTIONS
History of TIA:  -Would like for the patient to complete lipid panel and hemoglobin A1c before next visit  Would like to monitor how the patient's LDL levels are and if there is any need to adjust statin medication  Would like to see how the patient's hemoglobin A1c improves with metformin and insulin  That way her PCP can see if there is any changes she needs towards her insulin therapy   -Would like the patient to complete an MRI brain without contrast before our next visit  She should have this sometime within the next few weeks  She states that since returning from the hospital she has had more significant weakness in her left leg compared to her right  This is more noticeable than her state prior to entering the hospital   Would just like to rule out the possibility of any new strokes at this time   -Continue to follow with neurosurgery in regards to cervical stenosis with cord compression as well as intracranial right ICA stenosis  -Continue to monitor blood pressure at home and monitor blood sugar at home if possible   -Educated the patient on healthy diet recommendations and the importance of physical exercise  - For ongoing stroke prevention continue: Aspirin 81 mg once daily, atorvastatin 20 mg once daily  -Continue to follow with primary care provider regarding blood pressure medication recommendations and diabetes medication recommendations  - Discussed the importance of antiplatelet management with the patient to prevent future strokes  - Recommend to check blood pressure occasionally away from the doctor's office to make sure that those numbers are typically less than 130/80    If they are frequently higher than that, we recommend checking a little more often and to follow up with primary care team   - Will defer to primary care team for monitoring of cholesterol panel and blood sugar numbers with target LDL cholesterol of less than 70 and hemoglobin A1c less than 7%  - Recommend following a low salt, mediterranean diet   - Recommend routine physical exercise as tolerated     We will plan for her to return to the office in 6 months time to see on of the APPs or Dr Chucky Shaw but would be happy to see her sooner if the need should arise  If she has any symptoms concerning for TIA or stroke including sudden painless loss of vision or double vision, difficulty speaking or swallowing, vertigo/room spinning that does not quickly resolve, or weakness/numbness/loss of coordination affecting 1 side of the face or body she should proceed by ambulance to the nearest emergency room immediately

## 2023-02-01 ENCOUNTER — TELEPHONE (OUTPATIENT)
Dept: NEUROLOGY | Facility: CLINIC | Age: 53
End: 2023-02-01

## 2023-02-01 RX ORDER — ATORVASTATIN CALCIUM 40 MG/1
40 TABLET, FILM COATED ORAL DAILY
Qty: 30 TABLET | Refills: 3 | Status: SHIPPED | OUTPATIENT
Start: 2023-02-01

## 2023-02-01 NOTE — TELEPHONE ENCOUNTER
Cynthia Corral understand that the patient does have some life things that are obviously a little bit more important than getting an MRI for the time being   I just hope that she has the understanding that we are looking for a new stroke at this time given her new deficits   As long as she understands this and is compliant with waiting that is fine with me  Clary Peraza, the patient has had this weakness for a few weeks now so is not like we are looking for any sort of super acute deficit that happened just maybe a day or 2 prior to her visit   If that is the day she schedules and that is the only day she can go and then that is okay I would just like her to be aware that if she did indeed have a history another stroke we will be finding out about until the end of February   Thank you, appreciate all your work!     -Mariano Fournier PA-C

## 2023-02-01 NOTE — TELEPHONE ENCOUNTER
Called and spoke with patient  Informed patient of Medication increase and patient is agreeable to increase in medication and would like medication sent to [facility] on [address] in [facility]  Patient states that when MRI was scheduled that appointment that is scheduled is the first available that was able to be scheduled

## 2023-02-01 NOTE — TELEPHONE ENCOUNTER
Called and spoke with patient  Patient understands that she will not find out results until the end of February and she states that she will be very careful until she finds out  Patient states she just doesn't have time to go and get the MRI done before than

## 2023-02-01 NOTE — TELEPHONE ENCOUNTER
Stas Kauffman PA-C  [facility] [place] [id number],     Per Dr Pro Baca, would like to see if we can bump patient's lipitor up to 40 mg one daily if this okay with her   Would like to add some extra protection of the patient's plaque in her right internal carotid artery and believe that the increased dose in the Lipitor will help stabilize that plaque   If the patient is compliant to this let me know and I will put in the orders for a new prescription of Lipitor   Also, patient does have an MRI scheduled at the end of February I believe   If there were some way we could see if this MRI could be done a little bit more sooner than the end of February that I would appreciate that as well   Thank you!     -Ira Shrestha PA-C

## 2023-02-01 NOTE — TELEPHONE ENCOUNTER
Per Joanne Mcdowell, patient is to have MRI completed sooner than 2/20/23  Called and spoke with patient to inform that PA would like to have MRI completed sooner  Asked patient if she would like for me to assist her in scheduling the MRI sooner  Patient states that she is unable to have MRI completed sooner due to not having time off work and having an ill  at home that she takes care of after work  Patient states that 2/20/23 is the only day that she has off form work that she would be able to have MRI completed  Patient does not wish to have MRI completed sooner  Informed patient that I would let Glo Soulier know

## 2023-02-20 ENCOUNTER — HOSPITAL ENCOUNTER (OUTPATIENT)
Dept: MRI IMAGING | Facility: HOSPITAL | Age: 53
Discharge: HOME | End: 2023-02-20

## 2023-02-20 ENCOUNTER — HOSPITAL ENCOUNTER (OUTPATIENT)
Dept: NEUROLOGY | Facility: HOSPITAL | Age: 53
Discharge: HOME | End: 2023-02-20

## 2023-02-20 DIAGNOSIS — R40.4 TRANSIENT ALTERATION OF AWARENESS: ICD-10-CM

## 2023-02-20 DIAGNOSIS — R29.898 LEFT LEG WEAKNESS: ICD-10-CM

## 2023-02-20 DIAGNOSIS — Z86.73 HISTORY OF TIA (TRANSIENT ISCHEMIC ATTACK): ICD-10-CM

## 2023-03-14 ENCOUNTER — TELEPHONE (OUTPATIENT)
Dept: NEUROLOGY | Facility: CLINIC | Age: 53
End: 2023-03-14

## 2023-03-14 NOTE — TELEPHONE ENCOUNTER
Patient canceled appointment with Saumya Damon on 7/27/23 because she is seeing Neurosurgery on 7/19/23  She will call back to reschedule

## 2023-04-05 ENCOUNTER — OFFICE VISIT (OUTPATIENT)
Dept: ENDOCRINOLOGY | Facility: CLINIC | Age: 53
End: 2023-04-05

## 2023-04-05 VITALS
BODY MASS INDEX: 20.66 KG/M2 | HEIGHT: 63 IN | WEIGHT: 116.6 LBS | DIASTOLIC BLOOD PRESSURE: 62 MMHG | TEMPERATURE: 97.6 F | HEART RATE: 91 BPM | OXYGEN SATURATION: 98 % | SYSTOLIC BLOOD PRESSURE: 110 MMHG

## 2023-04-05 DIAGNOSIS — E11.00 TYPE 2 DIABETES MELLITUS WITH HYPEROSMOLARITY WITHOUT COMA, WITH LONG-TERM CURRENT USE OF INSULIN (HCC): ICD-10-CM

## 2023-04-05 DIAGNOSIS — D35.2 PITUITARY MACROADENOMA (HCC): ICD-10-CM

## 2023-04-05 DIAGNOSIS — Z79.4 TYPE 2 DIABETES MELLITUS WITH HYPEROSMOLARITY WITHOUT COMA, WITH LONG-TERM CURRENT USE OF INSULIN (HCC): Primary | ICD-10-CM

## 2023-04-05 DIAGNOSIS — I10 PRIMARY HYPERTENSION: ICD-10-CM

## 2023-04-05 DIAGNOSIS — E11.00 TYPE 2 DIABETES MELLITUS WITH HYPEROSMOLARITY WITHOUT COMA, WITH LONG-TERM CURRENT USE OF INSULIN (HCC): Primary | ICD-10-CM

## 2023-04-05 DIAGNOSIS — E78.2 MIXED HYPERLIPIDEMIA: ICD-10-CM

## 2023-04-05 DIAGNOSIS — Z79.4 TYPE 2 DIABETES MELLITUS WITH HYPEROSMOLARITY WITHOUT COMA, WITH LONG-TERM CURRENT USE OF INSULIN (HCC): ICD-10-CM

## 2023-04-05 LAB — SL AMB POCT HEMOGLOBIN AIC: 7.1 (ref ?–6.5)

## 2023-04-05 RX ORDER — DULAGLUTIDE 3 MG/.5ML
3 INJECTION, SOLUTION SUBCUTANEOUS
Qty: 2 ML | Refills: 2 | Status: SHIPPED | OUTPATIENT
Start: 2023-04-05 | End: 2023-04-05

## 2023-04-05 RX ORDER — DULAGLUTIDE 3 MG/.5ML
3 INJECTION, SOLUTION SUBCUTANEOUS
Qty: 2 ML | Refills: 2 | Status: SHIPPED | OUTPATIENT
Start: 2023-04-05 | End: 2023-07-04

## 2023-04-05 RX ORDER — DULAGLUTIDE 1.5 MG/.5ML
INJECTION, SOLUTION SUBCUTANEOUS
COMMUNITY
Start: 2023-02-03 | End: 2023-04-05

## 2023-04-05 NOTE — PROGRESS NOTES
Established patient Progress Note      Cc: diabetes    Referring Provider  No referring provider defined for this encounter  History of Present Illness:   Edis Verma is a 47 y o  female with a history of type 2 diabetes without long term use of insulin, pituitary microadenoma who presented for follow-up  Patient had a brain MRI for evaluation for TIA which showed an incidental 0 6x0  7x1 1cm lesion in the posterior pituitary gland  Rathke's cyst vs cystic adenoma was differentials  Patient is following with neurosurgery which are planning to repeat MRI  Patient has seen ophthalmology and visual field eye exam was done  Patient denies headaches, weight loss or weight gain, decreased appetite, heat intolerance, cold intolerance, hair loss, dry skin, brittle nails, weakness, fatigue, generalized weakness, proximal muscle weakness, easy bruising, purple colored striae, increased hair growth, acne, hypertension, fracture, nausea or vomiting, increased thirst or urination,      For diabetes patient is on Lantus 22 units nightly, Trulicity 3 mg once weekly and metformin 1000 mg twice a day, glycemic control improved significantly and A1c is 7 1%          Patient Active Problem List   Diagnosis   • SIRS (systemic inflammatory response syndrome) ([facility])   • Hypertension   • Hyperlipidemia   • Viral respiratory infection   • Type 2 diabetes mellitus, with long-term current use of insulin ([facility])   • COVID-19 virus detected   • TIA (transient ischemic attack)   • Transient alteration of awareness   • Paresthesias   • Carotid stenosis, asymptomatic, right   • Abnormal MRI, cervical spine   • ASD (atrial septal defect)   • History of percutaneous transcatheter closure of congenital ASD   • Cord compression (HCC)   • Abnormal finding on MRI of brain   • Stenosis of cervical spine with myelopathy ([facility])   • Type 2 diabetes mellitus with diabetic polyneuropathy, without long-term current use of insulin ([facility] )   • History of TIA (transient ischemic attack)      Past Medical History:   Diagnosis Date   • Cancer ([facility] )     breast   • Diabetes mellitus ([facility] )    • Hypertension       Past Surgical History:   Procedure Laterality Date   • BREAST LUMPECTOMY Left    • DILATION AND CURETTAGE OF UTERUS     • LYMPHADENECTOMY        Family History   Problem Relation Age of Onset   • Diabetes Mother      Social History     Tobacco Use   • Smoking status: Never   • Smokeless tobacco: Never   Substance Use Topics   • Alcohol use: Yes     Comment: socially     Allergies   Allergen Reactions   • Dapagliflozin      Facial swelling         Current Outpatient Medications:   •  atorvastatin (LIPITOR) 40 mg tablet, Take 1 tablet (40 mg total) by mouth daily, Disp: 30 tablet, Rfl: 3  •  B-D ULTRAFINE III SHORT PEN 31G X 8 MM MISC, , Disp: , Rfl:   •  Dulaglutide (Trulicity) 4 54 PT/2 6KW SOPN, Inject 3 mg under the skin Once a week, Disp: , Rfl:   •  ferrous sulfate 325 (65 Fe) mg tablet, Take 325 mg by mouth daily with breakfast, Disp: , Rfl:   •  Insulin Glargine-yfgn 100 UNIT/ML SOPN, Inject 22 Units under the skin in the morning Every 3rd day takes 12 units  , Disp: , Rfl:   •  losartan (COZAAR) 50 mg tablet, , Disp: , Rfl:   •  metFORMIN (GLUCOPHAGE) 1000 MG tablet, Take 1,000 mg by mouth daily with dinner, Disp: , Rfl:   •  Trulicity 1 5 QO/4 4LB injection, , Disp: , Rfl:   •  acetaminophen (TYLENOL) 325 mg tablet, Take 2 tablets (650 mg total) by mouth every 6 (six) hours as needed for mild pain or fever (Patient not taking: Reported on 1/27/2023), Disp: 30 tablet, Rfl: 0  •  aspirin 81 mg chewable tablet, Chew 1 tablet (81 mg total) daily Do not start before December 11, 2022 , Disp: 30 tablet, Rfl: 0  •  vitamin B-12 (CYANOCOBALAMIN) 500 MCG TABS, Take 1 tablet (500 mcg total) by mouth daily, Disp: 30 tablet, Rfl: 1  Review of Systems   Constitutional: Negative for activity change, appetite change, chills, diaphoresis, fatigue, fever and "unexpected weight change  HENT: Negative for congestion, drooling, ear discharge, ear pain, trouble swallowing and voice change  Eyes: Negative for photophobia, pain, discharge, redness, itching and visual disturbance  Respiratory: Negative for cough, chest tightness, shortness of breath and wheezing  Cardiovascular: Negative for chest pain, palpitations and leg swelling  Gastrointestinal: Negative for abdominal distention, abdominal pain, blood in stool, diarrhea, nausea and vomiting  Endocrine: Negative for cold intolerance, heat intolerance, polydipsia, polyphagia and polyuria  Genitourinary: Negative for dysuria, flank pain, frequency and hematuria  Musculoskeletal: Negative for back pain, gait problem, joint swelling, myalgias, neck pain and neck stiffness  Skin: Negative for color change, pallor, rash and wound  Neurological: Negative for dizziness, tremors, seizures, syncope, speech difficulty, weakness, numbness and headaches  Psychiatric/Behavioral: Negative for agitation  All other systems reviewed and are negative  Physical Exam:  Body mass index is 20 65 kg/m²    /62 (BP Location: Left arm, Patient Position: Sitting, Cuff Size: Standard)   Pulse 91   Temp 97 6 °F (36 4 °C) (Temporal)   Ht 5' 3\" (1 6 m)   Wt 52 9 kg (116 lb 9 6 oz)   SpO2 98%   BMI 20 65 kg/m²    Wt Readings from Last 3 Encounters:   04/05/23 52 9 kg (116 lb 9 6 oz)   01/27/23 51 7 kg (114 lb)   01/23/23 51 3 kg (113 lb)       GEN: NAD  E/n/m nl facies,   Eyes: no stare or proptosis,   Neck: trachea midline, thyroid NT to palpation, nl in size, no nodules or neck masses noted, no cervical LAD  CV; heart reg rate s1s2 nl, no m/r/g appreciated, no GAIL  Resp: CTAB, good effort  Ab+BS  Neuro: no tremor, 2+ DTRs in BUE  MS: no c/c in digits, moves all 4 ext, nl muscle bulk, gait nl  Skin: warm and dry, no palmar erythema  Ext: no c/c in digits, no edema bilaterally, 2+ DP and PT pulses bilat,   Psych: " nl mood and affect, no gross lapses in memory      Labs:   No components found for: HA1C  No components found for: GLU    Lab Results   Component Value Date    CREATININE 0 71 01/07/2023    CREATININE 0 55 (L) 12/10/2022    CREATININE 0 51 (L) 12/09/2022    BUN 16 01/07/2023    K 4 2 01/07/2023     01/07/2023    CO2 23 01/07/2023     eGFR   Date Value Ref Range Status   01/07/2023 98 ml/min/1 73sq m Final     No components found for: [facility]    Lab Results   Component Value Date    HDL 73 12/08/2022    TRIG 143 12/08/2022       Lab Results   Component Value Date    ALT 26 12/09/2022    AST 18 12/09/2022    ALKPHOS 63 12/09/2022       Lab Results   Component Value Date    FREET4 0 98 01/07/2023       Component      Latest Ref Rng & Units 1/7/2023 1/9/2023 4/5/2023   Sodium      135 - 147 mmol/L 139     Potassium      3 5 - 5 3 mmol/L 4 2     Chloride      96 - 108 mmol/L 105     CO2      21 - 32 mmol/L 23     Anion Gap      4 - 13 mmol/L 11     BUN      5 - 25 mg/dL 16     Creatinine      0 60 - 1 30 mg/dL 0 71     GLUCOSE FASTING      65 - 99 mg/dL 320 (H)     Calcium      8 3 - 10 1 mg/dL 9 5     eGFR      ml/min/1 73sq m 98     CORTISOL,F,UG/L,U      Undefined ug/L 18     CORTISOL 24H URINARY FREE      6 - 42 ug/24 hr 23     CREATININE 24 HOUR UR      0 6 - 1 8 g/24Hr 1 1     TOTAL URINE VOLUME      ml 1,300     TESTOSTERONE FREE      0 0 - 4 2 pg/mL  2 2    Testosterone, Total, LC/MS      4 - 50 ng/dL  5    TSH 3RD GENERATON      0 450 - 4 500 uIU/mL 1 070     Free T4      0 76 - 1 46 ng/dL 0 98     ACTH      7 2 - 63 3 pg/mL  60 4    Cortisol - AM      4 2 - 22 4 ug/dL  20 3    ALPHA SUBUNIT (FREE)      ng/mL 0 71     PROLACTIN      ng/mL 4 4     FSH, POC      mIU/mL 48 1     LUTEINIZING HORMONE      mIU/mL 26 6     Osmolality, Ur      250 - 900 mmol/KG  594    OSMOLALITY, SERUM      282 - 298 mmol/ (H)     Hemoglobin A1C      6 5   7 1 (A)     MRI BRAIN WITH INTRAVENOUS CONTRAST     INDICATION: possible seizure      COMPARISON: Precontrast brain MRI from earlier the same date      TECHNIQUE:    Pre and postcontrast multiplanar, multisequence imaging through the sella  Multiplanar, multisequence imaging of the brain was performed after gadolinium administration         IV Contrast:  5 mL of Gadobutrol injection (SINGLE-DOSE)      IMAGE QUALITY:   Diagnostic      FINDINGS:  SELLA AND PITUITARY GLAND: Stable prominent pituitary gland with convex superior border measuring 9 mm that contacts or nearly contacts the optic chiasm which is not compressed  There is a 0 6 x 1 1 x 0 7 cm (AP, transverse, craniocaudal) T1   hyperintense, T2 hypointense lesion in the posterior pituitary gland  No appreciable enhancement but evaluation is limited due to T1 shortening  Favor Rathke's cleft cyst over adenoma but endocrinologic evaluation and follow-up is recommended  Normal   pituitary gland is seen anterior and superior to the lesion  Pituitary stalk is also displaced anteriorly and superiorly  Cavernous sinuses enhance normally        POSTCONTRAST IMAGING:  Postcontrast imaging of the brain demonstrates no abnormal enhancement      ADDITIONAL PERTINENT FINDINGS:  None      IMPRESSION:     Stable 0 6 x 0 7 x 1 1 cm 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 measuring  without definite enhancement  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 but endocrinological evaluation and follow-up is recommended      No abnormal enhancement of the brain    Impression:  1  Type 2 diabetes mellitus with hyperosmolarity without coma, with long-term current use of insulin ([address] )    2  Primary hypertension    3  Mixed hyperlipidemia    4  Pituitary macroadenoma ([address] )           Plan:    Alma Ring was seen today for follow-up      Diagnoses and all orders for this visit:    Type 2 diabetes mellitus with hyperosmolarity without coma, with long-term current use of insulin ([address] ):  Glycemic control is improving significantly, A1c of 7 1%, the goal is less than 7%,  Due to occasional morning low blood sugars I decreased Lantus to 20 units nightly, would continue Trulicity 3 mg once weekly on metformin 1000 mg twice daily  She was advised to check her blood sugar 3-4 times a day and send me a log to review and make necessary changes  We reviewed hypoglycemia symptoms and rule of 15's in treatment of hypoglycemia  She was advised to call office if blood sugars are consistently above 300 or below 70  Return back in 3 months, Labs prior to next visit    -     POCT hemoglobin A1c  -     Discontinue: dulaglutide (Trulicity) 3 HI/9 7HZ injection; Inject 0 5 mL (3 mg total) under the skin every 7 days She already has supply for next 3 moths this should be refilled after 3 months  -     Comprehensive metabolic panel; Future    Primary hypertension:  Controlled, continue current regimen     Mixed hyperlipidemia  On Lipitor 40, taking regularly and tolerating well  Pituitary macroadenoma [facility]):  Pituitary hormones panel showed normal TSH, free T4, normal alpha subunit, normal prolactin, normal FSH, normal LH, normal 24 hours urine for cortisol, normal a m  cortisol and ACTH  IGF-1 was elevated at 292 ng/ml, she does not exhibit any stigmata of acromegaly, I repeated IGF-I and if still elevated I will order oral glucose tolerance test   Serum osmolarity was elevated, however patient was hyperglycemic, she does not report polyuria, polydipsia  I reordered serum osmolarity along with CMP and  specific gravity  pituitary dedicated MRI was ordered,  Follow up with neurosurgery and optholmology     -     MRI brain pituitary wo and w contrast; Future  -     Osmolality-If this is regarding a toxic alcohol, STOP  Test is not routinely indicated  Please consult medical  on call for further guidance ; Future  -     Specific Gravity, Urine  -     Comprehensive metabolic panel;  Future  -     Insulin-like growth factor 1 (IGF-1) Lab Collect; Future        Discussed with the patient and all questioned fully answered  She will call me if any problems arise      Counseled patient on diagnostic results, prognosis, risk and benefit of treatment options, instruction for management, importance of treatment compliance, Risk  factor reduction and impressions      Logan Pope MD

## 2023-04-05 NOTE — PATIENT INSTRUCTIONS
Continue Trulicity 3 mg once weekly, continue metformin  Decrease Lantus to 20 units    Repeat MRI pituitary in June before your neurosurgeon appointment  Follow-up with ophthalmologist  Complete your blood test I will call you for the results

## 2023-04-07 RX ORDER — INSULIN GLARGINE-YFGN 100 [IU]/ML
20 INJECTION, SOLUTION SUBCUTANEOUS DAILY
Qty: 6 ML | Refills: 2 | Status: SHIPPED | OUTPATIENT
Start: 2023-04-07 | End: 2023-07-06

## 2023-04-28 ENCOUNTER — APPOINTMENT (OUTPATIENT)
Dept: LAB | Facility: HOSPITAL | Age: 53
End: 2023-04-28

## 2023-04-28 ENCOUNTER — APPOINTMENT (OUTPATIENT)
Dept: LAB | Facility: CLINIC | Age: 53
End: 2023-04-28

## 2023-04-28 ENCOUNTER — HOSPITAL ENCOUNTER (OUTPATIENT)
Dept: MRI IMAGING | Facility: HOSPITAL | Age: 53
Discharge: HOME | End: 2023-04-28
Attending: STUDENT IN AN ORGANIZED HEALTH CARE EDUCATION/TRAINING PROGRAM

## 2023-04-28 DIAGNOSIS — Z79.4 TYPE 2 DIABETES MELLITUS WITH HYPEROSMOLARITY WITHOUT COMA, WITH LONG-TERM CURRENT USE OF INSULIN (HCC): ICD-10-CM

## 2023-04-28 DIAGNOSIS — E11.00 TYPE 2 DIABETES MELLITUS WITH HYPEROSMOLARITY WITHOUT COMA, WITH LONG-TERM CURRENT USE OF INSULIN (HCC): ICD-10-CM

## 2023-04-28 DIAGNOSIS — D35.2 PITUITARY MACROADENOMA (HCC): ICD-10-CM

## 2023-04-28 LAB
ALBUMIN SERPL BCP-MCNC: 4.6 G/DL (ref 3.5–5)
ALP SERPL-CCNC: 67 U/L (ref 34–104)
ALT SERPL W P-5'-P-CCNC: 24 U/L (ref 7–52)
ANION GAP SERPL CALCULATED.3IONS-SCNC: 7 MMOL/L (ref 4–13)
AST SERPL W P-5'-P-CCNC: 14 U/L (ref 13–39)
BILIRUB SERPL-MCNC: 0.51 MG/DL (ref 0.2–1)
BUN SERPL-MCNC: 9 MG/DL (ref 5–25)
CALCIUM SERPL-MCNC: 9.7 MG/DL (ref 8.4–10.2)
CHLORIDE SERPL-SCNC: 106 MMOL/L (ref 96–108)
CO2 SERPL-SCNC: 27 MMOL/L (ref 21–32)
CREAT SERPL-MCNC: 0.53 MG/DL (ref 0.6–1.3)
GFR SERPL CREATININE-BSD FRML MDRD: 109 ML/MIN/1.73SQ M
GLUCOSE P FAST SERPL-MCNC: 136 MG/DL (ref 65–99)
OSMOLALITY UR/SERPL-RTO: 301 MMOL/KG (ref 282–298)
POTASSIUM SERPL-SCNC: 4.7 MMOL/L (ref 3.5–5.3)
PROT SERPL-MCNC: 7.5 G/DL (ref 6.4–8.4)
SODIUM SERPL-SCNC: 140 MMOL/L (ref 135–147)
SP GR UR STRIP.AUTO: 1.02 (ref 1–1.03)

## 2023-04-28 RX ADMIN — GADOBUTROL 5 ML: 604.72 INJECTION INTRAVENOUS at 14:34

## 2023-05-02 LAB — IGF-I SERPL-MCNC: 225 NG/ML (ref 65–216)

## 2023-06-26 ENCOUNTER — TELEPHONE (OUTPATIENT)
Dept: NEUROLOGY | Facility: CLINIC | Age: 53
End: 2023-06-26

## 2023-06-26 NOTE — TELEPHONE ENCOUNTER
Pt is having a mastectomy ASAP and need medical clearance  They are going to be faxing over forms       C/b [phone]   Cristina Garcia is doing surgery  Fax- [phone]

## 2023-07-19 ENCOUNTER — OFFICE VISIT (OUTPATIENT)
Dept: NEUROSURGERY | Facility: CLINIC | Age: 53
End: 2023-07-19
Payer: COMMERCIAL

## 2023-07-19 VITALS
TEMPERATURE: 98 F | HEART RATE: 105 BPM | HEIGHT: 63 IN | BODY MASS INDEX: 20.55 KG/M2 | DIASTOLIC BLOOD PRESSURE: 80 MMHG | SYSTOLIC BLOOD PRESSURE: 138 MMHG | WEIGHT: 116 LBS | OXYGEN SATURATION: 98 %

## 2023-07-19 DIAGNOSIS — Z86.73 HISTORY OF TIA (TRANSIENT ISCHEMIC ATTACK): ICD-10-CM

## 2023-07-19 DIAGNOSIS — I65.21 CAROTID STENOSIS, ASYMPTOMATIC, RIGHT: ICD-10-CM

## 2023-07-19 DIAGNOSIS — M48.02 STENOSIS OF CERVICAL SPINE WITH MYELOPATHY (HCC): ICD-10-CM

## 2023-07-19 DIAGNOSIS — E78.5 HYPERLIPIDEMIA, UNSPECIFIED HYPERLIPIDEMIA TYPE: ICD-10-CM

## 2023-07-19 DIAGNOSIS — D35.2 PITUITARY ADENOMA (HCC): Primary | ICD-10-CM

## 2023-07-19 DIAGNOSIS — G99.2 STENOSIS OF CERVICAL SPINE WITH MYELOPATHY (HCC): ICD-10-CM

## 2023-07-19 PROCEDURE — 99215 OFFICE O/P EST HI 40 MIN: CPT | Performed by: NURSE PRACTITIONER

## 2023-07-19 NOTE — PROGRESS NOTES
Assessment/Plan:    Stenosis of cervical spine with myelopathy (HCC)  • As addressed in HPI   • Symptoms of myelopathy with progressive cord compression   • As addressed in PE  • mJOA = 14 Reports she is ambidextrous, use right more than left, is left Hand dominate when doing  doing physical work. Has noticed weakness in left hand when using cutlery. Is using both hands to compenaate. Reports she can button clothing without difficultly. Reports penmanship has worsened over the past 2 years, writes using right hand. Balance --use ralings when descending steps,Feels balance is more off when descending steps. Denies caudia equnia problems affecting bowel, bladde or saddle anesthesia. • Reports left finger and index finger numbness. Numbness left and right hand fingers improved. Reports improved gait since last visit, no mor wobbling. .  • Report weakness sin left arm improved. • Reports improvement in emotions , anxious behavior. • No change or progression in myelopathic symptoms from prior visit      Imagining  · 12/9/2022 MRI cervical spine w/wo    · Degenerative spondylosis with disc osteophyte complexes causing severe canal stenosis at C4-5 and C5-6 with mild cord compression.  Tiny focus of myelomalacia suggested at C5-6. · Moderate canal stenosis at C6-7 with at least moderately severe foraminal stenosis. · Reviewed in collaboration with Dr. Meredith Barrett ---neurosurgical intervention indicated.      Plan  · No change in neurosurgical management continue q 6 months visit until other conditions stabilized to proceed with neurosurgery . · Reports she has recurrent malignant neoplasm of left breast cancer and must undergo a mastectomy. Surgery scheduled for 7/25/23. From a neurosurgical stand point --no contraindication to proceeding with Breast surgery /mastectomy , maintain cervical spine precautions.  Keep MAP  > 80  •  Advised to avoid falling , cervical hyperextension, minimize cervical ROM, avoid rear end trauma avoid heavy lifting greater alia 10-20 lbs   • Reviewed Red flag symptoms /symptoms of worsening myelopathy . • Schedule f/u office visit in 6 months sooner if myelopathic symptoms worsen, appointment solo w/ me on a Day Dr. Meet Weems in the office. • Advised to call if additional questions or concerns. • No imagining required at this time fo rf/u visit.           History of TIA (transient ischemic attack)  · As addressed in HPI  · Endocrinology managing and following pituitary labs. · Visual fields completed [id number] [facility] May 26 23    Reports in [facility] . -VF do not demonstrate a field deficit c/w pituitary adenoma . DMII , no ocular complications. No evidence of diabetic retinopathy    · Denies visual disturbances     Imagining   4/28/23 MRI BRAIN AND SELLA  WITH AND WITHOUT CONTRAST1. Stable 1.1 cm intrasellar/intrapituitary mass. Differential diagnosis remains Rathke's cleft cyst versus adenoma. Other mass lesions not excluded. No interval growth or mass effect on the optic chiasm. Continued clinical and imaging surveillance recommended. 2. Scattered white matter lesions are nonspecific and could be related to precocious microangiopathy, especially if there are cerebrovascular risk factors. Other post infectious, post inflammatory or demyelinating processes including multiple sclerosis or Lyme disease could be considered in the differential diagnosis. Patients with migraine headaches or vasculitis can also have similar white matter changes. Further clinical assessment advised. 3. No acute infarction. Plan   · Reviewed MRI pituitary w/wo   · Follow-up in 1 year with MRI pituitary w/wo due 4/28/2024 --ordered and scheduled   · Endo ordering pituitary labs and managing,   · Patient reports has a scheduled yearly appointment for 2024 with [id number] [place] [address] , will complete visual fields prior appointment ---neuro surgeyr will procure completed report.   · Advised to call office with additional questions or concerns             Subjective: Presents as a f/u for initial consult visit 1/4/2023  For imagining review and reassessment     Patient ID: Heaven Olivas is a 47 y.o. female PM/SH DM2 controlled HgA1C 9.2 , Atrial septal defect  Status post repair transcatheter  closure  of congential  Caroid artery stenosis asymptomatic, HLD , aresthesia, TIA, HTN, DCIS-Ductal cancer-malignant neoplasm left breast, anemia    HPI    Seen in the ED 12/8/2022  For memory loss could no remember names of coworkers, had difficultly writing names of coworkers felt as though hand and brain wa snot working together. Has TIA symptoms , numbness of the ead and occipital area into frontal, numbness in feet with distribution up into  Legs , more pronounced on the left side. Called her PCP advised to drive to the ED. Examined in ED then admitted 12/8/22  Through 12/1/22,,    Imagining showed severe cervical stenosis w/ cord compression and myelomalacia  and pituitary mass she was referred to neurosurgery for outpatient office follow-up   Imagining   • 12/10/2022  MRI brain  With  Contrast Stable 0.6 x 0.7 x 1.1 cm 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 measuring  without definite enhancement. 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 but endocrinological evaluation and follow-up is recommended. No abnormal enhancement of the brain.     • 12/9/2022 MRI brain wo contrast Incidental 0.7 cm T1 hyperintense lesion within posterior pituitary gland, could be cystic microadenoma versus Rathke's cleft cyst.  Recommend correlation with hormonal levels and follow-up with pituitary protocol brain MRI. Pituitary stalk is also displaced anteriorly and superiorly. Cavernous sinuses enhance normally. SELLA AND PITUITARY GLAND:  There is a 0.7 cm T1 hyperintense lesion within posterior pituitary gland (series 4 image 12)    Cervical spine workup Reports pain in posterior cervical area with distribution into bilateral shoulders,associated with  numbness in fingers. The second finger is constant while other fingers are intermittent , no numbness in the 5th finger. Reports when turning neck has cracking. She denies radcular pain down both arms . Constat  Numbness in second finger. Pain distribution from the neck up into the base of the skull. Symptoms worse on the left. Several years ago had bilateral shoulder pain and discomfort  Neck pain had recently started about 1 week prior visit  No inciting event to onset of symptoms. Severity of symptoms was 8-9/10/  Quality  -shoulder-like muscle constriction tight , cervical and head symptoms are  aching, not stabbing, are difficult to describe. Fingers has numbness mostly wiith intermittent tingling , swelling in the second finger of left hand. Aggrvating factor   ---lying in bed on 1 pillow is worse , cervical roatation causes pian up into neck , elevation of left arm at shoulder above head causes pain into shoulder and into neck.    Multiple modal treatments , Physical therapy about several years prior visit for shoulder pain. She had never received pain management injections/interventional treatment. .    she had significant multilevel cervical stenosis     MRI cervical spine demonstrated surgical pathology. No immediate cervical spine surgery indicated , mildly  myelopathic. Due to severity of cervical stenosis schedule every 6 month f/u visit . DR. Merrick Morton met with patient and explained the following   • Surgical Pathology on MRI   · Neurosurgery plan for intervention  Surgical procedure ( 3 level ACDF  Risks, benefits , goal. Overnight hospital stay  With next day admit . Wear Aspen cervical collar for about 4 weeks. · She required multiple clearances before could proceed with cervical spine surgery. .    Cervical spine teaching Provided at initial visit  • Advised to avoid falling , cervical hyperextension, minimize cervical ROM, avoid rear end trauma avoid heavy lifting greater alia 10-20 lbs   • Schedule f/u office visit in 6 months sooner if myelopathic symptoms worsen  • Reviewed Red flag symptoms /symptoms of worsening myelopathy . She had several comorbid conditions that posed risk for proceeding with surgery  · DM2--. HgA1C 9.2.endocrine consulted for DM uncontrolled;ed and pituitary mass and ordered associated labs. Ordered 6 month f/u for pituitary adenoma vs Tarlov cyst  · Vascular-Cartoid artery stenosis, severe left ICA--was asymptomatic, in need of a vascular consult workup , referral made . · Neurology old lacunar infract required continued  f/u to hospital visit. An outpatient appointment was scheduled. ASA and simvastatin was started in hospital   · Cardiology --Dr. Liliana Shannon cardiology [facility]---10/19/2017 note Luz Telles Diagnosed with secundum ASD 4/2013 with a significant L to R shunt. She underwent ASD closure on 12/2013 at [facility] and was last seen by Cardiology 10/2014 at [facility] with an echo as noted below. Cardiology follow up was not recommended or needed. She presents today for cervical reassessment, review of pituitary imagining and associated labs, and reassess status of speciality referrals  To optimize medical status/ comorbid conditions in preparation for cervical spine surgery. 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 I  ,  reently diagnosed with esophageal cancer. REVIEW OF SYSTEMS  Review of Systems   Constitutional: Positive for chills (from times to times). HENT: Negative. Eyes: Negative for visual disturbance (improve new contact). Respiratory: Negative. Cardiovascular: Negative. Gastrointestinal: Negative for diarrhea (soft stool). Endocrine: Negative. Menopausal, sweats last night   Genitourinary: Negative. Musculoskeletal: Negative for gait problem (improve ) and neck stiffness. Neck pain: cracking. Skin: Negative.     Allergic/Immunologic: Positive for food allergies. Neurological: Positive for weakness (improve ) and numbness (left fingers, index finger constant. also gets imtermittent head numbness and L>R legs, only from time to time. ). Negative for headaches. Hematological: Does not bruise/bleed easily (time to time). Psychiatric/Behavioral: The patient is not nervous/anxious (improve). Mental status changes when presented to hospital in December  Still feeling forgetful, memory difficulty         Meds/Allergies     Current Outpatient Medications   Medication Sig Dispense Refill   • atorvastatin (LIPITOR) 40 mg tablet Take 1 tablet (40 mg total) by mouth daily 30 tablet 3   • B-D ULTRAFINE III SHORT PEN 31G X 8 MM MISC      • ferrous sulfate 325 (65 Fe) mg tablet Take 325 mg by mouth daily with breakfast     • losartan (COZAAR) 50 mg tablet      • metFORMIN (GLUCOPHAGE) 1000 MG tablet Take 1,000 mg by mouth daily with dinner     • acetaminophen (TYLENOL) 325 mg tablet Take 2 tablets (650 mg total) by mouth every 6 (six) hours as needed for mild pain or fever (Patient not taking: Reported on 1/27/2023) 30 tablet 0   • aspirin 81 mg chewable tablet Chew 1 tablet (81 mg total) daily Do not start before December 11, 2022. (Patient not taking: Reported on 7/19/2023) 30 tablet 0     No current facility-administered medications for this visit.        Allergies   Allergen Reactions   • Dapagliflozin      Facial swelling       PAST HISTORY    Past Medical History:   Diagnosis Date   • Cancer ([address])     breast   • Diabetes mellitus ([address])    • Hypertension        Past Surgical History:   Procedure Laterality Date   • BREAST LUMPECTOMY Left    • DILATION AND CURETTAGE OF UTERUS     • LYMPHADENECTOMY         Social History     Tobacco Use   • Smoking status: Never   • Smokeless tobacco: Never   Vaping Use   • Vaping Use: Never used   Substance Use Topics   • Alcohol use: Yes     Comment: socially   • Drug use: No       Family History   Problem Relation Age of Onset   • Diabetes Mother        The following portions of the patient's history were reviewed and updated as appropriate: allergies, current medications, past family history, past medical history, past social history, past surgical history and problem list.      EXAM    Vitals:Blood pressure 138/80, pulse 105, temperature 98 °F (36.7 °C), temperature source Temporal, height 5' 3" (1.6 m), weight 52.6 kg (116 lb), SpO2 98 %. ,Body mass index is 20.55 kg/m². Physical Exam  Vitals and nursing note reviewed. Exam conducted with a chaperone present (Femal Friend). Constitutional:       General: She is not in acute distress. Appearance: Normal appearance. She is not ill-appearing or diaphoretic. HENT:      Head: Normocephalic and atraumatic. Neck:      Comments: Deliberate limitation in cervical ROM   Pulmonary:      Effort: Pulmonary effort is normal. No respiratory distress. Musculoskeletal:      Cervical back: No tenderness. Right lower leg: No edema. Left lower leg: No edema. Neurological:      Mental Status: She is alert and oriented to person, place, and time. Sensory: No sensory deficit. Motor: No weakness. Coordination: Coordination normal. Finger-Nose-Finger Test and Heel to Be Test normal.      Gait: Gait is intact. Gait normal.      Deep Tendon Reflexes: Reflexes normal.      Reflex Scores:       Tricep reflexes are 2+ on the right side and 2+ on the left side. Bicep reflexes are 2+ on the right side and 2+ on the left side. Brachioradialis reflexes are 2+ on the right side and 2+ on the left side. Patellar reflexes are 2+ on the right side and 2+ on the left side. Achilles reflexes are 1+ on the right side and 1+ on the left side. Psychiatric:         Mood and Affect: Mood normal.         Behavior: Behavior normal.         Neurologic Exam     Mental Status   Oriented to person, place, and time.    Level of consciousness: alert    Motor Exam     Strength   Right deltoid: 5/5  Left deltoid: 5/5  Right biceps: 5/5  Left biceps: 5/5  Right triceps: 5/5  Left triceps: 5/5  Right wrist flexion: 5/5  Left wrist flexion: 5/5  Right wrist extension: 5/5  Left wrist extension: 5/5  Right interossei: 5/5  Left interossei: 5/5  Right iliopsoas: 5/5  Left iliopsoas: 5/5  Right quadriceps: 5/5  Left quadriceps: 5/5  Right hamstrin/5  Left hamstrin/5  Right glutei: 5/5  Left glutei: 5/5  Right anterior tibial: 5/5  Left anterior tibial: 5/5  Right gastroc: 5/5  Left gastroc: 5/5    Sensory Exam   Light touch normal.     Gait, Coordination, and Reflexes     Gait  Gait: normal    Coordination   Finger to nose coordination: normal  Heel to shin coordination: normal    Reflexes   Right brachioradialis: 2+  Left brachioradialis: 2+  Right biceps: 2+  Left biceps: 2+  Right triceps: 2+  Left triceps: 2+  Right patellar: 2+  Left patellar: 2+  Right achilles: 1+  Left achilles: 1+  Right : 2+  Left : 2+  Right Romo: absent  Left Romo: absent  Right ankle clonus: absent  Left ankle clonus: absentcan heel and toe walk       Imaging Studies    23 MRI BRAIN AND SELLA  WITH AND WITHOUT CONTRAST  COMPARISON: 2022     TECHNIQUE:  Multiplanar, multisequence imaging of the brain and sella was performed before and after gadolinium administration.     Targeted images of the sella were performed requiring additional time at acquisition and interpretation of approximately 25%     IV Contrast:  5 mL of Gadobutrol injection (SINGLE-DOSE)     IMAGE QUALITY:  Diagnostic.     FINDINGS:     BRAIN PARENCHYMA:  There is no discrete mass, mass effect or midline shift. Brainstem and cerebellum demonstrate normal signal. There is no intracranial hemorrhage. There is no evidence of acute infarction and diffusion imaging is unremarkable.    Scattered subcortical and to lesser degree periventricular white matter lesions in the supratentorial brain are slightly increased from the prior study. Normal postcontrast imaging.     VENTRICLES:  Normal for the patient's age.     SELLA AND PITUITARY GLAND: Again demonstrated is an intrasellar mass lesion of differential enhancement with intrinsic T1 high signal intensity. The lesion didn't enhances on postcontrast imaging, measuring 0.7 cm on maximal craniocaudal dimension series   10, image 7, demonstrated diminished signal on postcontrast imaging compared to normal adjacent enhancing parenchymal tissue. Maximal transverse dimension remains 1.1 cm on series 9 image 9. The superior margin of the pituitary gland remains convex. The   mass approaches but does not compress the optic chiasm, a thin 1 mm cleft between the superior margin of the mass and the undersurface of the optic chiasm is stable. The infundibulum is difficult to perceive discretely. Cavernous sinuses are intact.     ORBITS:  Normal.     PARANASAL SINUSES:  Normal.     VASCULATURE:  Evaluation of the major intracranial vasculature demonstrates appropriate flow voids.     CALVARIUM AND SKULL BASE:  Normal.     EXTRACRANIAL SOFT TISSUES:  Normal.     IMPRESSION:      1. Stable 1.1 cm intrasellar/intrapituitary mass. Differential diagnosis remains Rathke's cleft cyst versus adenoma. Other mass lesions not excluded. No interval growth or mass effect on the optic chiasm. Continued clinical and imaging surveillance   recommended. 2. Scattered white matter lesions are nonspecific and could be related to precocious microangiopathy, especially if there are cerebrovascular risk factors. Other post infectious, post inflammatory or demyelinating processes including multiple sclerosis   or Lyme disease could be considered in the differential diagnosis. Patients with migraine headaches or vasculitis can also have similar white matter changes. Further clinical assessment advised. 3. No acute infarction.     I have personally reviewed pertinent reports.    and I have personally reviewed pertinent films in PACS    I have spent a total time of 45 minutes on 07/19/23 in caring for this patient including Diagnostic results, Risks and benefits of tx options, Instructions for management, Patient and family education, Importance of tx compliance, Risk factor reductions, Impressions, Counseling / Coordination of care, Documenting in the medical record, Reviewing / ordering tests, medicine, procedures  , Obtaining or reviewing history   and Communicating with other healthcare professionals .

## 2023-07-19 NOTE — TELEPHONE ENCOUNTER
Hello,     I have received a call from Devi from Neurosurgery to schedule the patient 14 day's after her vasectomy surgery, per The neurosurgeon he would like patient to be seen 14 day's after surgery due to medications. The first available is on 7/27/2023 with Aj Awad but she cancelled this appointment as she will be two day's post op surgery. The first available appointment is not until 8/25 or 08/31 with [facility], Per LOV on 01/27/2023 patient can be seen by him or any of the Ap's or . Can you please advise if there's a hold slot we offer this patient sooner and do you agree to see this patient Anu?     Thank you all for your time and help,     Grey Kehr

## 2023-07-20 NOTE — TELEPHONE ENCOUNTER
Perfect that is what I thought. She needs her routine follow up with Lucas rescheduled. His next available is fine. She should resume ASA 2-3 days after her surgery per Lucas's clearance form indicates. No need for sooner appt with me.

## 2023-07-20 NOTE — ASSESSMENT & PLAN NOTE
· As addressed in HPI  · Endocrinology managing and following pituitary labs. · Visual fields completed [id number] [facility] May 26 23    Reports in [facility] . -VF do not demonstrate a field deficit c/w pituitary adenoma . DMII , no ocular complications. No evidence of diabetic retinopathy    · Denies visual disturbances     Imagining   4/28/23 MRI BRAIN AND SELLA  WITH AND WITHOUT CONTRAST1. Stable 1.1 cm intrasellar/intrapituitary mass. Differential diagnosis remains Rathke's cleft cyst versus adenoma. Other mass lesions not excluded. No interval growth or mass effect on the optic chiasm. Continued clinical and imaging surveillance recommended. 2. Scattered white matter lesions are nonspecific and could be related to precocious microangiopathy, especially if there are cerebrovascular risk factors. Other post infectious, post inflammatory or demyelinating processes including multiple sclerosis or Lyme disease could be considered in the differential diagnosis. Patients with migraine headaches or vasculitis can also have similar white matter changes. Further clinical assessment advised. 3. No acute infarction. Plan   · Reviewed MRI pituitary w/wo   · Follow-up in 1 year with MRI pituitary w/wo due 4/28/2024 --ordered and scheduled   · Endo ordering pituitary labs and managing,   · Patient reports has a scheduled yearly appointment for 2024 with [id number] [facility] , will complete visual fields prior appointment ---neuro surgeyr will procure completed report.   · Advised to call office with additional questions or concerns

## 2023-07-20 NOTE — TELEPHONE ENCOUNTER
Navi Quinonez,    Can you confirm the below request?  I see Junito Turcios is scheduled for a bilateral skin sparing mastectomy by Dr. Sachin Hobbs on 7/25/23. I believe Mary Auguste already cleared her to hold Aspirin for 7 days prior and 2-3 days after. Why does she need to follow up with Neurology 14 days after her mastectomy and what medications are we addressing? ASA and Lipitor? I am trying to determine if this patient can wait to follow up with Mary Auguste (for continuity) or if there is a urgent/pressing issue that she needs to be added to my schedule more quickly? Thanks for any additional info you can provide!   Monse

## 2023-07-20 NOTE — ASSESSMENT & PLAN NOTE
• As addressed in HPI   • Symptoms of myelopathy with progressive cord compression   • As addressed in PE  • mJOA = 14 Reports she is ambidextrous, use right more than left, is left Hand dominate when doing  doing physical work. Has noticed weakness in left hand when using cutlery. Is using both hands to compenaate. Reports she can button clothing without difficultly. Reports penmanship has worsened over the past 2 years, writes using right hand. Balance --use ralings when descending steps,Feels balance is more off when descending steps. Denies caudia equnia problems affecting bowel, bladde or saddle anesthesia. • Reports left finger and index finger numbness. Numbness left and right hand fingers improved. Reports improved gait since last visit, no mor wobbling. .  • Report weakness sin left arm improved. • Reports improvement in emotions , anxious behavior. • No change or progression in myelopathic symptoms from prior visit      Imagining  · 12/9/2022 MRI cervical spine w/wo    · Degenerative spondylosis with disc osteophyte complexes causing severe canal stenosis at C4-5 and C5-6 with mild cord compression.  Tiny focus of myelomalacia suggested at C5-6. · Moderate canal stenosis at C6-7 with at least moderately severe foraminal stenosis. · Reviewed in collaboration with Dr. Sita Bar ---neurosurgical intervention indicated.      Plan  · No change in neurosurgical management continue q 6 months visit until other conditions stabilized to proceed with neurosurgery . · Reports she has recurrent malignant neoplasm of left breast cancer and must undergo a mastectomy. Surgery scheduled for 7/25/23. From a neurosurgical stand point --no contraindication to proceeding with Breast surgery /mastectomy , maintain cervical spine precautions.  Keep MAP  > 80  •  Advised to avoid falling , cervical hyperextension, minimize cervical ROM, avoid rear end trauma avoid heavy lifting greater alia 10-20 lbs   • Reviewed Red flag symptoms /symptoms of worsening myelopathy . • Schedule f/u office visit in 6 months sooner if myelopathic symptoms worsen, appointment solo w/ me on a Day Dr. Light Crew in the office. • Advised to call if additional questions or concerns.    • No imagining required at this time fo rf/u visit.

## 2023-07-20 NOTE — TELEPHONE ENCOUNTER
Elena Curtis is under yout care for secondary stroke prevention. You manage her ASA and lipitor . She had a f/u appointment scheduled with your office 7/27 but cancelled d/t major sx 7/25.  She was advised to reschedule cancelled appointment  and determine fron neurology how long she can remain off ASA  post op ,. Neurology ordered for secondary stroke prevention

## 2023-07-22 ENCOUNTER — TELEPHONE (OUTPATIENT)
Dept: NEUROLOGY | Facility: CLINIC | Age: 53
End: 2023-07-22

## 2023-07-24 RX ORDER — ATORVASTATIN CALCIUM 40 MG/1
40 TABLET, FILM COATED ORAL DAILY
Qty: 90 TABLET | Refills: 2 | Status: SHIPPED | OUTPATIENT
Start: 2023-07-24

## 2023-08-04 DIAGNOSIS — R90.89 ABNORMAL FINDING ON MRI OF BRAIN: Primary | ICD-10-CM

## 2023-08-07 ENCOUNTER — TELEPHONE (OUTPATIENT)
Dept: ENDOCRINOLOGY | Facility: CLINIC | Age: 53
End: 2023-08-07

## 2023-08-07 NOTE — TELEPHONE ENCOUNTER
----- Message from Makayla Sierra MD sent at 8/4/2023 10:44 AM EDT -----  Could you please notify patient that I ordered a blood test which should be done in [facility] prior to her next visit.

## 2023-08-10 ENCOUNTER — TELEPHONE (OUTPATIENT)
Dept: NEUROLOGY | Facility: CLINIC | Age: 53
End: 2023-08-10

## 2023-08-10 NOTE — TELEPHONE ENCOUNTER
Spoke with pt in regards to offering a sooner appt for 08/11/23 but pt is unable to attended the appts offered due to other appts. , keeping original appt.

## 2023-08-25 ENCOUNTER — APPOINTMENT (OUTPATIENT)
Dept: LAB | Facility: CLINIC | Age: 53
End: 2023-08-25
Payer: COMMERCIAL

## 2023-08-25 DIAGNOSIS — R90.89 ABNORMAL FINDING ON MRI OF BRAIN: ICD-10-CM

## 2023-08-25 DIAGNOSIS — R90.89 ABNORMAL COMPUTERIZED TOMOGRAPHY OF BRAIN: Primary | ICD-10-CM

## 2023-08-25 PROCEDURE — 36415 COLL VENOUS BLD VENIPUNCTURE: CPT

## 2023-08-25 PROCEDURE — 83519 RIA NONANTIBODY: CPT

## 2023-08-27 LAB — IGF BP3 SERPL-MCNC: 5170 UG/L (ref 2306–5703)

## 2023-08-29 ENCOUNTER — OFFICE VISIT (OUTPATIENT)
Dept: ENDOCRINOLOGY | Facility: CLINIC | Age: 53
End: 2023-08-29
Payer: COMMERCIAL

## 2023-08-29 VITALS
DIASTOLIC BLOOD PRESSURE: 80 MMHG | SYSTOLIC BLOOD PRESSURE: 100 MMHG | BODY MASS INDEX: 20.02 KG/M2 | TEMPERATURE: 97.8 F | OXYGEN SATURATION: 98 % | RESPIRATION RATE: 16 BRPM | WEIGHT: 113 LBS | HEART RATE: 104 BPM | HEIGHT: 63 IN

## 2023-08-29 DIAGNOSIS — I10 PRIMARY HYPERTENSION: ICD-10-CM

## 2023-08-29 DIAGNOSIS — E78.2 MIXED HYPERLIPIDEMIA: ICD-10-CM

## 2023-08-29 DIAGNOSIS — D35.2 PITUITARY ADENOMA (HCC): ICD-10-CM

## 2023-08-29 DIAGNOSIS — Z79.4 TYPE 2 DIABETES MELLITUS WITH HYPEROSMOLARITY WITHOUT COMA, WITH LONG-TERM CURRENT USE OF INSULIN (HCC): Primary | ICD-10-CM

## 2023-08-29 DIAGNOSIS — E11.00 TYPE 2 DIABETES MELLITUS WITH HYPEROSMOLARITY WITHOUT COMA, WITH LONG-TERM CURRENT USE OF INSULIN (HCC): Primary | ICD-10-CM

## 2023-08-29 PROCEDURE — 99214 OFFICE O/P EST MOD 30 MIN: CPT | Performed by: STUDENT IN AN ORGANIZED HEALTH CARE EDUCATION/TRAINING PROGRAM

## 2023-08-29 PROCEDURE — 95250 CONT GLUC MNTR PHYS/QHP EQP: CPT | Performed by: STUDENT IN AN ORGANIZED HEALTH CARE EDUCATION/TRAINING PROGRAM

## 2023-08-29 RX ORDER — INSULIN GLARGINE-YFGN 100 [IU]/ML
20 INJECTION, SOLUTION SUBCUTANEOUS
Qty: 18 ML | Refills: 0 | Status: SHIPPED | OUTPATIENT
Start: 2023-08-29 | End: 2023-11-27

## 2023-08-29 RX ORDER — DULAGLUTIDE 3 MG/.5ML
3 INJECTION, SOLUTION SUBCUTANEOUS
Qty: 6 ML | Refills: 0 | Status: SHIPPED | OUTPATIENT
Start: 2023-08-29 | End: 2023-11-27

## 2023-08-29 RX ORDER — DULAGLUTIDE 3 MG/.5ML
INJECTION, SOLUTION SUBCUTANEOUS
COMMUNITY
Start: 2023-07-05 | End: 2023-08-29 | Stop reason: SDUPTHER

## 2023-08-29 NOTE — PROGRESS NOTES
Yesi Ibarra 47 y.o. female MRN: [id number]    Encounter: [id number]      Assessment/Plan     1. Type 2 diabetes mellitus with hyperosmolarity without coma, with long-term current use of insulin (HCC)  Glycemic control is worsening indicated to her CGM report, however she could not be consistent with her regimen and diet due to recent diagnosis of breast cancer. We will continue with current regimen, next option is to increase Trulucity to 4.5 mg weekly,    - Diabetic foot exam; Future  - Insulin Glargine-yfgn (Semglee, yfgn,) 100 UNIT/ML SOPN; Inject 0.2 mL (20 Units total) under the skin daily at bedtime  Dispense: 18 mL; Refill: 0  - Trulicity 3 OH/1.1US injection; Inject 0.5 mL (3 mg total) under the skin every 7 days  Dispense: 6 mL; Refill: 0  - Hemoglobin A1C; Future  - Comprehensive metabolic panel; Future  - Vitamin D 25 hydroxy; Future  - Albumin / creatinine urine ratio; Future    2. Pituitary adenoma ([address])  an incidental 0.6x0. 7x1.1cm lesion in the posterior pituitary gland, she is following with neurosurgery which are planning to repeat MRI. Patient has seen ophthalmology and visual field eye exam was done. Biochemical testing have been negative for hypersecretion or hyposecretion except for elevated IGF-1 with no stigmata of acromegaly. Normal IGF binding protein-3,  I repeated IGF-1 in [facility], if still elevated, GH suppression test will be done. P- Insulin-like growth factor 1 (IGF-1) Lab Collect; Future    3. Primary hypertension  Controlled. - Comprehensive metabolic panel; Future  - Vitamin D 25 hydroxy; Future    4. Mixed hyperlipidemia  Continue Lipitor,    - Lipid Panel with Direct LDL reflex; Future      CC:     Pituitary adenoma    History of Present Illness     HPI:    Dylon Parker is a 47year-old female who presented for follow up for pituitary adenoma and diabetes. Patient had a brain MRI for evaluation for TIA which showed an incidental 0.6x0. 7x1.1cm lesion in the posterior pituitary gland. Rathke's cyst vs cystic adenoma was differentials. Patient is following with neurosurgery which are planning to repeat MRI. Patient has seen ophthalmology and visual field eye exam was done. Patient denies headaches, weight loss or weight gain, decreased appetite, heat intolerance, cold intolerance, hair loss, dry skin, brittle nails, weakness, fatigue, generalized weakness, proximal muscle weakness, easy bruising, purple colored striae, increased hair growth, acne, hypertension, fracture, nausea or vomiting, increased thirst or urination  Pituitary hormones panel showed normal TSH, free T4, normal alpha subunit, normal prolactin, normal FSH, normal LH, normal 24 hours urine for cortisol, normal a.m. cortisol and ACTH. IGF-1 was elevated at 292 ng/ml, repeated was elevated as well.she does not exhibit any stigmata of acromegaly      For diabetes she is taking Trulucity 3 mg once weekly and Metformin 1000 mg bid    She is using CGM, kellen 2. HItviews   Device used,Kellen 2  Home use     Indication   Type 2 Diabetes      More than 72 hours of data was reviewed. Report to be scanned to chart. Date Range: August 16- August 29th    Analysis of data:   Average Glucose: 189 mg/dl  Coefficient of Variation: 22.2%   SD : x   Time in Target Range: 52%   Time Above Range: 48%   Time Below Range: 0%       Review of Systems   Constitutional: Negative for appetite change, fatigue and unexpected weight change. HENT: Negative for trouble swallowing and voice change. Eyes: Negative for visual disturbance. Respiratory: Negative for cough, shortness of breath and wheezing. Cardiovascular: Negative for palpitations and leg swelling. Gastrointestinal: Negative for abdominal pain, constipation, diarrhea, nausea and vomiting. Endocrine: Negative for cold intolerance, heat intolerance, polyphagia and polyuria. Musculoskeletal: Negative for arthralgias.    Skin: Negative for color change, rash and wound. Neurological: Negative for dizziness, tremors, weakness, light-headedness, numbness and headaches. Psychiatric/Behavioral: Negative for agitation and sleep disturbance. The patient is not nervous/anxious. Historical Information   Past Medical History:   Diagnosis Date   • Cancer ([address])     breast   • Diabetes mellitus ([address])    • Hypertension      Past Surgical History:   Procedure Laterality Date   • BREAST LUMPECTOMY Left    • DILATION AND CURETTAGE OF UTERUS     • LYMPHADENECTOMY       Social History   Social History     Substance and Sexual Activity   Alcohol Use Yes    Comment: socially     Social History     Substance and Sexual Activity   Drug Use No     Social History     Tobacco Use   Smoking Status Never   Smokeless Tobacco Never     Family History:   Family History   Problem Relation Age of Onset   • Diabetes Mother        Meds/Allergies   Current Outpatient Medications   Medication Sig Dispense Refill   • acetaminophen (TYLENOL) 325 mg tablet Take 2 tablets (650 mg total) by mouth every 6 (six) hours as needed for mild pain or fever (Patient not taking: Reported on 1/27/2023) 30 tablet 0   • aspirin 81 mg chewable tablet Chew 1 tablet (81 mg total) daily Do not start before December 11, 2022. (Patient not taking: Reported on 7/19/2023) 30 tablet 0   • atorvastatin (LIPITOR) 40 mg tablet Take 1 tablet (40 mg total) by mouth daily 90 tablet 2   • B-D ULTRAFINE III SHORT PEN 31G X 8 MM MISC      • ferrous sulfate 325 (65 Fe) mg tablet Take 325 mg by mouth daily with breakfast     • losartan (COZAAR) 50 mg tablet      • metFORMIN (GLUCOPHAGE) 1000 MG tablet Take 1,000 mg by mouth daily with dinner       No current facility-administered medications for this visit. Allergies   Allergen Reactions   • Dapagliflozin      Facial swelling       Objective   Vitals: There were no vitals taken for this visit.     Physical Exam  Constitutional:       Appearance: She is well-developed. HENT:      Head: Normocephalic and atraumatic. Nose: Nose normal.   Eyes:      Pupils: Pupils are equal, round, and reactive to light. Neck:      Thyroid: No thyromegaly. Vascular: No JVD. Cardiovascular:      Rate and Rhythm: Normal rate and regular rhythm. Heart sounds: Normal heart sounds. No murmur heard. No friction rub. No gallop. Pulmonary:      Effort: Pulmonary effort is normal. No respiratory distress. Breath sounds: Normal breath sounds. No stridor. No wheezing or rales. Chest:      Chest wall: No tenderness. Abdominal:      General: Bowel sounds are normal. There is no distension. Palpations: Abdomen is soft. There is no mass. Tenderness: There is no abdominal tenderness. There is no guarding. Musculoskeletal:         General: No deformity. Normal range of motion. Cervical back: Normal range of motion. Skin:     General: Skin is warm. Neurological:      Mental Status: She is alert and oriented to person, place, and time. The history was obtained from the review of the chart, patient.     Lab Results:   Lab Results   Component Value Date/Time    Potassium 4.7 04/28/2023 11:44 AM    Potassium 4.2 01/07/2023 10:22 AM    Potassium 4.4 12/10/2022 06:09 AM    Chloride 106 04/28/2023 11:44 AM    Chloride 105 01/07/2023 10:22 AM    Chloride 105 12/10/2022 06:09 AM    CO2 27 04/28/2023 11:44 AM    CO2 23 01/07/2023 10:22 AM    CO2 26 12/10/2022 06:09 AM    BUN 9 04/28/2023 11:44 AM    BUN 16 01/07/2023 10:22 AM    BUN 18 12/10/2022 06:09 AM    Creatinine 0.53 (L) 04/28/2023 11:44 AM    Creatinine 0.71 01/07/2023 10:22 AM    Creatinine 0.55 (L) 12/10/2022 06:09 AM    Glucose, Fasting 136 (H) 04/28/2023 11:44 AM    Glucose, Fasting 320 (H) 01/07/2023 10:22 AM    Glucose, Fasting 173 (H) 12/09/2022 05:14 AM    Calcium 9.7 04/28/2023 11:44 AM    Calcium 9.5 01/07/2023 10:22 AM    Calcium 9.0 12/10/2022 06:09 AM    eGFR 109 04/28/2023 11:44 AM    eGFR 98 01/07/2023 10:22 AM    eGFR 108 12/10/2022 06:09 AM    TSH 3RD GENERATON 1.070 01/07/2023 10:22 AM    TSH 3RD GENERATON 1.506 12/09/2022 05:14 AM    Free T4 0.98 01/07/2023 10:22 AM    Prolactin 4.4 01/07/2023 10:22 AM    Testosterone, Free 2.2 01/09/2023 07:58 AM    FSH 48.1 01/07/2023 10:22 AM    LH 26.6 01/07/2023 10:22 AM             Imaging Studies:  Results for orders placed during the hospital encounter of 04/28/23    MRI brain pituitary wo and w contrast    Impression  1. Stable 1.1 cm intrasellar/intrapituitary mass. Differential diagnosis remains Rathke's cleft cyst versus adenoma. Other mass lesions not excluded. No interval growth or mass effect on the optic chiasm. Continued clinical and imaging surveillance  recommended. 2. Scattered white matter lesions are nonspecific and could be related to precocious microangiopathy, especially if there are cerebrovascular risk factors. Other post infectious, post inflammatory or demyelinating processes including multiple sclerosis  or Lyme disease could be considered in the differential diagnosis. Patients with migraine headaches or vasculitis can also have similar white matter changes. Further clinical assessment advised. 3. No acute infarction. Workstation performed: [id number]         I have personally reviewed pertinent reports. Portions of the record may have been created with voice recognition software. Occasional wrong word or "sound a like" substitutions may have occurred due to the inherent limitations of voice recognition software. Read the chart carefully and recognize, using context, where substitutions have occurred.

## 2023-11-07 DIAGNOSIS — E11.00 TYPE 2 DIABETES MELLITUS WITH HYPEROSMOLARITY WITHOUT COMA, WITH LONG-TERM CURRENT USE OF INSULIN (HCC): ICD-10-CM

## 2023-11-07 DIAGNOSIS — Z79.4 TYPE 2 DIABETES MELLITUS WITH HYPEROSMOLARITY WITHOUT COMA, WITH LONG-TERM CURRENT USE OF INSULIN (HCC): ICD-10-CM

## 2023-11-07 RX ORDER — INSULIN GLARGINE-YFGN 100 [IU]/ML
20 INJECTION, SOLUTION SUBCUTANEOUS
Qty: 15 ML | Refills: 3 | Status: SHIPPED | OUTPATIENT
Start: 2023-11-07

## 2023-11-10 ENCOUNTER — TELEPHONE (OUTPATIENT)
Dept: ENDOCRINOLOGY | Facility: CLINIC | Age: 53
End: 2023-11-10

## 2023-11-28 LAB
CREAT ?TM UR-SCNC: 86 UMOL/L
EXT ALBUMIN URINE RANDOM: 1.4
HBA1C MFR BLD HPLC: 8.9 %
MICROALBUMIN/CREAT UR: 16 MG/G{CREAT}

## 2023-11-29 ENCOUNTER — TELEMEDICINE (OUTPATIENT)
Dept: ENDOCRINOLOGY | Facility: CLINIC | Age: 53
End: 2023-11-29
Payer: COMMERCIAL

## 2023-11-29 VITALS — BODY MASS INDEX: 19.63 KG/M2 | HEIGHT: 64 IN | WEIGHT: 115 LBS

## 2023-11-29 DIAGNOSIS — E11.42 TYPE 2 DIABETES MELLITUS WITH DIABETIC POLYNEUROPATHY, WITHOUT LONG-TERM CURRENT USE OF INSULIN (HCC): Primary | ICD-10-CM

## 2023-11-29 DIAGNOSIS — I10 PRIMARY HYPERTENSION: ICD-10-CM

## 2023-11-29 DIAGNOSIS — E78.2 MIXED HYPERLIPIDEMIA: ICD-10-CM

## 2023-11-29 DIAGNOSIS — D35.2 PITUITARY MACROADENOMA (HCC): ICD-10-CM

## 2023-11-29 PROCEDURE — 95251 CONT GLUC MNTR ANALYSIS I&R: CPT | Performed by: STUDENT IN AN ORGANIZED HEALTH CARE EDUCATION/TRAINING PROGRAM

## 2023-11-29 PROCEDURE — 99214 OFFICE O/P EST MOD 30 MIN: CPT | Performed by: STUDENT IN AN ORGANIZED HEALTH CARE EDUCATION/TRAINING PROGRAM

## 2023-11-29 RX ORDER — INSULIN HUMAN 100 [IU]/ML
INJECTION, SUSPENSION SUBCUTANEOUS
Qty: 54 ML | Refills: 0 | Status: SHIPPED | OUTPATIENT
Start: 2023-11-29

## 2023-11-29 RX ORDER — INSULIN HUMAN 100 [IU]/ML
INJECTION, SUSPENSION SUBCUTANEOUS
Qty: 54 ML | Refills: 0 | Status: SHIPPED | OUTPATIENT
Start: 2023-11-29 | End: 2023-11-29

## 2023-11-29 NOTE — PATIENT INSTRUCTIONS
Take 70/30, 30 units before breakfast and 20 units before dinner  Discontinue Semglee  Continue metformin and trulucity  We will follow your [facility] result

## 2023-11-29 NOTE — PROGRESS NOTES
Virtual Regular Visit    Verification of patient location:    Patient is located at Home in the following state in which I hold an active license [place]      Assessment/Plan:    Problem List Items Addressed This Visit          Endocrine    Type 2 diabetes mellitus with diabetic polyneuropathy, without long-term current use of insulin ([address]) - Primary    Relevant Medications    insulin isophane-insulin regular (HumuLIN 70/30 KwikPen) 100 units/mL injection pen    Other Relevant Orders    Hemoglobin A1C    Lipid Panel with Direct LDL reflex    Vitamin D 25 hydroxy    Albumin / creatinine urine ratio       Cardiovascular and Mediastinum    Hypertension       Other    Hyperlipidemia     Other Visit Diagnoses       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 She was advised to call the office to download her report,  Return back in 3 months. For pituitary macroadenoma, she has not done IGF-1 in [facility] which she was advised to follow, as she is currently on chemotherapy and high dose steroid for breast cancer, she would like to hold further management for pituitary macroadenoma for now,   If she has elevated IGF-1, I would not pursue for [address] suppression test with glucose for now. She is following with neurosurgery, and ophthalmology.   We will discuss more pending IGG-1 result,              Reason for visit is   Chief Complaint   Patient presents with    Virtual Regular Visit          Encounter provider Ty Mead MD    Provider located at [address]  [address] [place] [id number] [phone]      Recent Visits  No visits were found meeting these conditions. Showing recent visits within past 7 days and meeting all other requirements  Today's Visits  Date Type Provider Dept   11/29/23 Telemedicine Renaye Sacks, MD [facility] [place]   Showing today's visits and meeting all other requirements  Future Appointments  No visits were found meeting these conditions. Showing future appointments within next 150 days and meeting all other requirements       The patient was identified by name and date of birth. Reese Mclaughlin was informed that this is a telemedicine visit and that the visit is being conducted through the [employer]. She agrees to proceed. .  My office door was closed. No one else was in the room. She acknowledged consent and understanding of privacy and security of the video platform. The patient has agreed to participate and understands they can discontinue the visit at any time. Patient is aware this is a billable service. Subjective  Reese Mclaughlin is a 47 y.o. female who is seen virtually for type 2 diabetes and pituitary adenoma,       HPI     Reese Mclaughlin is a 47year old female who was seen virtually for type 2 diabetes, pituitary adenoma,    She recently was diagnosed with left breast cancer with resional recurrence, s/p bilateral mastectomy and currently on chemotherapy,  She is currently on dexamethasone for 4 days before and after chemotherapy, which caused hyperglycemia. She is on Metformin 1000 mg daily with dinner,  Semglee 30 units at night  Trulucity 3 mg weekly    Betty Cools Stacey   Device used,Kellen 2  Home use       Indication   Type 2 Diabetes      More than 72 hours of data was reviewed. Report to be scanned to chart.      Date Range: Nov 16 th- 29th    Analysis of data:   Average Glucose: 257 mg/dl  Coefficient of Variation: 26.7%   SD : x   Time in Target Range: 12%   Time Above Range: 88%   Time Below Range: 0         Past Medical History:   Diagnosis Date    Cancer ([address] [facility])     breast    Diabetes mellitus ([address] [facility])     Hypertension        Past Surgical History:   Procedure Laterality Date    BREAST LUMPECTOMY Left     DILATION AND CURETTAGE OF UTERUS      LYMPHADENECTOMY         Current Outpatient Medications   Medication Sig Dispense Refill    atorvastatin (LIPITOR) 40 mg tablet Take 1 tablet (40 mg total) by mouth daily 90 tablet 2    B-D ULTRAFINE III SHORT PEN 31G X 8 MM MISC       ferrous sulfate 325 (65 Fe) mg tablet Take 325 mg by mouth daily with breakfast      losartan (COZAAR) 50 mg tablet       metFORMIN (GLUCOPHAGE) 1000 MG tablet Take 1,000 mg by mouth daily with dinner      Semglee, yfgn, 100 UNIT/ML SOPN INJECT 20 UNITS UNDER THE SKIN DAILY AT BEDTIME 15 mL 3    acetaminophen (TYLENOL) 325 mg tablet Take 2 tablets (650 mg total) by mouth every 6 (six) hours as needed for mild pain or fever (Patient not taking: Reported on 1/27/2023) 30 tablet 0    aspirin 81 mg chewable tablet Chew 1 tablet (81 mg total) daily Do not start before December 11, 2022. (Patient not taking: Reported on 7/19/2023) 30 tablet 0     No current facility-administered medications for this visit. Allergies   Allergen Reactions    Dapagliflozin      Facial swelling       Review of Systems   Constitutional:  Positive for appetite change and fatigue. Negative for unexpected weight change. HENT:  Negative for trouble swallowing and voice change. Eyes:  Negative for visual disturbance. Respiratory:  Negative for cough, shortness of breath and wheezing. Cardiovascular:  Negative for palpitations and leg swelling. Gastrointestinal:  Positive for nausea. Negative for abdominal pain, constipation, diarrhea and vomiting. Endocrine: Negative for cold intolerance, heat intolerance, polyphagia and polyuria. Musculoskeletal:  Negative for arthralgias. Skin:  Negative for color change, rash and wound.    Neurological:  Negative for dizziness, tremors, weakness, light-headedness, numbness and headaches. Psychiatric/Behavioral:  Negative for agitation and sleep disturbance. The patient is not nervous/anxious. Video Exam    Vitals:    11/29/23 1116   Weight: 52.2 kg (115 lb)   Height: 5' 3.5" (1.613 m)       Physical Exam  Constitutional:       General: She is not in acute distress. Comments: Hair loss,    Pulmonary:      Effort: Pulmonary effort is normal. No respiratory distress. Neurological:      Mental Status: She is alert and oriented to person, place, and time.           Visit Time  Total Visit Duration: 30

## 2023-12-04 DIAGNOSIS — D35.2 PITUITARY MACROADENOMA (HCC): Primary | ICD-10-CM

## 2023-12-06 ENCOUNTER — TELEPHONE (OUTPATIENT)
Dept: ENDOCRINOLOGY | Facility: CLINIC | Age: 53
End: 2023-12-06

## 2023-12-08 ENCOUNTER — TELEPHONE (OUTPATIENT)
Dept: ENDOCRINOLOGY | Facility: CLINIC | Age: 53
End: 2023-12-08

## 2023-12-11 ENCOUNTER — TELEPHONE (OUTPATIENT)
Dept: ENDOCRINOLOGY | Facility: CLINIC | Age: 53
End: 2023-12-11

## 2023-12-22 NOTE — PROGRESS NOTES
Kellen order in process as per parachute. Neurology        S: patient seen. no neuro changes mehul gup eating.  plan for MRI      Medications: MEDICATIONS  (STANDING):  clonazePAM  Tablet 0.5 milliGRAM(s) Oral two times a day  clonazePAM  Tablet 0.5 milliGRAM(s) Oral once  FLUoxetine 20 milliGRAM(s) Oral daily  furosemide    Tablet 20 milliGRAM(s) Oral daily  losartan 100 milliGRAM(s) Oral daily  meclizine 12.5 milliGRAM(s) Oral two times a day  metoprolol succinate  milliGRAM(s) Oral daily  NIFEdipine XL 60 milliGRAM(s) Oral daily    MEDICATIONS  (PRN):       Vitals:  Vital Signs Last 24 Hrs  T(C): 36.6 (21 Dec 2023 16:47), Max: 37.1 (21 Dec 2023 11:17)  T(F): 97.8 (21 Dec 2023 16:47), Max: 98.7 (21 Dec 2023 11:17)  HR: 84 (21 Dec 2023 16:47) (84 - 89)  BP: 117/82 (21 Dec 2023 16:47) (117/71 - 136/76)  BP(mean): --  RR: 18 (21 Dec 2023 16:47) (18 - 18)  SpO2: 99% (21 Dec 2023 16:47) (93% - 99%)    Parameters below as of 21 Dec 2023 16:47  Patient On (Oxygen Delivery Method): room air             General Exam:   General Appearance: Appropriately dressed and in no acute distress       Head: Normocephalic, atraumatic and no dysmorphic features  Ear, Nose, and Throat: Moist mucous membranes  CVS: S1S2+  Resp: No SOB, no wheeze or rhonchi  GI: soft NT/ND  Extremities: No edema or cyanosis  Skin: No bruises or rashes     Neurological Exam:  Mental Status: Awake, alert and oriented x 1-2  Able to follow simple  verbal commands. Able to name and repeat. fluent speech. No obvious aphasia mild dysarthria noted.   Cranial Nerves: PERRL, EOMI, VFFC, sensation V1-V3 intact,  no obvious facial asymmetry, equal elevation of palate, scm/trap 5/5, tongue is midline on protrusion. no obvious papilledema on fundoscopic exam. hearing is grossly intact.   Motor: CHAVEZ uppers 4/5 > lowers 3/5   Sensation: Intact to light touch and pinprick throughout. no right/left confusion. no extinction to tactile on DSS.   Coordination: No dysmetria on FNF   Gait: deferred     Data/Labs/Imaging which I personally reviewed.     Labs:       LABS:                          13.9   5.76  )-----------( 200      ( 21 Dec 2023 06:51 )             42.0     12-21    137  |  102  |  12  ----------------------------<  104<H>  4.1   |  27  |  0.89    Ca    9.1      21 Dec 2023 06:52          Urinalysis Basic - ( 21 Dec 2023 06:52 )    Color: x / Appearance: x / SG: x / pH: x  Gluc: 104 mg/dL / Ketone: x  / Bili: x / Urobili: x   Blood: x / Protein: x / Nitrite: x   Leuk Esterase: x / RBC: x / WBC x   Sq Epi: x / Non Sq Epi: x / Bacteria: x          < from: CT Head No Cont (12.20.23 @ 09:22) >    ACC: [id number] EXAM:  CT BRAIN   ORDERED BY:  LARRY BROCK     PROCEDURE DATE:  12/20/2023          INTERPRETATION:  Noncontrast CT of the brain.    CLINICAL INDICATION:  Dizziness    TECHNIQUE : Axial CT scanning of the brain was obtained from the skull   base to the vertex without the administration of intravenous contrast.   Sagittal and coronal reformats were provided.    COMPARISON: CT brain 10/12/2023    FINDINGS:    Subtle nonspecific low density in the left inferior cerebellar hemisphere.    No acute intracranial hemorrhage.    Similar mild physiologic leftward midline shift. No effacement of basal   cisterns. No hydrocephalus.    Mild white matter microvascular ischemic disease.    The visualized paranasal sinuses and mastoid air cells are clear.    IMPRESSION:    Subtle nonspecific low density in the left inferior cerebellar hemisphere   could represent vasogenic edema or chronic ischemic changes.    Correlation with contrast-enhanced MRI of the brain is recommended for   further evaluation.      --- End of Report ---            KALEB URBINA MD; Attending Radiologist  This document has been electronically signed. Dec 20 2023  9:44AM    < end of copied text >       Neurology        S: patient seen. no neuro changes mehul gup eating.  plan for MRI      Medications: MEDICATIONS  (STANDING):  clonazePAM  Tablet 0.5 milliGRAM(s) Oral two times a day  clonazePAM  Tablet 0.5 milliGRAM(s) Oral once  FLUoxetine 20 milliGRAM(s) Oral daily  furosemide    Tablet 20 milliGRAM(s) Oral daily  losartan 100 milliGRAM(s) Oral daily  meclizine 12.5 milliGRAM(s) Oral two times a day  metoprolol succinate  milliGRAM(s) Oral daily  NIFEdipine XL 60 milliGRAM(s) Oral daily    MEDICATIONS  (PRN):       Vitals:  Vital Signs Last 24 Hrs  T(C): 36.6 (21 Dec 2023 16:47), Max: 37.1 (21 Dec 2023 11:17)  T(F): 97.8 (21 Dec 2023 16:47), Max: 98.7 (21 Dec 2023 11:17)  HR: 84 (21 Dec 2023 16:47) (84 - 89)  BP: 117/82 (21 Dec 2023 16:47) (117/71 - 136/76)  BP(mean): --  RR: 18 (21 Dec 2023 16:47) (18 - 18)  SpO2: 99% (21 Dec 2023 16:47) (93% - 99%)    Parameters below as of 21 Dec 2023 16:47  Patient On (Oxygen Delivery Method): room air             General Exam:   General Appearance: Appropriately dressed and in no acute distress       Head: Normocephalic, atraumatic and no dysmorphic features  Ear, Nose, and Throat: Moist mucous membranes  CVS: S1S2+  Resp: No SOB, no wheeze or rhonchi  GI: soft NT/ND  Extremities: No edema or cyanosis  Skin: No bruises or rashes     Neurological Exam:  Mental Status: Awake, alert and oriented x 1-2  Able to follow simple  verbal commands. Able to name and repeat. fluent speech. No obvious aphasia mild dysarthria noted.   Cranial Nerves: PERRL, EOMI, VFFC, sensation V1-V3 intact,  no obvious facial asymmetry, equal elevation of palate, scm/trap 5/5, tongue is midline on protrusion. no obvious papilledema on fundoscopic exam. hearing is grossly intact.   Motor: CHAVEZ uppers 4/5 > lowers 3/5   Sensation: Intact to light touch and pinprick throughout. no right/left confusion. no extinction to tactile on DSS.   Coordination: No dysmetria on FNF   Gait: deferred     Data/Labs/Imaging which I personally reviewed.     Labs:       LABS:                          13.9   5.76  )-----------( 200      ( 21 Dec 2023 06:51 )             42.0     12-21    137  |  102  |  12  ----------------------------<  104<H>  4.1   |  27  |  0.89    Ca    9.1      21 Dec 2023 06:52          Urinalysis Basic - ( 21 Dec 2023 06:52 )    Color: x / Appearance: x / SG: x / pH: x  Gluc: 104 mg/dL / Ketone: x  / Bili: x / Urobili: x   Blood: x / Protein: x / Nitrite: x   Leuk Esterase: x / RBC: x / WBC x   Sq Epi: x / Non Sq Epi: x / Bacteria: x          < from: CT Head No Cont (12.20.23 @ 09:22) >    ACC: [id number] EXAM:  CT BRAIN   ORDERED BY:  LARRY BROCK     PROCEDURE DATE:  12/20/2023          INTERPRETATION:  Noncontrast CT of the brain.    CLINICAL INDICATION:  Dizziness    TECHNIQUE : Axial CT scanning of the brain was obtained from the skull   base to the vertex without the administration of intravenous contrast.   Sagittal and coronal reformats were provided.    COMPARISON: CT brain 10/12/2023    FINDINGS:    Subtle nonspecific low density in the left inferior cerebellar hemisphere.    No acute intracranial hemorrhage.    Similar mild physiologic leftward midline shift. No effacement of basal   cisterns. No hydrocephalus.    Mild white matter microvascular ischemic disease.    The visualized paranasal sinuses and mastoid air cells are clear.    IMPRESSION:    Subtle nonspecific low density in the left inferior cerebellar hemisphere   could represent vasogenic edema or chronic ischemic changes.    Correlation with contrast-enhanced MRI of the brain is recommended for   further evaluation.      --- End of Report ---            KALEB URBINA MD; Attending Radiologist  This document has been electronically signed. Dec 20 2023  9:44AM    < end of copied text >